# Patient Record
Sex: FEMALE | Race: WHITE | Employment: PART TIME | URBAN - METROPOLITAN AREA
[De-identification: names, ages, dates, MRNs, and addresses within clinical notes are randomized per-mention and may not be internally consistent; named-entity substitution may affect disease eponyms.]

---

## 2017-01-31 ENCOUNTER — OFFICE VISIT (OUTPATIENT)
Dept: FAMILY MEDICINE CLINIC | Age: 67
End: 2017-01-31

## 2017-01-31 VITALS
BODY MASS INDEX: 24.8 KG/M2 | DIASTOLIC BLOOD PRESSURE: 90 MMHG | WEIGHT: 140 LBS | SYSTOLIC BLOOD PRESSURE: 140 MMHG | HEART RATE: 87 BPM | TEMPERATURE: 97.5 F | RESPIRATION RATE: 16 BRPM | HEIGHT: 63 IN | OXYGEN SATURATION: 97 %

## 2017-01-31 DIAGNOSIS — L65.9 HAIR LOSS: ICD-10-CM

## 2017-01-31 DIAGNOSIS — I10 ESSENTIAL HYPERTENSION: Primary | ICD-10-CM

## 2017-01-31 DIAGNOSIS — M25.572 ACUTE LEFT ANKLE PAIN: ICD-10-CM

## 2017-01-31 RX ORDER — AMLODIPINE BESYLATE 10 MG/1
10 TABLET ORAL
Qty: 30 TAB | Refills: 5 | Status: SHIPPED | OUTPATIENT
Start: 2017-01-31 | End: 2017-01-31 | Stop reason: SDUPTHER

## 2017-01-31 RX ORDER — LANOLIN ALCOHOL/MO/W.PET/CERES
325 CREAM (GRAM) TOPICAL DAILY
Qty: 30 TAB | Refills: 5 | Status: SHIPPED | OUTPATIENT
Start: 2017-01-31 | End: 2017-01-31 | Stop reason: SDUPTHER

## 2017-01-31 RX ORDER — AMLODIPINE BESYLATE 10 MG/1
10 TABLET ORAL
Qty: 30 TAB | Refills: 5 | Status: SHIPPED | OUTPATIENT
Start: 2017-01-31 | End: 2017-04-07 | Stop reason: SDUPTHER

## 2017-01-31 RX ORDER — LANOLIN ALCOHOL/MO/W.PET/CERES
325 CREAM (GRAM) TOPICAL DAILY
Qty: 30 TAB | Refills: 5 | Status: SHIPPED | OUTPATIENT
Start: 2017-01-31 | End: 2017-08-26

## 2017-01-31 RX ORDER — LISINOPRIL 40 MG/1
40 TABLET ORAL DAILY
Qty: 30 TAB | Refills: 5 | Status: SHIPPED | OUTPATIENT
Start: 2017-01-31 | End: 2017-01-31 | Stop reason: SDUPTHER

## 2017-01-31 RX ORDER — ALBUTEROL SULFATE 90 UG/1
2 AEROSOL, METERED RESPIRATORY (INHALATION)
Qty: 1 INHALER | Refills: 5 | Status: SHIPPED | OUTPATIENT
Start: 2017-01-31 | End: 2017-08-14 | Stop reason: SDUPTHER

## 2017-01-31 RX ORDER — OXYCODONE AND ACETAMINOPHEN 5; 325 MG/1; MG/1
1 TABLET ORAL
Qty: 30 TAB | Refills: 0 | Status: SHIPPED | OUTPATIENT
Start: 2017-01-31 | End: 2017-04-07 | Stop reason: SDUPTHER

## 2017-01-31 RX ORDER — LISINOPRIL 40 MG/1
40 TABLET ORAL DAILY
Qty: 30 TAB | Refills: 5 | Status: SHIPPED | OUTPATIENT
Start: 2017-01-31 | End: 2017-04-07 | Stop reason: ALTCHOICE

## 2017-01-31 RX ORDER — ALBUTEROL SULFATE 90 UG/1
2 AEROSOL, METERED RESPIRATORY (INHALATION)
Qty: 1 INHALER | Refills: 5 | Status: SHIPPED | OUTPATIENT
Start: 2017-01-31 | End: 2017-01-31 | Stop reason: SDUPTHER

## 2017-01-31 NOTE — PROGRESS NOTES
Chief Complaint   Patient presents with    Hypertension     1. Have you been to the ER, urgent care clinic since your last visit? Hospitalized since your last visit? No    2. Have you seen or consulted any other health care providers outside of the [address] [place] [address] since your last visit? Include any pap smears or colon screening.  No

## 2017-01-31 NOTE — MR AVS SNAPSHOT
Visit Information Date & Time Provider Department Dept. Phone Encounter #  
 1/31/2017 10:20 AM Norman Vargas [phone] [id number] Follow-up Instructions Return in about 6 months (around 7/31/2017). Upcoming Health Maintenance Date Due Hepatitis C Screening 1950 FOOT EXAM Q1 12/15/1960 MICROALBUMIN Q1 12/15/1960 EYE EXAM RETINAL OR DILATED Q1 12/15/1960 DTaP/Tdap/Td series (1 - Tdap) 12/15/1971 BREAST CANCER SCRN MAMMOGRAM 12/15/2000 ZOSTER VACCINE AGE 60> 12/15/2010 GLAUCOMA SCREENING Q2Y 12/15/2015 OSTEOPOROSIS SCREENING (DEXA) 12/15/2015 Pneumococcal 65+ Low/Medium Risk (1 of 2 - PCV13) 12/15/2015 MEDICARE YEARLY EXAM 12/15/2015 INFLUENZA AGE 9 TO ADULT 8/1/2016 LIPID PANEL Q1 8/28/2016 HEMOGLOBIN A1C Q6M 4/23/2017 COLONOSCOPY 8/3/2025 Allergies as of 1/31/2017  Review Complete On: 1/31/2017 By: Jerome Payne LPN No Known Allergies Current Immunizations  Never Reviewed No immunizations on file. Not reviewed this visit You Were Diagnosed With   
  
 Codes Comments Essential hypertension    -  Primary ICD-10-CM: I10 
ICD-9-CM: 401.9 Acute left ankle pain     ICD-10-CM: M25.572 ICD-9-CM: 719.47 Vitals BP Pulse Temp Resp Height(growth percentile) Weight(growth percentile) 140/90 (BP 1 Location: Right arm, BP Patient Position: Sitting) 87 97.5 °F (36.4 °C) 16 5' 3\" (1.6 m) 140 lb (63.5 kg) SpO2 BMI OB Status Smoking Status 97% 24.8 kg/m2 Hysterectomy Former Smoker BMI and BSA Data Body Mass Index Body Surface Area  
 24.8 kg/m 2 1.68 m 2 Preferred Pharmacy Pharmacy Name Phone [address] [phone] Your Updated Medication List  
  
   
 This list is accurate as of: 1/31/17 11:03 AM.  Always use your most recent med list.  
  
  
  
  
 albuterol 90 mcg/actuation inhaler Commonly known as:  PROVENTIL HFA, VENTOLIN HFA, PROAIR HFA Take 2 Puffs by inhalation every four (4) hours as needed for Wheezing. amLODIPine 10 mg tablet Commonly known as:  Lindsey John Take 1 Tab by mouth nightly. Indications: Hypertension  
  
 ferrous sulfate 325 mg (65 mg iron) tablet Take 1 Tab by mouth daily. lisinopril 40 mg tablet Commonly known as:  Norma Salcha Take 1 Tab by mouth daily. oxyCODONE-acetaminophen 5-325 mg per tablet Commonly known as:  PERCOCET Take 1 Tab by mouth every eight (8) hours as needed. Max Daily Amount: 3 Tabs. Indications: PAIN Prescriptions Printed Refills  
 oxyCODONE-acetaminophen (PERCOCET) 5-325 mg per tablet 0 Sig: Take 1 Tab by mouth every eight (8) hours as needed. Max Daily Amount: 3 Tabs. Indications: PAIN Class: Print Route: Oral  
  
Prescriptions Sent to Pharmacy Refills  
 lisinopril (PRINIVIL, ZESTRIL) 40 mg tablet 5 Sig: Take 1 Tab by mouth daily. Class: Normal  
 Pharmacy: [facility] [address] [facility] [place] Ph #: [phone] Route: Oral  
 amLODIPine (NORVASC) 10 mg tablet 5 Sig: Take 1 Tab by mouth nightly. Indications: Hypertension Class: Normal  
 Pharmacy: [facility] [address] [facility] [place] Ph #: [phone] Route: Oral  
 albuterol (PROVENTIL HFA, VENTOLIN HFA, PROAIR HFA) 90 mcg/actuation inhaler 5 Sig: Take 2 Puffs by inhalation every four (4) hours as needed for Wheezing. Class: Normal  
 Pharmacy: [facility] [address] [facility] [place] Ph #: [phone] Route: Inhalation ferrous sulfate 325 mg (65 mg iron) tablet 5 Sig: Take 1 Tab by mouth daily. Class: Normal  
 Pharmacy: [facility] [address] [facility] [place]  #: [phone] Route: Oral  
  
Follow-up Instructions Return in about 6 months (around 7/31/2017). Introducing [facility]! Ora Telles introduces [employer] patient portal. Now you can access parts of your medical record, email your doctor's office, and request medication refills online. 1. In your internet browser, go to [email or web address]. [email or web address] 2. Click on the First Time User? Click Here link in the Sign In box. You will see the New Member Sign Up page. 3. Enter your Vionic Access Code exactly as it appears below. You will not need to use this code after youve completed the sign-up process. If you do not sign up before the expiration date, you must request a new code. · Vionic Access Code: [email or web address] Expires: 4/30/2017 12:49 PM 
 
4. Enter the last four digits of your Social Security Number (xxxx) and Date of Birth (mm/dd/yyyy) as indicated and click Submit. You will be taken to the next sign-up page. 5. Create a Vionic ID. This will be your Vionic login ID and cannot be changed, so think of one that is secure and easy to remember. 6. Create a Vionic password. You can change your password at any time. 7. Enter your Password Reset Question and Answer. This can be used at a later time if you forget your password. 8. Enter your e-mail address. You will receive e-mail notification when new information is available in [address] 9. Click Sign Up. You can now view and download portions of your medical record. 10. Click the Download Summary menu link to download a portable copy of your medical information.  
 
If you have questions, please visit the Frequently Asked Questions section of the [employer]. Remember, [employer] is NOT to be used for urgent needs. For medical emergencies, dial 911. Now available from your iPhone and Android! Please provide this summary of care documentation to your next provider. Your primary care clinician is listed as Fay Ashton. If you have any questions after today's visit, please call [phone].

## 2017-01-31 NOTE — PROGRESS NOTES
Marco Long  77 y.o. female  1950  [address] [facility] [address]  [address] [place] [address] Dr: Progress Note  Ondina Solis MD       Encounter Date: 1/31/2017    Chief Complaint   Patient presents with    Hypertension     140/90 today    Ankle Pain     X 7days, swollen     History of Present Illness   Marco Long is a 77 y.o. female who presents to clinic today for follow-up on blood pressure. Has been taking both medications without significant elevation. Denies dizziness, lightheadednss, chest pain, palpitations, headaches, changes in vision. Did restart work and worked 4 hours. Did okay. Has however had 7 days of left ankle pain and swelling. Worse with touching and movement. Has worn neoprene sleve without much benefit. No known trauma or injury. Review of Systems   Review of Systems   Constitutional: Negative for chills and fever. HENT: Negative for congestion and sore throat. Eyes: Negative for blurred vision and double vision. Respiratory: Negative for cough, shortness of breath and wheezing. Cardiovascular: Negative for chest pain and palpitations. Gastrointestinal: Negative for abdominal pain, constipation, diarrhea and nausea. Genitourinary: Negative for dysuria and hematuria. Musculoskeletal: Positive for joint pain. Negative for falls and myalgias. Skin: Negative for rash. Neurological: Negative for headaches. All other systems reviewed and are negative. Vitals/Objective:     Vitals:    01/31/17 1038   BP: 140/90   Pulse: 87   Resp: 16   Temp: 97.5 °F (36.4 °C)   SpO2: 97%   Weight: 140 lb (63.5 kg)   Height: 5' 3\" (1.6 m)     Body mass index is 24.8 kg/(m^2). Physical Exam   Constitutional: She appears well-nourished. No distress. Eyes: Conjunctivae and EOM are normal. No scleral icterus. Cardiovascular: Normal rate, regular rhythm, normal heart sounds and intact distal pulses.   Exam reveals no gallop and no friction rub. No murmur heard. Pulmonary/Chest: Effort normal and breath sounds normal. No respiratory distress. She has no wheezes. Abdominal: Bowel sounds are normal. She exhibits no distension. There is no tenderness. Musculoskeletal:        Left ankle: She exhibits swelling. She exhibits no deformity. Tenderness. Medial malleolus tenderness found. No lateral malleolus, no AITFL, no CF ligament, no posterior TFL, no head of 5th metatarsal and no proximal fibula tenderness found. Achilles tendon exhibits no pain, no defect and normal Barros's test results. Feet:    Neurological: She displays normal reflexes. Gait (Antalgic gait) abnormal. Coordination normal.     Assessment and Plan:   1. Essential hypertension  Continue current dose. Sent to new pharmacy given change in insurance. - lisinopril (PRINIVIL, ZESTRIL) 40 mg tablet; Take 1 Tab by mouth daily. Dispense: 30 Tab; Refill: 5  - amLODIPine (NORVASC) 10 mg tablet; Take 1 Tab by mouth nightly. Indications: Hypertension  Dispense: 30 Tab; Refill: 5    2. Acute left ankle pain  Ice, elevate, compression, NSAIDS. Possible sprain, though etiology unclear. If no improvement in 1 week, will x-ray. I have discussed the diagnosis with the patient and the intended plan as seen in the above orders. she has expressed understanding. The patient has received an after-visit summary and questions were answered concerning future plans. I have discussed medication side effects and warnings with the patient as well. Follow-up Disposition:  Return in about 6 months (around 7/31/2017). Electronically Signed: Terrance Gutierrez MD     History   Patients past medical, surgical and family histories were reviewed and updated.     Past Medical History   Diagnosis Date    Hard of hearing     Hypertension     Iron deficiency anemia      baseline 9.7 on 8/2015    PUD (peptic ulcer disease)      hospitalized at [facility], EGD showed a nonbleeding esophageal ulcer, multiple gastric ulcers    Pulmonary nodule 8/2/2015     CT chest: 2.8 cm focal airspace disease RUL, pna vs mass, referred to pulm with PET     Past Surgical History   Procedure Laterality Date    Hx partial hysterectomy Bilateral     Hx malignant skin lesion excision Right under  eye     Family History   Problem Relation Age of Onset    Diabetes Mother     Hypertension Mother     Alzheimer Mother     Pacemaker Mother     Heart Attack Father      Social History     Social History    Marital status:      Spouse name: N/A    Number of children: N/A    Years of education: N/A     Occupational History    Not on file. Social History Main Topics    Smoking status: Former Smoker     Packs/day: 0.25     Years: 30.00     Quit date: 10/16/2016    Smokeless tobacco: Never Used    Alcohol use 0.0 oz/week     0 Standard drinks or equivalent per week      Comment: very rare    Drug use: No    Sexual activity: Not on file     Other Topics Concern    Not on file     Social History Narrative            Current Medications/Allergies     Current Outpatient Prescriptions   Medication Sig Dispense Refill    lisinopril (PRINIVIL, ZESTRIL) 40 mg tablet Take 1 Tab by mouth daily. 30 Tab 5    amLODIPine (NORVASC) 10 mg tablet Take 1 Tab by mouth nightly. Indications: Hypertension 30 Tab 5    oxyCODONE-acetaminophen (PERCOCET) 5-325 mg per tablet Take 1 Tab by mouth every eight (8) hours as needed. Max Daily Amount: 3 Tabs. Indications: PAIN 30 Tab 0    albuterol (PROVENTIL HFA, VENTOLIN HFA, PROAIR HFA) 90 mcg/actuation inhaler Take 2 Puffs by inhalation every four (4) hours as needed for Wheezing. 1 Inhaler 5    ferrous sulfate 325 mg (65 mg iron) tablet Take 1 Tab by mouth daily.  30 Tab 5     No Known Allergies

## 2017-04-07 ENCOUNTER — OFFICE VISIT (OUTPATIENT)
Dept: FAMILY MEDICINE CLINIC | Age: 67
End: 2017-04-07

## 2017-04-07 VITALS
SYSTOLIC BLOOD PRESSURE: 248 MMHG | RESPIRATION RATE: 16 BRPM | HEART RATE: 91 BPM | TEMPERATURE: 99.2 F | BODY MASS INDEX: 25.87 KG/M2 | WEIGHT: 146 LBS | DIASTOLIC BLOOD PRESSURE: 130 MMHG | OXYGEN SATURATION: 95 % | HEIGHT: 63 IN

## 2017-04-07 DIAGNOSIS — M46.1 SACROILIITIS (HCC): ICD-10-CM

## 2017-04-07 DIAGNOSIS — I10 ACCELERATED HYPERTENSION: Primary | ICD-10-CM

## 2017-04-07 DIAGNOSIS — R10.33 PERIUMBILICAL ABDOMINAL PAIN: ICD-10-CM

## 2017-04-07 DIAGNOSIS — I10 ESSENTIAL HYPERTENSION: ICD-10-CM

## 2017-04-07 RX ORDER — AMLODIPINE BESYLATE 10 MG/1
10 TABLET ORAL
Qty: 30 TAB | Refills: 5 | Status: SHIPPED | OUTPATIENT
Start: 2017-04-07 | End: 2017-09-14 | Stop reason: SDUPTHER

## 2017-04-07 RX ORDER — LOSARTAN POTASSIUM 100 MG/1
100 TABLET ORAL DAILY
Qty: 30 TAB | Refills: 5 | Status: SHIPPED | OUTPATIENT
Start: 2017-04-07 | End: 2017-09-14 | Stop reason: SDUPTHER

## 2017-04-07 RX ORDER — OXYCODONE AND ACETAMINOPHEN 5; 325 MG/1; MG/1
1 TABLET ORAL
Qty: 30 TAB | Refills: 0 | Status: SHIPPED | OUTPATIENT
Start: 2017-04-07 | End: 2017-04-14 | Stop reason: ALTCHOICE

## 2017-04-07 NOTE — PATIENT INSTRUCTIONS
Sacroiliac Joint Pain: Care Instructions  Your Care Instructions    The sacroiliac joints connect the spine and each side of the pelvis. These joints bear the weight and stress of your torso. This makes them easy to injure. Injury or overuse of these joints may cause low back pain. Stress on these joints can cause joint pain. Sacroiliac joint pain is more common in pregnant women. Certain kinds of arthritis also may cause this type of joint pain. Home treatment may help you feel better. So can avoiding activities that stress your back. Your doctor also may recommend physical therapy. This may include doing exercises and stretches to help with pain. You may also learn to use good posture. Follow-up care is a key part of your treatment and safety. Be sure to make and go to all appointments, and call your doctor if you are having problems. It's also a good idea to know your test results and keep a list of the medicines you take. How can you care for yourself at home? · Ask your doctor about light exercises that may help your back pain. Try to do light activity throughout the day. But make sure to take rests as needed. Find a comfortable position for rest, but don't stay in one position for too long. Avoid activities that cause pain. · To apply heat, put a warm water bottle, a heating pad set on low, or a warm cloth on your back. Do not go to sleep with a heating pad on your skin. · Put ice or a cold pack on your back for 10 to 20 minutes at a time. Put a thin cloth between the ice and your skin. · If the doctor gave you a prescription medicine for pain, take it as prescribed. · If you are not taking a prescription pain medicine, ask your doctor if you can take an over-the-counter pain medicine, such as acetaminophen (Tylenol), ibuprofen (Advil, Motrin), or naproxen (Aleve). Read and follow all instructions on the label. Take pain medicines exactly as directed.   · Do not take two or more pain medicines at the same time unless the doctor told you to. Many pain medicines have acetaminophen, which is Tylenol. Too much acetaminophen (Tylenol) can be harmful. · To prevent future back pain, do exercises to stretch and strengthen your back and stomach. Learn how to use good posture, safe lifting techniques, and proper body mechanics. When should you call for help? Call 911 anytime you think you may need emergency care. For example, call if:  · You are unable to move a leg at all. Call your doctor now or seek immediate medical care if:  · You have new or worse symptoms in your legs or buttocks. Symptoms may include:  ¨ Numbness or tingling. ¨ Weakness. ¨ Pain. · You lose bladder or bowel control. Watch closely for changes in your health, and be sure to contact your doctor if:  · You are not getting better as expected. Where can you learn more? Go to http://katerinPearlChain.netcelina.info/. Enter O658 in the search box to learn more about \"Sacroiliac Joint Pain: Care Instructions. \"  Current as of: May 23, 2016  Content Version: 11.2  © 6050-0824 [employer]. Care instructions adapted under license by [employer] (which disclaims liability or warranty for this information). If you have questions about a medical condition or this instruction, always ask your healthcare professional. [address] any warranty or liability for your use of this information. Sacroiliac Pain: Exercises  Your Care Instructions  Here are some examples of typical rehabilitation exercises for your condition. Start each exercise slowly. Ease off the exercise if you start to have pain. Your doctor or physical therapist will tell you when you can start these exercises and which ones will work best for you. How to do the exercises  Knee-to-chest stretch    Do not do the knee-to-chest exercise if it causes or increases back or leg pain.   1. Lie on your back with your knees bent and your feet flat on the floor. You can put a small pillow under your head and neck if it is more comfortable. 2. Grasp your hands under one knee and bring the knee to your chest, keeping the other foot flat on the floor. 3. Keep your lower back pressed to the floor. Hold for at least 15 to 30 seconds. 4. Relax and lower the knee to the starting position. Repeat with the other leg. 5. Repeat 2 to 4 times with each leg. 6. To get more stretch, keep your other leg flat on the floor while pulling your knee to your chest.  Bridging    1. Lie on your back with both knees bent. Your knees should be bent about 90 degrees. 2. Tighten your belly muscles by pulling in your belly button toward your spine. Then push your feet into the floor, squeeze your buttocks, and lift your hips off the floor until your shoulders, hips, and knees are all in a straight line. 3. Hold for about 6 seconds as you continue to breathe normally, and then slowly lower your hips back down to the floor and rest for up to 10 seconds. 4. Repeat 8 to 12 times. Hip extension    1. Get down on your hands and knees on the floor. 2. Keeping your back and neck straight, lift one leg straight out behind you. When you lift your leg, keep your hips level. Don't let your back twist, and don't let your hip drop toward the floor. 3. Hold for 6 seconds. Repeat 8 to 12 times with each leg. 4. If you feel steady and strong when you do this exercise, you can make it more difficult. To do this, when you lift your leg, also lift the opposite arm straight out in front of you. For example, lift the left leg and the right arm at the same time. (This is sometimes called the \"bird dog exercise. \") Hold for 6 seconds, and repeat 8 to 12 times on each side. Clamshell    1. Lie on your side with a pillow under your head. Keep your feet and knees together and your knees bent. 2. Raise your top knee, but keep your feet together. Do not let your hips roll back.  Your legs should open up like a clamshell. 3. Hold for 6 seconds. 4. Slowly lower your knee back down. Rest for 10 seconds. 5. Repeat 8 to 12 times. 6. Switch to your other side and repeat steps 1 through 5. Hamstring wall stretch    1. Lie on your back in a doorway, with one leg through the open door. 2. Slide your affected leg up the wall to straighten your knee. You should feel a gentle stretch down the back of your leg. ¨ Do not arch your back. ¨ Do not bend either knee. ¨ Keep one heel touching the floor and the other heel touching the wall. Do not point your toes. 3. Hold the stretch for at least 1 minute to begin. Then try to lengthen the time you hold the stretch to as long as 6 minutes. 4. Switch legs, and repeat steps 1 through 3.  5. Repeat 2 to 4 times. If you do not have a place to do this exercise in a doorway, there is another way to do it:  1. Lie on your back, and bend one knee. 2. Loop a towel under the ball and toes of that foot, and hold the ends of the towel in your hands. 3. Straighten your knee, and slowly pull back on the towel. You should feel a gentle stretch down the back of your leg. 4. Switch legs, and repeat steps 1 through 3.  5. Repeat 2 to 4 times. Lower abdominal strengthening    1. Lie on your back with your knees bent and your feet flat on the floor. 2. Tighten your belly muscles by pulling your belly button in toward your spine. 3. Lift one foot off the floor and bring your knee toward your chest, so that your knee is straight above your hip and your leg is bent like the letter \"L. \"  4. Lift the other knee up to the same position. 5. Lower one leg at a time to the starting position. 6. Keep alternating legs until you have lifted each leg 8 to 12 times. 7. Be sure to keep your belly muscles tight and your back still as you are moving your legs. Be sure to breathe normally. Piriformis stretch    1. Lie on your back with your legs straight.   2. Lift your affected leg, and bend your knee. With your opposite hand, reach across your body, and then gently pull your knee toward your opposite shoulder. 3. Hold the stretch for 15 to 30 seconds. 4. Switch legs and repeat steps 1 through 3.  5. Repeat 2 to 4 times. Follow-up care is a key part of your treatment and safety. Be sure to make and go to all appointments, and call your doctor if you are having problems. It's also a good idea to know your test results and keep a list of the medicines you take. Where can you learn more? Go to http://katerin-celina.info/. Enter C592 in the search box to learn more about \"Sacroiliac Pain: Exercises. \"  Current as of: May 23, 2016  Content Version: 11.2  © 3304-8862 [employer]. Care instructions adapted under license by [employer] (which disclaims liability or warranty for this information). If you have questions about a medical condition or this instruction, always ask your healthcare professional. [place] [address] any warranty or liability for your use of this information. Acute High Blood Pressure: Care Instructions  Your Care Instructions  Acute high blood pressure is very high blood pressure. It's a serious problem. Very high blood pressure can damage your brain, heart, eyes, and kidneys. You may have been given medicines to lower your blood pressure. You may have gotten them as pills or through a needle in one of your veins. This is called an IV. And maybe you were given other medicines too. These can be needed when high blood pressure causes other problems. To keep your blood pressure at a lower level, you may need to make healthy lifestyle changes. And you will probably need to take medicines. Be sure to follow up with your doctor about your blood pressure and what you can do about it. Follow-up care is a key part of your treatment and safety.  Be sure to make and go to all appointments, and call your doctor if you are having problems. It's also a good idea to know your test results and keep a list of the medicines you take. How can you care for yourself at home? · See your doctor as often as he or she recommends. This is to make sure your blood pressure is under control. You will probably go at least 2 times a year. But it may be more often at first.  · Take your blood pressure medicine exactly as prescribed. You may take one or more types. They include diuretics, beta-blockers, ACE inhibitors, calcium channel blockers, and angiotensin II receptor blockers. Call your doctor if you think you are having a problem with your medicine. · If you take blood pressure medicine, talk to your doctor before you take decongestants or anti-inflammatory medicine, such as ibuprofen. These can raise blood pressure. · Learn how to check your blood pressure at home. Check it often. · Ask your doctor if it's okay to drink alcohol. · Talk to your doctor about lifestyle changes that can help blood pressure. These include being active and not smoking. When should you call for help? Call 911 anytime you think you may need emergency care. This may mean having symptoms that suggest that your blood pressure is causing a serious heart or blood vessel problem. Your blood pressure may be over 180/110. For example, call 911 if:  · You have symptoms of a heart attack. These may include:  ¨ Chest pain or pressure, or a strange feeling in the chest.  ¨ Sweating. ¨ Shortness of breath. ¨ Nausea or vomiting. ¨ Pain, pressure, or a strange feeling in the back, neck, jaw, or upper belly or in one or both shoulders or arms. ¨ Lightheadedness or sudden weakness. ¨ A fast or irregular heartbeat. · You have symptoms of a stroke. These may include:  ¨ Sudden numbness, tingling, weakness, or loss of movement in your face, arm, or leg, especially on only one side of your body. ¨ Sudden vision changes. ¨ Sudden trouble speaking.   ¨ Sudden confusion or trouble understanding simple statements. ¨ Sudden problems with walking or balance. ¨ A sudden, severe headache that is different from past headaches. · You have severe back or belly pain. Do not wait until your blood pressure comes down on its own. Get help right away. Call your doctor now or seek immediate care if:  · Your blood pressure is much higher than normal (such as 180/110 or higher), but you don't have symptoms. · You think high blood pressure is causing symptoms, such as:  ¨ Severe headache. ¨ Blurry vision. Watch closely for changes in your health, and be sure to contact your doctor if:  · Your blood pressure measures 140/90 or higher at least 2 times. That means the top number is 140 or higher or the bottom number is 90 or higher, or both. · You think you may be having side effects from your blood pressure medicine. · Your blood pressure is usually normal, but it goes above normal at least 2 times. Where can you learn more? Go to http://katerin-celina.info/. Enter A194 in the search box to learn more about \"Acute High Blood Pressure: Care Instructions. \"  Current as of: August 8, 2016  Content Version: 11.2  © 2595-9077 [employer]. Care instructions adapted under license by [employer] (which disclaims liability or warranty for this information). If you have questions about a medical condition or this instruction, always ask your healthcare professional. [place] [address] any warranty or liability for your use of this information. Abdominal Pain: Care Instructions  Your Care Instructions    Abdominal pain has many possible causes. Some aren't serious and get better on their own in a few days. Others need more testing and treatment. If your pain continues or gets worse, you need to be rechecked and may need more tests to find out what is wrong. You may need surgery to correct the problem.   Don't ignore new symptoms, such as fever, nausea and vomiting, urination problems, pain that gets worse, and dizziness. These may be signs of a more serious problem. Your doctor may have recommended a follow-up visit in the next 8 to 12 hours. If you are not getting better, you may need more tests or treatment. The doctor has checked you carefully, but problems can develop later. If you notice any problems or new symptoms, get medical treatment right away. Follow-up care is a key part of your treatment and safety. Be sure to make and go to all appointments, and call your doctor if you are having problems. It's also a good idea to know your test results and keep a list of the medicines you take. How can you care for yourself at home? · Rest until you feel better. · To prevent dehydration, drink plenty of fluids, enough so that your urine is light yellow or clear like water. Choose water and other caffeine-free clear liquids until you feel better. If you have kidney, heart, or liver disease and have to limit fluids, talk with your doctor before you increase the amount of fluids you drink. · If your stomach is upset, eat mild foods, such as rice, dry toast or crackers, bananas, and applesauce. Try eating several small meals instead of two or three large ones. · Wait until 48 hours after all symptoms have gone away before you have spicy foods, alcohol, and drinks that contain caffeine. · Do not eat foods that are high in fat. · Avoid anti-inflammatory medicines such as aspirin, ibuprofen (Advil, Motrin), and naproxen (Aleve). These can cause stomach upset. Talk to your doctor if you take daily aspirin for another health problem. When should you call for help? Call 911 anytime you think you may need emergency care. For example, call if:  · You passed out (lost consciousness). · You pass maroon or very bloody stools. · You vomit blood or what looks like coffee grounds. · You have new, severe belly pain.   Call your doctor now or seek immediate medical care if:  · Your pain gets worse, especially if it becomes focused in one area of your belly. · You have a new or higher fever. · Your stools are black and look like tar, or they have streaks of blood. · You have unexpected vaginal bleeding. · You have symptoms of a urinary tract infection. These may include:  ¨ Pain when you urinate. ¨ Urinating more often than usual.  ¨ Blood in your urine. · You are dizzy or lightheaded, or you feel like you may faint. Watch closely for changes in your health, and be sure to contact your doctor if:  · You are not getting better after 1 day (24 hours). Where can you learn more? Go to http://katerin-celina.info/. Enter X917 in the search box to learn more about \"Abdominal Pain: Care Instructions. \"  Current as of: May 27, 2016  Content Version: 11.2  © 6737-9281 [employer]. Care instructions adapted under license by [employer] (which disclaims liability or warranty for this information). If you have questions about a medical condition or this instruction, always ask your healthcare professional. [place] [address] any warranty or liability for your use of this information.

## 2017-04-07 NOTE — PROGRESS NOTES
Anurag Amaya  77 y.o. female  1950  [address] [facility] [id number]  [address] [place] [address] Dr: Progress Note  Olena Lamar MD       Encounter Date: 4/7/2017    Chief Complaint   Patient presents with    Leg Pain     pain in right leg when walking. History of Present Illness   Anurag Amaya is a 77 y.o. female who presents to clinic today for several complaints:    Leg pain: Radiating from her buttocks down her leg when she walks. Denies injury to leg or back. Pain rated a 9/10. Has tried OTC medication for pain. Abdominal Pain: Periumbilical.  Not associated with nausea/vomiting. Denies diarrhea. Occasional constipation. HTN: Has been out of her amlodipine and losartan for approximately 1 week. Review of Systems   Review of Systems   Respiratory: Negative for shortness of breath and wheezing. Cardiovascular: Negative for chest pain and palpitations. Gastrointestinal: Positive for abdominal pain. Genitourinary: Negative. Musculoskeletal: Positive for back pain. Neurological: Positive for sensory change and speech change (unchanged). Negative for dizziness, tingling, focal weakness, seizures and headaches. All other systems reviewed and are negative. Vitals/Objective:     Vitals:    04/07/17 1832 04/07/17 1913   BP: (!) 247/116 (!) 248/130   Pulse: 91    Resp: 16    Temp: 99.2 °F (37.3 °C)    TempSrc: Oral    SpO2: 95%    Weight: 146 lb (66.2 kg)    Height: 5' 3\" (1.6 m)      Body mass index is 25.86 kg/(m^2). Physical Exam   Constitutional: She is oriented to person, place, and time. She is cooperative. No distress. HENT:   Right Ear: Decreased hearing is noted. Left Ear: Decreased hearing is noted. Nose: Nose normal.   Mouth/Throat: Oropharynx is clear and moist and mucous membranes are normal. No oropharyngeal exudate. Eyes: Conjunctivae and EOM are normal. Pupils are equal, round, and reactive to light.  Right eye exhibits no discharge. Left eye exhibits no discharge. No scleral icterus. Neck: Neck supple. No thyromegaly present. Cardiovascular: Normal rate, regular rhythm, normal heart sounds and intact distal pulses. No extrasystoles are present. Exam reveals no gallop and no friction rub. No murmur heard. Pulmonary/Chest: Effort normal and breath sounds normal. No respiratory distress. She has no wheezes. She has no rhonchi. She exhibits no tenderness. Abdominal: Soft. She exhibits no distension. There is no rebound, no guarding and no CVA tenderness. Musculoskeletal: She exhibits no edema. Tenderness over SI joints bilaterally and reproducible pain with palpation of sciatic nerve. Negative straight leg raise. +NAINA   Lymphadenopathy:     She has no cervical adenopathy. Neurological: She is alert and oriented to person, place, and time. She has normal reflexes. Skin: No rash noted. Psychiatric: She has a normal mood and affect. Her speech is slurred. Vitals reviewed. Assessment and Plan:   1. Accelerated hypertension  Advised to go to the emergency room due to severely elevated blood pressure and risk for stroke. Daughter with the patient was adamant that she was not going to take her to the emergency department and that they would be fine getting her home medication and going home. I stressed my concerned about risk of stroke or MI and the urgent need to get better control of her blood pressure. Despite my urging, they have decided to go home. Reviewed s/sx of stroke and stressed that if these occur, she needs to go to the hospital ASAP. 2. Sacroiliitis (HCC)  Given BP, will avoid NSAIDS. Given small supply of percocet. Discussed used of heat/ice. Given HEP. Will likely need PT if no improvement. 3. Periumbilical abdominal pain  DDx: possible incisional or umbilical hernia. Also given BP, possible ischemic pain. Recommend U/S evaluation.  - US ABD COMP; Future    4. Essential hypertension  - losartan (COZAAR) 100 mg tablet; Take 1 Tab by mouth daily. Dispense: 30 Tab; Refill: 5  - amLODIPine (NORVASC) 10 mg tablet; Take 1 Tab by mouth nightly. Indications: hypertension  Dispense: 30 Tab; Refill: 5    I have discussed the diagnosis with the patient and the intended plan as seen in the above orders. she has expressed understanding. The patient has received an after-visit summary and questions were answered concerning future plans. I have discussed medication side effects and warnings with the patient as well. Follow-up Disposition:  Return Go to emergency room. Electronically Signed: Edith Valladares MD     History   Patients past medical, surgical and family histories were reviewed and updated. Past Medical History:   Diagnosis Date    Hard of hearing     Hypertension     Iron deficiency anemia     baseline 9.7 on 8/2015    PUD (peptic ulcer disease)     hospitalized at [facility], EGD showed a nonbleeding esophageal ulcer, multiple gastric ulcers    Pulmonary nodule 8/2/2015    CT chest: 2.8 cm focal airspace disease RUL, pna vs mass, referred to pulm with PET     Past Surgical History:   Procedure Laterality Date    HX MALIGNANT SKIN LESION EXCISION Right under  eye    HX PARTIAL HYSTERECTOMY Bilateral      Family History   Problem Relation Age of Onset    Diabetes Mother     Hypertension Mother     Alzheimer Mother     Pacemaker Mother     Heart Attack Father      Social History     Social History    Marital status:      Spouse name: N/A    Number of children: N/A    Years of education: N/A     Occupational History    Not on file.      Social History Main Topics    Smoking status: Former Smoker     Packs/day: 0.25     Years: 30.00     Quit date: 10/16/2016    Smokeless tobacco: Never Used    Alcohol use 0.0 oz/week     0 Standard drinks or equivalent per week      Comment: very rare    Drug use: No    Sexual activity: Not on file Other Topics Concern    Not on file     Social History Narrative            Current Medications/Allergies     Current Outpatient Prescriptions   Medication Sig Dispense Refill    amLODIPine (NORVASC) 10 mg tablet Take 1 Tab by mouth nightly. Indications: Hypertension 30 Tab 5    albuterol (PROVENTIL HFA, VENTOLIN HFA, PROAIR HFA) 90 mcg/actuation inhaler Take 2 Puffs by inhalation every four (4) hours as needed for Wheezing. 1 Inhaler 5    ferrous sulfate 325 mg (65 mg iron) tablet Take 1 Tab by mouth daily. 30 Tab 5    oxyCODONE-acetaminophen (PERCOCET) 5-325 mg per tablet Take 1 Tab by mouth every eight (8) hours as needed. Max Daily Amount: 3 Tabs. Indications: PAIN 30 Tab 0    lisinopril (PRINIVIL, ZESTRIL) 40 mg tablet Take 1 Tab by mouth daily.  30 Tab 5     No Known Allergies

## 2017-04-07 NOTE — MR AVS SNAPSHOT
Visit Information Date & Time Provider Department Dept. Phone Encounter #  
 4/7/2017  5:45 PM Crystal Emerson, Norman Bhatti [phone] [id number] Follow-up Instructions Return Go to emergency room. Upcoming Health Maintenance Date Due Hepatitis C Screening 1950 FOOT EXAM Q1 12/15/1960 MICROALBUMIN Q1 12/15/1960 EYE EXAM RETINAL OR DILATED Q1 12/15/1960 DTaP/Tdap/Td series (1 - Tdap) 12/15/1971 BREAST CANCER SCRN MAMMOGRAM 12/15/2000 ZOSTER VACCINE AGE 60> 12/15/2010 GLAUCOMA SCREENING Q2Y 12/15/2015 OSTEOPOROSIS SCREENING (DEXA) 12/15/2015 Pneumococcal 65+ Low/Medium Risk (1 of 2 - PCV13) 12/15/2015 MEDICARE YEARLY EXAM 12/15/2015 INFLUENZA AGE 9 TO ADULT 8/1/2016 LIPID PANEL Q1 8/28/2016 HEMOGLOBIN A1C Q6M 4/23/2017 COLONOSCOPY 8/3/2025 Allergies as of 4/7/2017  Review Complete On: 1/31/2017 By: Maria De Jesus Sheehan LPN No Known Allergies Current Immunizations  Never Reviewed No immunizations on file. Not reviewed this visit You Were Diagnosed With   
  
 Codes Comments Accelerated hypertension    -  Primary ICD-10-CM: I10 
ICD-9-CM: 401.0 Sacroiliitis ([facility] [address]     ICD-10-CM: M46.1 ICD-9-CM: 720.2 Periumbilical abdominal pain     ICD-10-CM: R10.33 ICD-9-CM: 789.05 Essential hypertension     ICD-10-CM: I10 
ICD-9-CM: 401.9 Vitals BP Pulse Temp Resp Height(growth percentile) Weight(growth percentile) (!) 247/116 (BP 1 Location: Left arm, BP Patient Position: Sitting) 91 99.2 °F (37.3 °C) (Oral) 16 5' 3\" (1.6 m) 146 lb (66.2 kg) SpO2 BMI OB Status Smoking Status 95% 25.86 kg/m2 Hysterectomy Former Smoker Vitals History BMI and BSA Data Body Mass Index Body Surface Area  
 25.86 kg/m 2 1.72 m 2 Preferred Pharmacy Pharmacy Name Phone [address] Your Updated Medication List  
  
   
This list is accurate as of: 4/7/17  7:11 PM.  Always use your most recent med list.  
  
  
  
  
 albuterol 90 mcg/actuation inhaler Commonly known as:  PROVENTIL HFA, VENTOLIN HFA, PROAIR HFA Take 2 Puffs by inhalation every four (4) hours as needed for Wheezing. amLODIPine 10 mg tablet Commonly known as:  Barabara Puls Take 1 Tab by mouth nightly. Indications: hypertension  
  
 ferrous sulfate 325 mg (65 mg iron) tablet Take 1 Tab by mouth daily. losartan 100 mg tablet Commonly known as:  COZAAR Take 1 Tab by mouth daily. oxyCODONE-acetaminophen 5-325 mg per tablet Commonly known as:  PERCOCET Take 1 Tab by mouth every eight (8) hours as needed. Max Daily Amount: 3 Tabs. Indications: Pain Prescriptions Printed Refills  
 oxyCODONE-acetaminophen (PERCOCET) 5-325 mg per tablet 0 Sig: Take 1 Tab by mouth every eight (8) hours as needed. Max Daily Amount: 3 Tabs. Indications: Pain Class: Print Route: Oral  
  
Prescriptions Sent to Pharmacy Refills  
 losartan (COZAAR) 100 mg tablet 5 Sig: Take 1 Tab by mouth daily. Class: Normal  
 Pharmacy: [address] Ph #: [phone] Route: Oral  
 amLODIPine (NORVASC) 10 mg tablet 5 Sig: Take 1 Tab by mouth nightly. Indications: hypertension Class: Normal  
 Pharmacy: [address] Ph #: [phone] Route: Oral  
  
Follow-up Instructions Return Go to emergency room. To-Do List   
 Around 04/10/2017 Imaging:  US ABD COMP Patient Instructions Sacroiliac Joint Pain: Care Instructions Your Care Instructions The sacroiliac joints connect the spine and each side of the pelvis. These joints bear the weight and stress of your torso.  This makes them easy to injure. Injury or overuse of these joints may cause low back pain. Stress on these joints can cause joint pain. Sacroiliac joint pain is more common in pregnant women. Certain kinds of arthritis also may cause this type of joint pain. Home treatment may help you feel better. So can avoiding activities that stress your back. Your doctor also may recommend physical therapy. This may include doing exercises and stretches to help with pain. You may also learn to use good posture. Follow-up care is a key part of your treatment and safety. Be sure to make and go to all appointments, and call your doctor if you are having problems. It's also a good idea to know your test results and keep a list of the medicines you take. How can you care for yourself at home? · Ask your doctor about light exercises that may help your back pain. Try to do light activity throughout the day. But make sure to take rests as needed. Find a comfortable position for rest, but don't stay in one position for too long. Avoid activities that cause pain. · To apply heat, put a warm water bottle, a heating pad set on low, or a warm cloth on your back. Do not go to sleep with a heating pad on your skin. · Put ice or a cold pack on your back for 10 to 20 minutes at a time. Put a thin cloth between the ice and your skin. · If the doctor gave you a prescription medicine for pain, take it as prescribed. · If you are not taking a prescription pain medicine, ask your doctor if you can take an over-the-counter pain medicine, such as acetaminophen (Tylenol), ibuprofen (Advil, Motrin), or naproxen (Aleve). Read and follow all instructions on the label. Take pain medicines exactly as directed. · Do not take two or more pain medicines at the same time unless the doctor told you to. Many pain medicines have acetaminophen, which is Tylenol. Too much acetaminophen (Tylenol) can be harmful. · To prevent future back pain, do exercises to stretch and strengthen your back and stomach. Learn how to use good posture, safe lifting techniques, and proper body mechanics. When should you call for help? Call 911 anytime you think you may need emergency care. For example, call if: 
· You are unable to move a leg at all. Call your doctor now or seek immediate medical care if: 
· You have new or worse symptoms in your legs or buttocks. Symptoms may include: ¨ Numbness or tingling. ¨ Weakness. ¨ Pain. · You lose bladder or bowel control. Watch closely for changes in your health, and be sure to contact your doctor if: 
· You are not getting better as expected. Where can you learn more? Go to http://katerin-celina.info/. Enter D229 in the search box to learn more about \"Sacroiliac Joint Pain: Care Instructions. \" Current as of: May 23, 2016 Content Version: 11.2 © 6879-5498 [employer]amer. Care instructions adapted under license by [employer] (which disclaims liability or warranty for this information). If you have questions about a medical condition or this instruction, always ask your healthcare professional. [place] [address] any warranty or liability for your use of this information. Sacroiliac Pain: Exercises Your Care Instructions Here are some examples of typical rehabilitation exercises for your condition. Start each exercise slowly. Ease off the exercise if you start to have pain. Your doctor or physical therapist will tell you when you can start these exercises and which ones will work best for you. How to do the exercises Knee-to-chest stretch Do not do the knee-to-chest exercise if it causes or increases back or leg pain. 1. Lie on your back with your knees bent and your feet flat on the floor. You can put a small pillow under your head and neck if it is more comfortable. 2. Grasp your hands under one knee and bring the knee to your chest, keeping the other foot flat on the floor. 3. Keep your lower back pressed to the floor. Hold for at least 15 to 30 seconds. 4. Relax and lower the knee to the starting position. Repeat with the other leg. 5. Repeat 2 to 4 times with each leg. 6. To get more stretch, keep your other leg flat on the floor while pulling your knee to your chest. 
Bridging 1. Lie on your back with both knees bent. Your knees should be bent about 90 degrees. 2. Tighten your belly muscles by pulling in your belly button toward your spine. Then push your feet into the floor, squeeze your buttocks, and lift your hips off the floor until your shoulders, hips, and knees are all in a straight line. 3. Hold for about 6 seconds as you continue to breathe normally, and then slowly lower your hips back down to the floor and rest for up to 10 seconds. 4. Repeat 8 to 12 times. Hip extension 1. Get down on your hands and knees on the floor. 2. Keeping your back and neck straight, lift one leg straight out behind you. When you lift your leg, keep your hips level. Don't let your back twist, and don't let your hip drop toward the floor. 3. Hold for 6 seconds. Repeat 8 to 12 times with each leg. 4. If you feel steady and strong when you do this exercise, you can make it more difficult. To do this, when you lift your leg, also lift the opposite arm straight out in front of you. For example, lift the left leg and the right arm at the same time. (This is sometimes called the \"bird dog exercise. \") Hold for 6 seconds, and repeat 8 to 12 times on each side. Clamshell 1. Lie on your side with a pillow under your head. Keep your feet and knees together and your knees bent. 2. Raise your top knee, but keep your feet together. Do not let your hips roll back. Your legs should open up like a clamshell. 3. Hold for 6 seconds. 4. Slowly lower your knee back down. Rest for 10 seconds. 5. Repeat 8 to 12 times. 6. Switch to your other side and repeat steps 1 through 5. Hamstring wall stretch 1. Lie on your back in a doorway, with one leg through the open door. 2. Slide your affected leg up the wall to straighten your knee. You should feel a gentle stretch down the back of your leg. ¨ Do not arch your back. ¨ Do not bend either knee. ¨ Keep one heel touching the floor and the other heel touching the wall. Do not point your toes. 3. Hold the stretch for at least 1 minute to begin. Then try to lengthen the time you hold the stretch to as long as 6 minutes. 4. Switch legs, and repeat steps 1 through 3. 
5. Repeat 2 to 4 times. If you do not have a place to do this exercise in a doorway, there is another way to do it: 1. Lie on your back, and bend one knee. 2. Loop a towel under the ball and toes of that foot, and hold the ends of the towel in your hands. 3. Straighten your knee, and slowly pull back on the towel. You should feel a gentle stretch down the back of your leg. 4. Switch legs, and repeat steps 1 through 3. 
5. Repeat 2 to 4 times. Lower abdominal strengthening 1. Lie on your back with your knees bent and your feet flat on the floor. 2. Tighten your belly muscles by pulling your belly button in toward your spine. 3. Lift one foot off the floor and bring your knee toward your chest, so that your knee is straight above your hip and your leg is bent like the letter \"L. \" 
4. Lift the other knee up to the same position. 5. Lower one leg at a time to the starting position. 6. Keep alternating legs until you have lifted each leg 8 to 12 times. 7. Be sure to keep your belly muscles tight and your back still as you are moving your legs. Be sure to breathe normally. Piriformis stretch 1. Lie on your back with your legs straight. 2. Lift your affected leg, and bend your knee.  With your opposite hand, reach across your body, and then gently pull your knee toward your opposite shoulder. 3. Hold the stretch for 15 to 30 seconds. 4. Switch legs and repeat steps 1 through 3. 
5. Repeat 2 to 4 times. Follow-up care is a key part of your treatment and safety. Be sure to make and go to all appointments, and call your doctor if you are having problems. It's also a good idea to know your test results and keep a list of the medicines you take. Where can you learn more? Go to http://katerin-celina.info/. Enter H184 in the search box to learn more about \"Sacroiliac Pain: Exercises. \" Current as of: May 23, 2016 Content Version: 11.2 © 2684-2712 Ceregene. Care instructions adapted under license by Cellfire (which disclaims liability or warranty for this information). If you have questions about a medical condition or this instruction, always ask your healthcare professional. [place] [address] any warranty or liability for your use of this information. Acute High Blood Pressure: Care Instructions Your Care Instructions Acute high blood pressure is very high blood pressure. It's a serious problem. Very high blood pressure can damage your brain, heart, eyes, and kidneys. You may have been given medicines to lower your blood pressure. You may have gotten them as pills or through a needle in one of your veins. This is called an IV. And maybe you were given other medicines too. These can be needed when high blood pressure causes other problems. To keep your blood pressure at a lower level, you may need to make healthy lifestyle changes. And you will probably need to take medicines. Be sure to follow up with your doctor about your blood pressure and what you can do about it. Follow-up care is a key part of your treatment and safety.  Be sure to make and go to all appointments, and call your doctor if you are having problems. It's also a good idea to know your test results and keep a list of the medicines you take. How can you care for yourself at home? · See your doctor as often as he or she recommends. This is to make sure your blood pressure is under control. You will probably go at least 2 times a year. But it may be more often at first. 
· Take your blood pressure medicine exactly as prescribed. You may take one or more types. They include diuretics, beta-blockers, ACE inhibitors, calcium channel blockers, and angiotensin II receptor blockers. Call your doctor if you think you are having a problem with your medicine. · If you take blood pressure medicine, talk to your doctor before you take decongestants or anti-inflammatory medicine, such as ibuprofen. These can raise blood pressure. · Learn how to check your blood pressure at home. Check it often. · Ask your doctor if it's okay to drink alcohol. · Talk to your doctor about lifestyle changes that can help blood pressure. These include being active and not smoking. When should you call for help? Call 911 anytime you think you may need emergency care. This may mean having symptoms that suggest that your blood pressure is causing a serious heart or blood vessel problem. Your blood pressure may be over 180/110. For example, call 911 if: 
· You have symptoms of a heart attack. These may include: ¨ Chest pain or pressure, or a strange feeling in the chest. 
¨ Sweating. ¨ Shortness of breath. ¨ Nausea or vomiting. ¨ Pain, pressure, or a strange feeling in the back, neck, jaw, or upper belly or in one or both shoulders or arms. ¨ Lightheadedness or sudden weakness. ¨ A fast or irregular heartbeat. · You have symptoms of a stroke. These may include: 
¨ Sudden numbness, tingling, weakness, or loss of movement in your face, arm, or leg, especially on only one side of your body. ¨ Sudden vision changes. ¨ Sudden trouble speaking. ¨ Sudden confusion or trouble understanding simple statements. ¨ Sudden problems with walking or balance. ¨ A sudden, severe headache that is different from past headaches. · You have severe back or belly pain. Do not wait until your blood pressure comes down on its own. Get help right away. Call your doctor now or seek immediate care if: 
· Your blood pressure is much higher than normal (such as 180/110 or higher), but you don't have symptoms. · You think high blood pressure is causing symptoms, such as: ¨ Severe headache. ¨ Blurry vision. Watch closely for changes in your health, and be sure to contact your doctor if: 
· Your blood pressure measures 140/90 or higher at least 2 times. That means the top number is 140 or higher or the bottom number is 90 or higher, or both. · You think you may be having side effects from your blood pressure medicine. · Your blood pressure is usually normal, but it goes above normal at least 2 times. Where can you learn more? Go to http://katerin-celina.info/. Enter F006 in the search box to learn more about \"Acute High Blood Pressure: Care Instructions. \" Current as of: August 8, 2016 Content Version: 11.2 © 4586-7219 [employer]. Care instructions adapted under license by Seir[employer]m (which disclaims liability or warranty for this information). If you have questions about a medical condition or this instruction, always ask your healthcare professional. [place] [address] any warranty or liability for your use of this information. Abdominal Pain: Care Instructions Your Care Instructions Abdominal pain has many possible causes. Some aren't serious and get better on their own in a few days. Others need more testing and treatment. If your pain continues or gets worse, you need to be rechecked and may need more tests to find out what is wrong. You may need surgery to correct the problem. Don't ignore new symptoms, such as fever, nausea and vomiting, urination problems, pain that gets worse, and dizziness. These may be signs of a more serious problem. Your doctor may have recommended a follow-up visit in the next 8 to 12 hours. If you are not getting better, you may need more tests or treatment. The doctor has checked you carefully, but problems can develop later. If you notice any problems or new symptoms, get medical treatment right away. Follow-up care is a key part of your treatment and safety. Be sure to make and go to all appointments, and call your doctor if you are having problems. It's also a good idea to know your test results and keep a list of the medicines you take. How can you care for yourself at home? · Rest until you feel better. · To prevent dehydration, drink plenty of fluids, enough so that your urine is light yellow or clear like water. Choose water and other caffeine-free clear liquids until you feel better. If you have kidney, heart, or liver disease and have to limit fluids, talk with your doctor before you increase the amount of fluids you drink. · If your stomach is upset, eat mild foods, such as rice, dry toast or crackers, bananas, and applesauce. Try eating several small meals instead of two or three large ones. · Wait until 48 hours after all symptoms have gone away before you have spicy foods, alcohol, and drinks that contain caffeine. · Do not eat foods that are high in fat. · Avoid anti-inflammatory medicines such as aspirin, ibuprofen (Advil, Motrin), and naproxen (Aleve). These can cause stomach upset. Talk to your doctor if you take daily aspirin for another health problem. When should you call for help? Call 911 anytime you think you may need emergency care. For example, call if: 
· You passed out (lost consciousness). · You pass maroon or very bloody stools. · You vomit blood or what looks like coffee grounds. · You have new, severe belly pain. Call your doctor now or seek immediate medical care if: 
· Your pain gets worse, especially if it becomes focused in one area of your belly. · You have a new or higher fever. · Your stools are black and look like tar, or they have streaks of blood. · You have unexpected vaginal bleeding. · You have symptoms of a urinary tract infection. These may include: 
¨ Pain when you urinate. ¨ Urinating more often than usual. 
¨ Blood in your urine. · You are dizzy or lightheaded, or you feel like you may faint. Watch closely for changes in your health, and be sure to contact your doctor if: 
· You are not getting better after 1 day (24 hours). Where can you learn more? Go to http://katerin-celina.info/. Enter Z768 in the search box to learn more about \"Abdominal Pain: Care Instructions. \" Current as of: May 27, 2016 Content Version: 11.2 © 0936-2885 Starbates. Care instructions adapted under license by Miew (which disclaims liability or warranty for this information). If you have questions about a medical condition or this instruction, always ask your healthcare professional. [place] [address] any warranty or liability for your use of this information. Introducing hospitals & HEALTH SERVICES! Jayson Garcia introduces [employer] patient portal. Now you can access parts of your medical record, email your doctor's office, and request medication refills online. 1. In your internet browser, go to [email or web address] 2. Click on the First Time User? Click Here link in the Sign In box. You will see the New Member Sign Up page. 3. Enter your [employer] Access Code exactly as it appears below. You will not need to use this code after youve completed the sign-up process. If you do not sign up before the expiration date, you must request a new code. · [employer] Access Code: [email or web address] Expires: 4/30/2017  1:49 PM 
 
 4. Enter the last four digits of your Social Security Number (xxxx) and Date of Birth (mm/dd/yyyy) as indicated and click Submit. You will be taken to the next sign-up page. 5. Create a [employer] ID. This will be your [employer] login ID and cannot be changed, so think of one that is secure and easy to remember. 6. Create a [employer] password. You can change your password at any time. 7. Enter your Password Reset Question and Answer. This can be used at a later time if you forget your password. 8. Enter your e-mail address. You will receive e-mail notification when new information is available in [address] 9. Click Sign Up. You can now view and download portions of your medical record. 10. Click the Download Summary menu link to download a portable copy of your medical information. If you have questions, please visit the Frequently Asked Questions section of the [employer] website. Remember, [employer] is NOT to be used for urgent needs. For medical emergencies, dial 911. Now available from your iPhone and Android! Please provide this summary of care documentation to your next provider. If you have any questions after today's visit, please call [phone].

## 2017-04-14 ENCOUNTER — OFFICE VISIT (OUTPATIENT)
Dept: FAMILY MEDICINE CLINIC | Age: 67
End: 2017-04-14

## 2017-04-14 ENCOUNTER — TELEPHONE (OUTPATIENT)
Dept: FAMILY MEDICINE CLINIC | Age: 67
End: 2017-04-14

## 2017-04-14 VITALS
HEART RATE: 88 BPM | WEIGHT: 143 LBS | OXYGEN SATURATION: 95 % | DIASTOLIC BLOOD PRESSURE: 92 MMHG | HEIGHT: 63 IN | RESPIRATION RATE: 16 BRPM | TEMPERATURE: 98.4 F | SYSTOLIC BLOOD PRESSURE: 173 MMHG | BODY MASS INDEX: 25.34 KG/M2

## 2017-04-14 DIAGNOSIS — G57.01 PIRIFORMIS SYNDROME OF RIGHT SIDE: Primary | ICD-10-CM

## 2017-04-14 RX ORDER — TIZANIDINE 4 MG/1
4 TABLET ORAL
Qty: 30 TAB | Refills: 1 | Status: SHIPPED | OUTPATIENT
Start: 2017-04-14 | End: 2017-08-26

## 2017-04-14 RX ORDER — TRAMADOL HYDROCHLORIDE 50 MG/1
50 TABLET ORAL
Qty: 20 TAB | Refills: 0 | Status: SHIPPED | OUTPATIENT
Start: 2017-04-14 | End: 2017-08-14 | Stop reason: ALTCHOICE

## 2017-04-14 NOTE — MR AVS SNAPSHOT
Visit Information Date & Time Provider Department Dept. Phone Encounter #  
 4/14/2017  3:45 PM Nyla Dubon DO [facility] [phone] [id number] Upcoming Health Maintenance Date Due Hepatitis C Screening 1950 FOOT EXAM Q1 12/15/1960 MICROALBUMIN Q1 12/15/1960 EYE EXAM RETINAL OR DILATED Q1 12/15/1960 DTaP/Tdap/Td series (1 - Tdap) 12/15/1971 BREAST CANCER SCRN MAMMOGRAM 12/15/2000 ZOSTER VACCINE AGE 60> 12/15/2010 GLAUCOMA SCREENING Q2Y 12/15/2015 OSTEOPOROSIS SCREENING (DEXA) 12/15/2015 Pneumococcal 65+ Low/Medium Risk (1 of 2 - PCV13) 12/15/2015 MEDICARE YEARLY EXAM 12/15/2015 INFLUENZA AGE 9 TO ADULT 8/1/2016 LIPID PANEL Q1 8/28/2016 HEMOGLOBIN A1C Q6M 4/23/2017 COLONOSCOPY 8/3/2025 Allergies as of 4/14/2017  Review Complete On: 4/14/2017 By: Nyla Dubon DO No Known Allergies Current Immunizations  Never Reviewed No immunizations on file. Not reviewed this visit You Were Diagnosed With   
  
 Codes Comments Piriformis syndrome of right side    -  Primary ICD-10-CM: G57.01 
ICD-9-CM: 355.0 Vitals BP Pulse Temp Resp Height(growth percentile) Weight(growth percentile) (!) 173/92 (BP 1 Location: Right arm, BP Patient Position: Sitting) 88 98.4 °F (36.9 °C) (Oral) 16 5' 3\" (1.6 m) 143 lb (64.9 kg) SpO2 BMI OB Status Smoking Status 95% 25.33 kg/m2 Hysterectomy Former Smoker Vitals History BMI and BSA Data Body Mass Index Body Surface Area  
 25.33 kg/m 2 1.7 m 2 Preferred Pharmacy Pharmacy Name Phone [address] Your Updated Medication List  
  
   
This list is accurate as of: 4/14/17  4:43 PM.  Always use your most recent med list.  
  
  
  
  
 albuterol 90 mcg/actuation inhaler Commonly known as:  PROVENTIL HFA, VENTOLIN HFA, PROAIR HFA  
 Take 2 Puffs by inhalation every four (4) hours as needed for Wheezing. amLODIPine 10 mg tablet Commonly known as:  Claudell Tahminadesiraebach Take 1 Tab by mouth nightly. Indications: hypertension  
  
 ferrous sulfate 325 mg (65 mg iron) tablet Take 1 Tab by mouth daily. losartan 100 mg tablet Commonly known as:  COZAAR Take 1 Tab by mouth daily. oxyCODONE-acetaminophen 5-325 mg per tablet Commonly known as:  PERCOCET Take 1 Tab by mouth every eight (8) hours as needed. Max Daily Amount: 3 Tabs. Indications: Pain  
  
 tiZANidine 4 mg tablet Commonly known as:  Jai Hides Take 1 Tab by mouth every six (6) hours as needed. For muscle spasm and pain Prescriptions Sent to Pharmacy Refills  
 tiZANidine (ZANAFLEX) 4 mg tablet 1 Sig: Take 1 Tab by mouth every six (6) hours as needed. For muscle spasm and pain  
 Class: Normal  
 Pharmacy: [address] #: [phone] Route: Oral  
  
Patient Instructions Piriformis Syndrome: Care Instructions Your Care Instructions The piriformis muscle is deep under your rear end (buttock). One end of the muscle connects deep inside the pelvic area, and the other end attaches to the top of the thighbone. This muscle can press on the sciatic nerve that runs from your spine down your leg. When this happens, you may have pain, numbness, and tingling in the buttock and down the back of your leg. This is called piriformis syndrome. The pain may get worse when you sit for a long time or climb stairs. Also, you may be more likely to develop piriformis syndrome if you run or walk often. Your doctor will check for other causes of your pain before treating this syndrome. Treatment may include stretching exercises, massage, and medicine for the pain and swelling. If these do not help, you may get a shot of steroid medicine.  Until the pain is gone, you may need to rest the muscle and limit activities like running. Exercises and a change in how you move and sit may be enough to stop the pressure on the nerve. Follow-up care is a key part of your treatment and safety. Be sure to make and go to all appointments, and call your doctor if you are having problems. It's also a good idea to know your test results and keep a list of the medicines you take. How can you care for yourself at home? · If your doctor thinks that strenuous exercise is causing your problem, stop or cut back on activities such as running. You may find swimming to be a good exercise for a while. · Stretch the piriformis muscle. Anjel Larkin on your back. ¨ Bend one leg at the knee and keep the other leg flat on the ground. ¨ Raise your bent knee up and then move it across your body. Hold the outside of the knee with the opposite hand. ¨ Gently pull the knee with your hand toward the opposite shoulder. ¨ Hold the stretch for at least 15 to 30 seconds. Switch legs. ¨ Do the stretch several times each day. · Massage the muscle to relieve pressure. ¨ Sit on the floor. Lean to one side so that the hip on your sore side is off the ground. Put a tennis ball under your buttock on that side. ¨ As you put weight onto the tennis ball, you may find spots that are especially sore. Move gently so that the tennis ball gently massages each of the sore spots. · Use ice or heat to help reduce pain. Put ice or a cold pack or a heating pad set on low or a warm cloth on the sore area for 10 to 20 minutes at a time. Put a thin cloth between the ice pack or heating pad and your skin. · Ask your doctor if you can take an over-the-counter pain medicine, such as acetaminophen (Tylenol), ibuprofen (Advil, Motrin), or naproxen (Aleve). Be safe with medicines. Read and follow all instructions on the label. · Have your doctor or a physical therapist watch how you move.  You may need physical therapy or special inserts in your shoes (orthotics) to help you move in a way that does not put pressure on your nerves. When should you call for help? Watch closely for changes in your health, and be sure to contact your doctor if: 
· You do not feel better after several weeks of home care. · Your pain gets worse. · Your leg becomes weak or numb. Where can you learn more? Go to http://katerin-celina.info/. Enter U241 in the search box to learn more about \"Piriformis Syndrome: Care Instructions. \" Current as of: May 23, 2016 Content Version: 11.2 © 0714-8010 [employer]. Care instructions adapted under license by [employer] (which disclaims liability or warranty for this information). If you have questions about a medical condition or this instruction, always ask your healthcare professional. [place] [address] any warranty or liability for your use of this information. Piriformis Syndrome: Exercises Your Care Instructions Here are some examples of typical rehabilitation exercises for your condition. Start each exercise slowly. Ease off the exercise if you start to have pain. Your doctor or physical therapist will tell you when you can start these exercises and which ones will work best for you. How to do the exercises Hip rotator stretch 1. Lie on your back with both knees bent and your feet flat on the floor. 2. Put the ankle of your affected leg on your opposite thigh near your knee. 3. Use your hand to gently push your knee (on your affected leg) away from your body until you feel a gentle stretch around your hip. 4. Hold the stretch for 15 to 30 seconds. 5. Repeat 2 to 4 times. 6. Switch legs and repeat steps 1 through 5. Piriformis stretch 1. Lie on your back with your legs straight. 2. Lift your affected leg and bend your knee.  With your opposite hand, reach across your body, and then gently pull your knee toward your opposite shoulder. 3. Hold the stretch for 15 to 30 seconds. 4. Repeat with your other leg. 5. Repeat 2 to 4 times on each side. Lower abdominal strengthening 1. Lie on your back with your knees bent and your feet flat on the floor. 2. Tighten your belly muscles by pulling your belly button in toward your spine. 3. Lift one foot off the floor and bring your knee toward your chest, so that your knee is straight above your hip and your leg is bent like the letter \"L. \" 
4. Lift the other knee up to the same position. 5. Lower one leg at a time to the starting position. 6. Keep alternating legs until you have lifted each leg 8 to 12 times. 7. Be sure to keep your belly muscles tight and your back still as you are moving your legs. Be sure to breathe normally. Follow-up care is a key part of your treatment and safety. Be sure to make and go to all appointments, and call your doctor if you are having problems. It's also a good idea to know your test results and keep a list of the medicines you take. Where can you learn more? Go to http://katerin-celina.info/. Enter N074 in the search box to learn more about \"Piriformis Syndrome: Exercises. \" Current as of: May 23, 2016 Content Version: 11.2 © 4717-0400 [employer]. Care instructions adapted under license by [employer] (which disclaims liability or warranty for this information). If you have questions about a medical condition or this instruction, always ask your healthcare professional. [place] [address] any warranty or liability for your use of this information. Introducing [facility]! Tanis Epley introduces [employer] patient portal. Now you can access parts of your medical record, email your doctor's office, and request medication refills online.    
 
1. In your internet browser, go to [email or web address] [email or web address] 2. Click on the First Time User? Click Here link in the Sign In box. You will see the New Member Sign Up page. 3. Enter your Power[employer] Access Code exactly as it appears below. You will not need to use this code after youve completed the sign-up process. If you do not sign up before the expiration date, you must request a new code. · [employer] Access Code: [email or web address] Expires: 4/30/2017  1:49 PM 
 
4. Enter the last four digits of your Social Security Number (xxxx) and Date of Birth (mm/dd/yyyy) as indicated and click Submit. You will be taken to the next sign-up page. 5. Create a [employer] ID. This will be your Power[employer] login ID and cannot be changed, so think of one that is secure and easy to remember. 6. Create a [employer] password. You can change your password at any time. 7. Enter your Password Reset Question and Answer. This can be used at a later time if you forget your password. 8. Enter your e-mail address. You will receive e-mail notification when new information is available in [address] 9. Click Sign Up. You can now view and download portions of your medical record. 10. Click the Download Summary menu link to download a portable copy of your medical information. If you have questions, please visit the Frequently Asked Questions section of the [employer] website. Remember, [employer] is NOT to be used for urgent needs. For medical emergencies, dial 911. Now available from your iPhone and Android! Please provide this summary of care documentation to your next provider. If you have any questions after today's visit, please call [phone].

## 2017-04-14 NOTE — PROGRESS NOTES
Chief Complaint   Patient presents with    Buttocks pain     1. Have you been to the ER, urgent care clinic since your last visit? Hospitalized since your last visit? no    2. Have you seen or consulted any other health care providers outside of the [address] [place] [address] since your last visit? Include any pap smears or colon screening.  no

## 2017-04-14 NOTE — PROGRESS NOTES
Subjective  Yamil Nobles is an 77 y.o. female who presents for follow up of hip/buttock pain. SI joint pain: seen here last week, has been taking Percocet for pain, no improvement. Ran out of Percocet this morning. Pain first started about a week and a half ago. No recent falls or trips or trauma. She's not able to work due to the pain. Trouble walking and getting around. Worse when she stands up. Pain is a catching cramping pain. Shoots from butt down back of leg. No numbness. HTN: BP elevated today. Daughter states BP is always high, higher at last visit. Patient and daughter relates it being up because of the pain she is in. No cp, palpitations or sob. Allergies - reviewed:   No Known Allergies      Medications - reviewed:   Current Outpatient Prescriptions   Medication Sig    tiZANidine (ZANAFLEX) 4 mg tablet Take 1 Tab by mouth every six (6) hours as needed. For muscle spasm and pain    traMADol (ULTRAM) 50 mg tablet Take 1 Tab by mouth every six (6) hours as needed for Pain. Max Daily Amount: 200 mg.    losartan (COZAAR) 100 mg tablet Take 1 Tab by mouth daily.  amLODIPine (NORVASC) 10 mg tablet Take 1 Tab by mouth nightly. Indications: hypertension    albuterol (PROVENTIL HFA, VENTOLIN HFA, PROAIR HFA) 90 mcg/actuation inhaler Take 2 Puffs by inhalation every four (4) hours as needed for Wheezing.  ferrous sulfate 325 mg (65 mg iron) tablet Take 1 Tab by mouth daily. No current facility-administered medications for this visit.           Past Medical History - reviewed:  Past Medical History:   Diagnosis Date    Hard of hearing     Hypertension     Iron deficiency anemia     baseline 9.7 on 8/2015    PUD (peptic ulcer disease)     hospitalized at [facility], EGD showed a nonbleeding esophageal ulcer, multiple gastric ulcers    Pulmonary nodule 8/2/2015    CT chest: 2.8 cm focal airspace disease RUL, pna vs mass, referred to pulm with PET         Past Surgical History - reviewed:   Past Surgical History:   Procedure Laterality Date    HX MALIGNANT SKIN LESION EXCISION Right under  eye    HX PARTIAL HYSTERECTOMY Bilateral          Social History - reviewed:  Social History     Social History    Marital status:      Spouse name: N/A    Number of children: N/A    Years of education: N/A     Occupational History    Not on file. Social History Main Topics    Smoking status: Former Smoker     Packs/day: 0.25     Years: 30.00     Quit date: 10/16/2016    Smokeless tobacco: Never Used    Alcohol use 0.0 oz/week     0 Standard drinks or equivalent per week      Comment: very rare    Drug use: No    Sexual activity: Not on file     Other Topics Concern    Not on file     Social History Narrative         Family History - reviewed:  Family History   Problem Relation Age of Onset    Diabetes Mother     Hypertension Mother     Alzheimer Mother     Pacemaker Mother     Heart Attack Father          Immunizations - reviewed: There is no immunization history for the selected administration types on file for this patient. ROS  CARDIOVASCULAR: Denies: chest pain, dyspnea on exertion  RESPIRATORY: Denies: shortness of breath  NEURO: Denies: headache  MUSCULOSKELETAL: muscle pain      Physical Exam  Visit Vitals    BP (!) 173/92 (BP 1 Location: Right arm, BP Patient Position: Sitting)    Pulse 88    Temp 98.4 °F (36.9 °C) (Oral)    Resp 16    Ht 5' 3\" (1.6 m)    Wt 143 lb (64.9 kg)    SpO2 95%    BMI 25.33 kg/m2       General appearance - alert, well appearing, and in no distress  Chest - clear to auscultation, no wheezes, rales or rhonchi, symmetric air entry  Heart - normal rate, regular rhythm, normal S1, S2, no murmurs, rubs, clicks or gallops  Neurological - alert, oriented, normal speech, no focal findings or movement disorder noted  Musculoskeletal - there is pinpoint TTP in the right mid-buttock area. There is no deformity or alignment concerns.   There is no tenderness along midline spinous process in lumbar area. The bony prominences of the sacrum are not tender. Extremities - no pedal edema noted  Skin - normal coloration, no rashes, no suspicious skin lesions noted      Assessment/Plan    ICD-10-CM ICD-9-CM    1. Piriformis syndrome of right side G57.01 355.0 tiZANidine (ZANAFLEX) 4 mg tablet      traMADol (ULTRAM) 50 mg tablet     · Pt returns today for similar complaint as last visit. · Seems classic piriformis syndrome to me. I have offered to do Xrays to be thorough but given no bony tenderness and no recent trauma, patient and daughter opt not to do which I feel is reasonable. · Will try a muscle relaxer. Gave additional exercises beyond what Dr. Billy Correa gave to try. · Recommend heat before stretching. · At the end of the visit, I offered small supply of Tramadol as pt cannot take NSAIDs and is out of the Percocet now. Odd reaction by daughter who quickly got very upset that I would not give more Percocet and stated that this was a wasted visit, up until this point we seemed to have a great rapport. I explained that I do not typically prescribe Percocet for this problem and she showed extreme frustration and was regretful that the patient had been scheduled with me.  reviewed and appropriate. Follow-up Disposition: Not on File      I have discussed the diagnosis with the patient and the intended plan as seen in the above orders. The patient has received an after-visit summary and questions were answered concerning future plans. I have discussed medication side effects and warnings with the patient as well.       Jamel Bell, DO

## 2017-04-14 NOTE — PATIENT INSTRUCTIONS
Piriformis Syndrome: Care Instructions  Your Care Instructions    The piriformis muscle is deep under your rear end (buttock). One end of the muscle connects deep inside the pelvic area, and the other end attaches to the top of the thighbone. This muscle can press on the sciatic nerve that runs from your spine down your leg. When this happens, you may have pain, numbness, and tingling in the buttock and down the back of your leg. This is called piriformis syndrome. The pain may get worse when you sit for a long time or climb stairs. Also, you may be more likely to develop piriformis syndrome if you run or walk often. Your doctor will check for other causes of your pain before treating this syndrome. Treatment may include stretching exercises, massage, and medicine for the pain and swelling. If these do not help, you may get a shot of steroid medicine. Until the pain is gone, you may need to rest the muscle and limit activities like running. Exercises and a change in how you move and sit may be enough to stop the pressure on the nerve. Follow-up care is a key part of your treatment and safety. Be sure to make and go to all appointments, and call your doctor if you are having problems. It's also a good idea to know your test results and keep a list of the medicines you take. How can you care for yourself at home? · If your doctor thinks that strenuous exercise is causing your problem, stop or cut back on activities such as running. You may find swimming to be a good exercise for a while. · Stretch the piriformis muscle. Kush Alisha on your back. ¨ Bend one leg at the knee and keep the other leg flat on the ground. ¨ Raise your bent knee up and then move it across your body. Hold the outside of the knee with the opposite hand. ¨ Gently pull the knee with your hand toward the opposite shoulder. ¨ Hold the stretch for at least 15 to 30 seconds. Switch legs. ¨ Do the stretch several times each day.   · Massage the muscle to relieve pressure. ¨ Sit on the floor. Lean to one side so that the hip on your sore side is off the ground. Put a tennis ball under your buttock on that side. ¨ As you put weight onto the tennis ball, you may find spots that are especially sore. Move gently so that the tennis ball gently massages each of the sore spots. · Use ice or heat to help reduce pain. Put ice or a cold pack or a heating pad set on low or a warm cloth on the sore area for 10 to 20 minutes at a time. Put a thin cloth between the ice pack or heating pad and your skin. · Ask your doctor if you can take an over-the-counter pain medicine, such as acetaminophen (Tylenol), ibuprofen (Advil, Motrin), or naproxen (Aleve). Be safe with medicines. Read and follow all instructions on the label. · Have your doctor or a physical therapist watch how you move. You may need physical therapy or special inserts in your shoes (orthotics) to help you move in a way that does not put pressure on your nerves. When should you call for help? Watch closely for changes in your health, and be sure to contact your doctor if:  · You do not feel better after several weeks of home care. · Your pain gets worse. · Your leg becomes weak or numb. Where can you learn more? Go to http://katerin-celina.info/. Enter R028 in the search box to learn more about \"Piriformis Syndrome: Care Instructions. \"  Current as of: May 23, 2016  Content Version: 11.2  © 1805-1719 [employer]. Care instructions adapted under license by Jum[employer] (which disclaims liability or warranty for this information). If you have questions about a medical condition or this instruction, always ask your healthcare professional. [place] [address] any warranty or liability for your use of this information.        Piriformis Syndrome: Exercises  Your Care Instructions  Here are some examples of typical rehabilitation exercises for your condition. Start each exercise slowly. Ease off the exercise if you start to have pain. Your doctor or physical therapist will tell you when you can start these exercises and which ones will work best for you. How to do the exercises  Hip rotator stretch    1. Lie on your back with both knees bent and your feet flat on the floor. 2. Put the ankle of your affected leg on your opposite thigh near your knee. 3. Use your hand to gently push your knee (on your affected leg) away from your body until you feel a gentle stretch around your hip. 4. Hold the stretch for 15 to 30 seconds. 5. Repeat 2 to 4 times. 6. Switch legs and repeat steps 1 through 5. Piriformis stretch    1. Lie on your back with your legs straight. 2. Lift your affected leg and bend your knee. With your opposite hand, reach across your body, and then gently pull your knee toward your opposite shoulder. 3. Hold the stretch for 15 to 30 seconds. 4. Repeat with your other leg. 5. Repeat 2 to 4 times on each side. Lower abdominal strengthening    1. Lie on your back with your knees bent and your feet flat on the floor. 2. Tighten your belly muscles by pulling your belly button in toward your spine. 3. Lift one foot off the floor and bring your knee toward your chest, so that your knee is straight above your hip and your leg is bent like the letter \"L. \"  4. Lift the other knee up to the same position. 5. Lower one leg at a time to the starting position. 6. Keep alternating legs until you have lifted each leg 8 to 12 times. 7. Be sure to keep your belly muscles tight and your back still as you are moving your legs. Be sure to breathe normally. Follow-up care is a key part of your treatment and safety. Be sure to make and go to all appointments, and call your doctor if you are having problems. It's also a good idea to know your test results and keep a list of the medicines you take. Where can you learn more?   Go to http://katerin-celina.info/. Enter R575 in the search box to learn more about \"Piriformis Syndrome: Exercises. \"  Current as of: May 23, 2016  Content Version: 11.2  © 5016-8357 [employer]. Care instructions adapted under license by [employer] (which disclaims liability or warranty for this information). If you have questions about a medical condition or this instruction, always ask your healthcare professional. [place] [address] any warranty or liability for your use of this information.

## 2017-04-14 NOTE — TELEPHONE ENCOUNTER
TC received from pt's daughter who states that she has been in contact with her mother, who has had multiple c/o pain that is not relieved by her medications. Daughter is asking for a change in meds or dosage in meds. Explained to daughter that the pt's medication is considered a controlled substance that will need to be adjusted by the prescribing physician. Unfortunately, the MD is on vacation for this week. Advised daughter to have pt come in for a re-evaluation of her pain or take her to the ED. Daughter requested an appt to be seen in the office. Call transferred to [facility].

## 2017-04-20 ENCOUNTER — TELEPHONE (OUTPATIENT)
Dept: FAMILY MEDICINE CLINIC | Age: 67
End: 2017-04-20

## 2017-04-20 NOTE — TELEPHONE ENCOUNTER
You saw the patient on 4/14/17 and gave tramadol and zanaflex, would you want to prescribe something else or have her be seen again

## 2017-04-20 NOTE — TELEPHONE ENCOUNTER
Patient states medication that was prescribed to her isn't working. Patient states she can hardly walk and is in pain. Patient asking if something stronger can be prescribed.       Patient would like to be contacted ASAP

## 2017-04-26 NOTE — TELEPHONE ENCOUNTER
Message of 4/26. Called and spoke with daughter, Allyssa Gaviria, advising her of an appointment per Dr. Jagdish Brink regarding the pain medication. Said her mother is unable to get an appointment with her own physician of Dr. Noe Kat as having to wait now about three weeks since he went out on vacation. She was highly agitated and explained to her that he was on the schedule next week only two one/half days with no routine appointments available. The physicians are not on the schedule everyday and all day. She only wants to speak with Dr oNe Kat. Please forward to physician.

## 2017-08-14 ENCOUNTER — OFFICE VISIT (OUTPATIENT)
Dept: FAMILY MEDICINE CLINIC | Age: 67
End: 2017-08-14

## 2017-08-14 VITALS
SYSTOLIC BLOOD PRESSURE: 129 MMHG | RESPIRATION RATE: 16 BRPM | TEMPERATURE: 99.3 F | BODY MASS INDEX: 25.34 KG/M2 | WEIGHT: 143 LBS | OXYGEN SATURATION: 96 % | HEIGHT: 63 IN | DIASTOLIC BLOOD PRESSURE: 72 MMHG | HEART RATE: 83 BPM

## 2017-08-14 DIAGNOSIS — M25.551 RIGHT HIP PAIN: ICD-10-CM

## 2017-08-14 DIAGNOSIS — S32.591A PUBIC RAMUS FRACTURE, RIGHT, CLOSED, INITIAL ENCOUNTER (HCC): Primary | ICD-10-CM

## 2017-08-14 DIAGNOSIS — S76.211D GROIN STRAIN, RIGHT, SUBSEQUENT ENCOUNTER: ICD-10-CM

## 2017-08-14 DIAGNOSIS — G57.01 PIRIFORMIS SYNDROME OF RIGHT SIDE: ICD-10-CM

## 2017-08-14 RX ORDER — ALBUTEROL SULFATE 90 UG/1
2 AEROSOL, METERED RESPIRATORY (INHALATION)
Qty: 1 INHALER | Refills: 5 | Status: SHIPPED | OUTPATIENT
Start: 2017-08-14 | End: 2019-10-01 | Stop reason: SDUPTHER

## 2017-08-14 RX ORDER — OXYCODONE AND ACETAMINOPHEN 5; 325 MG/1; MG/1
1 TABLET ORAL EVERY 12 HOURS
Qty: 28 TAB | Refills: 0 | Status: SHIPPED | OUTPATIENT
Start: 2017-08-14 | End: 2017-08-25 | Stop reason: SDUPTHER

## 2017-08-14 NOTE — MR AVS SNAPSHOT
Visit Information Date & Time Provider Department Dept. Phone Encounter #  
 8/14/2017  2:20 PM Norman Iglesias [phone] [id number] Follow-up Instructions Return in about 4 weeks (around 9/11/2017) for Follow-up on hip pain. Upcoming Health Maintenance Date Due Hepatitis C Screening 1950 FOOT EXAM Q1 12/15/1960 MICROALBUMIN Q1 12/15/1960 EYE EXAM RETINAL OR DILATED Q1 12/15/1960 DTaP/Tdap/Td series (1 - Tdap) 12/15/1971 BREAST CANCER SCRN MAMMOGRAM 12/15/2000 ZOSTER VACCINE AGE 60> 10/15/2010 GLAUCOMA SCREENING Q2Y 12/15/2015 OSTEOPOROSIS SCREENING (DEXA) 12/15/2015 Pneumococcal 65+ Low/Medium Risk (1 of 2 - PCV13) 12/15/2015 MEDICARE YEARLY EXAM 12/15/2015 LIPID PANEL Q1 8/28/2016 HEMOGLOBIN A1C Q6M 4/23/2017 INFLUENZA AGE 9 TO ADULT 8/1/2017 COLONOSCOPY 8/3/2025 Allergies as of 8/14/2017  Review Complete On: 8/14/2017 By: Juancarlos Guerra LPN No Known Allergies Current Immunizations  Never Reviewed No immunizations on file. Not reviewed this visit You Were Diagnosed With   
  
 Codes Comments Right hip pain    -  Primary ICD-10-CM: M25.551 ICD-9-CM: 719.45 Groin strain, right, subsequent encounter     ICD-10-CM: S76.211D ICD-9-CM: V58.89 Piriformis syndrome of right side     ICD-10-CM: G57.01 
ICD-9-CM: 355.0 Vitals BP Pulse Temp Resp Height(growth percentile) Weight(growth percentile) 129/72 (BP 1 Location: Left arm, BP Patient Position: Sitting) 83 99.3 °F (37.4 °C) (Oral) 16 5' 3\" (1.6 m) 143 lb (64.9 kg) SpO2 BMI OB Status Smoking Status 96% 25.33 kg/m2 Hysterectomy Former Smoker Vitals History BMI and BSA Data Body Mass Index Body Surface Area  
 25.33 kg/m 2 1.7 m 2 Preferred Pharmacy Pharmacy Name Phone  [facility] [address] [facility] [phone] Your Updated Medication List  
  
   
This list is accurate as of: 8/14/17  2:59 PM.  Always use your most recent med list.  
  
  
  
  
 albuterol 90 mcg/actuation inhaler Commonly known as:  PROVENTIL HFA, VENTOLIN HFA, PROAIR HFA Take 2 Puffs by inhalation every four (4) hours as needed for Wheezing. amLODIPine 10 mg tablet Commonly known as:  Caralee Dupes Take 1 Tab by mouth nightly. Indications: hypertension  
  
 ferrous sulfate 325 mg (65 mg iron) tablet Take 1 Tab by mouth daily. losartan 100 mg tablet Commonly known as:  COZAAR Take 1 Tab by mouth daily. oxyCODONE-acetaminophen 5-325 mg per tablet Commonly known as:  PERCOCET Take 1 Tab by mouth every twelve (12) hours for 14 days. Max Daily Amount: 2 Tabs. tiZANidine 4 mg tablet Commonly known as:  Agnes Stairs Take 1 Tab by mouth every six (6) hours as needed. For muscle spasm and pain Prescriptions Printed Refills  
 oxyCODONE-acetaminophen (PERCOCET) 5-325 mg per tablet 0 Sig: Take 1 Tab by mouth every twelve (12) hours for 14 days. Max Daily Amount: 2 Tabs. Class: Print Route: Oral  
  
Follow-up Instructions Return in about 4 weeks (around 9/11/2017) for Follow-up on hip pain. To-Do List   
 08/14/2017 Imaging:  XR HIP RT W OR WO PELV 2-3 VWS Patient Instructions Hip Pain: Care Instructions Your Care Instructions Hip pain may be caused by many things, including overuse, a fall, or a twisting movement. Another cause of hip pain is arthritis. Your pain may increase when you stand up, walk, or squat. The pain may come and go or may be constant. Home treatment can help relieve hip pain, swelling, and stiffness. If your pain is ongoing, you may need more tests and treatment. Follow-up care is a key part of your treatment and safety.  Be sure to make and go to all appointments, and call your doctor if you are having problems. Its also a good idea to know your test results and keep a list of the medicines you take. How can you care for yourself at home? · Take pain medicines exactly as directed. ¨ If the doctor gave you a prescription medicine for pain, take it as prescribed. ¨ If you are not taking a prescription pain medicine, ask your doctor if you can take an over-the-counter medicine. · Rest and protect your hip. Take a break from any activity, including standing or walking, that may cause pain. · Put ice or a cold pack against your hip for 10 to 20 minutes at a time. Try to do this every 1 to 2 hours for the next 3 days (when you are awake) or until the swelling goes down. Put a thin cloth between the ice and your skin. · Sleep on your healthy side with a pillow between your knees, or sleep on your back with pillows under your knees. · If there is no swelling, you can put moist heat, a heating pad, or a warm cloth on your hip. Do gentle stretching exercises to help keep your hip flexible. · Learn how to prevent falls. Have your vision and hearing checked regularly. Wear slippers or shoes with a nonskid sole. · Stay at a healthy weight. · Wear comfortable shoes. When should you call for help? Call 911 anytime you think you may need emergency care. For example, call if: 
· You have sudden chest pain and shortness of breath, or you cough up blood. · You are not able to stand or walk or bear weight. · Your buttocks, legs, or feet feel numb or tingly. · Your leg or foot is cool or pale or changes color. · You have severe pain. Call your doctor now or seek immediate medical care if: 
· You have signs of infection, such as: 
¨ Increased pain, swelling, warmth, or redness in the hip area. ¨ Red streaks leading from the hip area. ¨ Pus draining from the hip area. ¨ A fever. · You have signs of a blood clot, such as: 
¨ Pain in your calf, back of the knee, thigh, or groin. ¨ Redness and swelling in your leg or groin. · You are not able to bend, straighten, or move your leg normally. · You have trouble urinating or having bowel movements. Watch closely for changes in your health, and be sure to contact your doctor if: 
· You do not get better as expected. Where can you learn more? Go to http://katerin-celina.info/. Enter G149 in the search box to learn more about \"Hip Pain: Care Instructions. \" Current as of: March 20, 2017 Content Version: 11.3 © 8364-5208 [employer]. Care instructions adapted under license by [employer] (which disclaims liability or warranty for this information). If you have questions about a medical condition or this instruction, always ask your healthcare professional. [address] any warranty or liability for your use of this information. Introducing [facility]! Annie Ortiz introduces Iotum patient portal. Now you can access parts of your medical record, email your doctor's office, and request medication refills online. 1. In your internet browser, go to [email or web address] 2. Click on the First Time User? Click Here link in the Sign In box. You will see the New Member Sign Up page. 3. Enter your Iotum Access Code exactly as it appears below. You will not need to use this code after youve completed the sign-up process. If you do not sign up before the expiration date, you must request a new code. · Iotum Access Code: [email or web address] Expires: 10/8/2017  3:19 PM 
 
4. Enter the last four digits of your Social Security Number (xxxx) and Date of Birth (mm/dd/yyyy) as indicated and click Submit. You will be taken to the next sign-up page. 5. Create a Iotum ID. This will be your Iotum login ID and cannot be changed, so think of one that is secure and easy to remember. 6. Create a Strikeface password. You can change your password at any time. 7. Enter your Password Reset Question and Answer. This can be used at a later time if you forget your password. 8. Enter your e-mail address. You will receive e-mail notification when new information is available in [address] 9. Click Sign Up. You can now view and download portions of your medical record. 10. Click the Download Summary menu link to download a portable copy of your medical information. If you have questions, please visit the Frequently Asked Questions section of the Strikeface website. Remember, Strikeface is NOT to be used for urgent needs. For medical emergencies, dial 911. Now available from your iPhone and Android! Please provide this summary of care documentation to your next provider. If you have any questions after today's visit, please call [phone].

## 2017-08-14 NOTE — PATIENT INSTRUCTIONS
Hip Pain: Care Instructions  Your Care Instructions  Hip pain may be caused by many things, including overuse, a fall, or a twisting movement. Another cause of hip pain is arthritis. Your pain may increase when you stand up, walk, or squat. The pain may come and go or may be constant. Home treatment can help relieve hip pain, swelling, and stiffness. If your pain is ongoing, you may need more tests and treatment. Follow-up care is a key part of your treatment and safety. Be sure to make and go to all appointments, and call your doctor if you are having problems. Its also a good idea to know your test results and keep a list of the medicines you take. How can you care for yourself at home? · Take pain medicines exactly as directed. ¨ If the doctor gave you a prescription medicine for pain, take it as prescribed. ¨ If you are not taking a prescription pain medicine, ask your doctor if you can take an over-the-counter medicine. · Rest and protect your hip. Take a break from any activity, including standing or walking, that may cause pain. · Put ice or a cold pack against your hip for 10 to 20 minutes at a time. Try to do this every 1 to 2 hours for the next 3 days (when you are awake) or until the swelling goes down. Put a thin cloth between the ice and your skin. · Sleep on your healthy side with a pillow between your knees, or sleep on your back with pillows under your knees. · If there is no swelling, you can put moist heat, a heating pad, or a warm cloth on your hip. Do gentle stretching exercises to help keep your hip flexible. · Learn how to prevent falls. Have your vision and hearing checked regularly. Wear slippers or shoes with a nonskid sole. · Stay at a healthy weight. · Wear comfortable shoes. When should you call for help? Call 911 anytime you think you may need emergency care. For example, call if:  · You have sudden chest pain and shortness of breath, or you cough up blood.   · You are not able to stand or walk or bear weight. · Your buttocks, legs, or feet feel numb or tingly. · Your leg or foot is cool or pale or changes color. · You have severe pain. Call your doctor now or seek immediate medical care if:  · You have signs of infection, such as:  ¨ Increased pain, swelling, warmth, or redness in the hip area. ¨ Red streaks leading from the hip area. ¨ Pus draining from the hip area. ¨ A fever. · You have signs of a blood clot, such as:  ¨ Pain in your calf, back of the knee, thigh, or groin. ¨ Redness and swelling in your leg or groin. · You are not able to bend, straighten, or move your leg normally. · You have trouble urinating or having bowel movements. Watch closely for changes in your health, and be sure to contact your doctor if:  · You do not get better as expected. Where can you learn more? Go to http://katerin-celina.info/. Enter J646 in the search box to learn more about \"Hip Pain: Care Instructions. \"  Current as of: March 20, 2017  Content Version: 11.3  © 9096-6659 [employer]. Care instructions adapted under license by [employer] (which disclaims liability or warranty for this information). If you have questions about a medical condition or this instruction, always ask your healthcare professional. [place] [address] any warranty or liability for your use of this information.

## 2017-08-14 NOTE — PROGRESS NOTES
Gustavo Jauregui  77 y.o. female  1950  [address] [facility] [address]  [address] [place] [address] Dr: Progress Note  Eliezer Murdock MD       Encounter Date: 8/14/2017    Chief Complaint   Patient presents with    Leg Pain     History of Present Illness   Gustavo Jauregui is a 77 y.o. female who presents to clinic today for right sided groin/hip pain. Had been present for several weeks. Denies fall, constipation. OTC pain medication not providing significant relief. She notes recent day at work where she had to work 8 hours and had significant pain the longer she stood on her feet. Review of Systems   Review of Systems   Musculoskeletal: Positive for joint pain. Negative for falls. Vitals/Objective:     Vitals:    08/14/17 1400   BP: 129/72   Pulse: 83   Resp: 16   Temp: 99.3 °F (37.4 °C)   TempSrc: Oral   SpO2: 96%   Weight: 143 lb (64.9 kg)   Height: 5' 3\" (1.6 m)     Body mass index is 25.33 kg/(m^2). Physical Exam   Abdominal: There is no tenderness. There is no CVA tenderness. Hernia confirmed negative in the right inguinal area. Musculoskeletal:        Right hip: She exhibits tenderness (On palpation. No pain with internal or external rotation at the hip.  ). She exhibits normal range of motion, normal strength and no deformity (No bulging). Lumbar back: She exhibits tenderness (Right sided pyraformis pain). Legs:  Lymphadenopathy:        Right: No inguinal adenopathy present. XR Results (most recent):    Results from Appointment encounter on 08/14/17   XR HIP RT W OR WO PELV 2-3 VWS   Narrative EXAM:  XR HIP RT W OR WO PELV 2-3 VWS    INDICATION:   hip pain. COMPARISON: None. FINDINGS: An AP view of the pelvis and a frogleg lateral view of the right hip  demonstrate a fracture of the medial aspect of the right superior pubic ramus  which could be subacute and appears to extend to at the acetabulum.  There also  some deformity of the inferior pubic ramus on the right side. If clinically  appropriate CT of the pelvis can be obtained for further delineation. The right  hip joint appears preserved. Impression impression: Subacute fracture suspected. .                Assessment and Plan:   1. Pubic ramus fracture, right, closed, initial encounter ([facility] [address]  Will provide oxycodone. Recommendation for early return to movement. 2. Right hip pain  - XR HIP RT W OR WO PELV 2-3 VWS; Future    3. Groin strain, right, subsequent encounter  4. Piriformis syndrome of right side      I have discussed the diagnosis with the patient and the intended plan as seen in the above orders. she has expressed understanding. The patient has received an after-visit summary and questions were answered concerning future plans. I have discussed medication side effects and warnings with the patient as well. Follow-up Disposition:  Return in about 4 weeks (around 9/11/2017) for Follow-up on hip pain. Electronically Signed: Jigna Borges MD     History   Patients past medical, surgical and family histories were reviewed and updated.     Past Medical History:   Diagnosis Date    Hard of hearing     Hypertension     Iron deficiency anemia     baseline 9.7 on 8/2015    PUD (peptic ulcer disease)     hospitalized at [facility], EGD showed a nonbleeding esophageal ulcer, multiple gastric ulcers    Pulmonary nodule 8/2/2015    CT chest: 2.8 cm focal airspace disease RUL, pna vs mass, referred to pulm with PET     Past Surgical History:   Procedure Laterality Date    HX MALIGNANT SKIN LESION EXCISION Right under  eye    HX PARTIAL HYSTERECTOMY Bilateral      Family History   Problem Relation Age of Onset    Diabetes Mother     Hypertension Mother     Alzheimer Mother     Pacemaker Mother     Heart Attack Father      Social History     Social History    Marital status:      Spouse name: N/A    Number of children: N/A    Years of education: N/A     Occupational History    Not on file. Social History Main Topics    Smoking status: Former Smoker     Packs/day: 0.25     Years: 30.00     Quit date: 10/16/2016    Smokeless tobacco: Never Used    Alcohol use 0.0 oz/week     0 Standard drinks or equivalent per week      Comment: very rare    Drug use: No    Sexual activity: Not on file     Other Topics Concern    Not on file     Social History Narrative            Current Medications/Allergies     Current Outpatient Prescriptions   Medication Sig Dispense Refill    losartan (COZAAR) 100 mg tablet Take 1 Tab by mouth daily. 30 Tab 5    amLODIPine (NORVASC) 10 mg tablet Take 1 Tab by mouth nightly. Indications: hypertension 30 Tab 5    albuterol (PROVENTIL HFA, VENTOLIN HFA, PROAIR HFA) 90 mcg/actuation inhaler Take 2 Puffs by inhalation every four (4) hours as needed for Wheezing. 1 Inhaler 5    tiZANidine (ZANAFLEX) 4 mg tablet Take 1 Tab by mouth every six (6) hours as needed. For muscle spasm and pain 30 Tab 1    traMADol (ULTRAM) 50 mg tablet Take 1 Tab by mouth every six (6) hours as needed for Pain. Max Daily Amount: 200 mg. 20 Tab 0    ferrous sulfate 325 mg (65 mg iron) tablet Take 1 Tab by mouth daily.  30 Tab 5     No Known Allergies

## 2017-08-25 ENCOUNTER — OFFICE VISIT (OUTPATIENT)
Dept: FAMILY MEDICINE CLINIC | Age: 67
End: 2017-08-25

## 2017-08-25 VITALS
TEMPERATURE: 99.1 F | RESPIRATION RATE: 16 BRPM | DIASTOLIC BLOOD PRESSURE: 82 MMHG | WEIGHT: 150 LBS | HEIGHT: 63 IN | BODY MASS INDEX: 26.58 KG/M2 | SYSTOLIC BLOOD PRESSURE: 178 MMHG | OXYGEN SATURATION: 99 % | HEART RATE: 89 BPM

## 2017-08-25 DIAGNOSIS — S76.211D GROIN STRAIN, RIGHT, SUBSEQUENT ENCOUNTER: ICD-10-CM

## 2017-08-25 DIAGNOSIS — M25.551 RIGHT HIP PAIN: ICD-10-CM

## 2017-08-25 DIAGNOSIS — G57.01 PIRIFORMIS SYNDROME OF RIGHT SIDE: ICD-10-CM

## 2017-08-25 DIAGNOSIS — S32.591D PUBIC RAMUS FRACTURE, RIGHT, WITH ROUTINE HEALING, SUBSEQUENT ENCOUNTER: Primary | ICD-10-CM

## 2017-08-25 RX ORDER — OXYCODONE AND ACETAMINOPHEN 5; 325 MG/1; MG/1
1-2 TABLET ORAL
Qty: 84 TAB | Refills: 0 | Status: SHIPPED | OUTPATIENT
Start: 2017-08-25 | End: 2017-09-14 | Stop reason: SDUPTHER

## 2017-08-25 NOTE — PATIENT INSTRUCTIONS
Broken Pelvis: Care Instructions  Your Care Instructions    The pelvis is the ring of bones between your hips. It connects to the spine and to the leg bones at the hip joints. Blood vessels, nerves, and muscles run through the pelvic ring and can be affected by a break. A broken pelvis also can affect the organs in your pelvic area. A broken pelvis may need a few months to heal. You may have had surgery to repair your pelvis, depending on where it was broken and how bad the break was. Your doctor may have put metal screws, pins, or a irma in your pelvis to fix the break. In some cases, surgery is not needed. While your pelvis heals, you will need to keep weight off the hips. Once you are able to walk, a walker or crutches can help you get around. You can help your pelvis heal with care at home. Your doctor may prescribe medicine to relieve pain and prevent blood clots. You heal best when you take good care of yourself. Eat a variety of healthy foods, and don't smoke. Follow-up care is a key part of your treatment and safety. Be sure to make and go to all appointments, and call your doctor if you are having problems. It's also a good idea to know your test results and keep a list of the medicines you take. How can you care for yourself at home? · Put ice or a cold pack on the painful area for 10 to 20 minutes at a time. Try to do this every 1 to 2 hours for the next 3 days (when you are awake). Put a thin cloth between the ice and your skin. · Be safe with medicines. Take pain medicines exactly as directed. ¨ If the doctor gave you a prescription medicine for pain, take it as prescribed. ¨ If you are not taking a prescription pain medicine, ask your doctor if you can take an over-the-counter medicine. · Put only as much weight on each leg as your doctor tells you to. He or she may advise you to use crutches, a walker, or a cane to help you walk. · Avoid constipation.   ¨ Include fruits, vegetables, beans, and whole grains in your diet each day. These foods are high in fiber. ¨ Drink plenty of fluids, enough so that your urine is light yellow or clear like water. If you have kidney, heart, or liver disease and have to limit fluids, talk with your doctor before you increase the amount of fluids you drink. ¨ Get some exercise every day, once you are able to walk and your doctor tells you it is okay to exercise. Build up slowly to 30 to 60 minutes a day on 5 or more days of the week. ¨ Take a fiber supplement, such as Citrucel or Metamucil, every day if needed. Read and follow all instructions on the label. ¨ Schedule time each day for a bowel movement. Having a daily routine may help. Take your time and do not strain when having a bowel movement. When should you call for help? Call 911 anytime you think you may need emergency care. For example, call if:  · You have symptoms of a blood clot in your lung (called a pulmonary embolism). These may include:  ¨ Sudden chest pain. ¨ Trouble breathing. ¨ Coughing up blood. Call your doctor now or seek immediate medical care if:  · You have increased or severe pain. · Your leg or foot is cool, pale, or changes color. · You have tingling, weakness, or numbness in your foot and toes. · You cannot move your toes. · You have signs of a blood clot, such as:  ¨ Pain in your calf, back of the knee, thigh, or groin. ¨ Redness and swelling in your leg or groin. · You have a nosebleed. · Your gums bleed when you brush your teeth. · You have blood in your urine. · You have trouble urinating. Watch closely for changes in your health, and be sure to contact your doctor if:  · You do not get better as expected. Where can you learn more? Go to http://katerin-celina.info/. Enter G867 in the search box to learn more about \"Broken Pelvis: Care Instructions. \"  Current as of: March 21, 2017  Content Version: 11.3  © 1782-4959 [employer], tok tok tok. Care instructions adapted under license by [employer] (which disclaims liability or warranty for this information). If you have questions about a medical condition or this instruction, always ask your healthcare professional. [address] any warranty or liability for your use of this information.

## 2017-08-25 NOTE — MR AVS SNAPSHOT
Visit Information Date & Time Provider Department Dept. Phone Encounter #  
 8/25/2017  2:20 PM Darby Cortez, Norman Bhatti [phone] [id number] Follow-up Instructions Return in about 2 weeks (around 9/8/2017). Upcoming Health Maintenance Date Due Hepatitis C Screening 1950 FOOT EXAM Q1 12/15/1960 MICROALBUMIN Q1 12/15/1960 EYE EXAM RETINAL OR DILATED Q1 12/15/1960 DTaP/Tdap/Td series (1 - Tdap) 12/15/1971 BREAST CANCER SCRN MAMMOGRAM 12/15/2000 ZOSTER VACCINE AGE 60> 10/15/2010 GLAUCOMA SCREENING Q2Y 12/15/2015 OSTEOPOROSIS SCREENING (DEXA) 12/15/2015 Pneumococcal 65+ Low/Medium Risk (1 of 2 - PCV13) 12/15/2015 MEDICARE YEARLY EXAM 12/15/2015 LIPID PANEL Q1 8/28/2016 HEMOGLOBIN A1C Q6M 4/23/2017 INFLUENZA AGE 9 TO ADULT 8/1/2017 COLONOSCOPY 8/3/2025 Allergies as of 8/25/2017  Review Complete On: 8/25/2017 By: Luca Jaramillo LPN No Known Allergies Current Immunizations  Never Reviewed No immunizations on file. Not reviewed this visit You Were Diagnosed With   
  
 Codes Comments Pubic ramus fracture, right, with routine healing, subsequent encounter    -  Primary ICD-10-CM: S32.591D ICD-9-CM: V54.19 Right hip pain     ICD-10-CM: M25.551 ICD-9-CM: 719.45 Groin strain, right, subsequent encounter     ICD-10-CM: S76.211D ICD-9-CM: V58.89 Piriformis syndrome of right side     ICD-10-CM: G57.01 
ICD-9-CM: 355.0 Vitals BP Pulse Temp Resp Height(growth percentile) Weight(growth percentile) 178/82 89 99.1 °F (37.3 °C) (Oral) 16 5' 3\" (1.6 m) 150 lb (68 kg) SpO2 BMI OB Status Smoking Status 99% 26.57 kg/m2 Hysterectomy Former Smoker Vitals History BMI and BSA Data Body Mass Index Body Surface Area  
 26.57 kg/m 2 1.74 m 2 Preferred Pharmacy Pharmacy Name Phone [facility], [address] [place] [address] Your Updated Medication List  
  
   
This list is accurate as of: 8/25/17  2:52 PM.  Always use your most recent med list.  
  
  
  
  
 albuterol 90 mcg/actuation inhaler Commonly known as:  PROVENTIL HFA, VENTOLIN HFA, PROAIR HFA Take 2 Puffs by inhalation every four (4) hours as needed for Wheezing. amLODIPine 10 mg tablet Commonly known as:  Yoandy Marcus Take 1 Tab by mouth nightly. Indications: hypertension  
  
 ferrous sulfate 325 mg (65 mg iron) tablet Take 1 Tab by mouth daily. losartan 100 mg tablet Commonly known as:  COZAAR Take 1 Tab by mouth daily. oxyCODONE-acetaminophen 5-325 mg per tablet Commonly known as:  PERCOCET Take 1-2 Tabs by mouth every eight (8) hours as needed for Pain for up to 14 days. Max Daily Amount: 6 Tabs. tiZANidine 4 mg tablet Commonly known as:  Lyons Russell Take 1 Tab by mouth every six (6) hours as needed. For muscle spasm and pain Prescriptions Printed Refills  
 oxyCODONE-acetaminophen (PERCOCET) 5-325 mg per tablet 0 Sig: Take 1-2 Tabs by mouth every eight (8) hours as needed for Pain for up to 14 days. Max Daily Amount: 6 Tabs. Class: Print Route: Oral  
  
Follow-up Instructions Return in about 2 weeks (around 9/8/2017). Patient Instructions Broken Pelvis: Care Instructions Your Care Instructions The pelvis is the ring of bones between your hips. It connects to the spine and to the leg bones at the hip joints. Blood vessels, nerves, and muscles run through the pelvic ring and can be affected by a break. A broken pelvis also can affect the organs in your pelvic area.  
A broken pelvis may need a few months to heal. You may have had surgery to repair your pelvis, depending on where it was broken and how bad the break was. Your doctor may have put metal screws, pins, or a irma in your pelvis to fix the break. In some cases, surgery is not needed. While your pelvis heals, you will need to keep weight off the hips. Once you are able to walk, a walker or crutches can help you get around. You can help your pelvis heal with care at home. Your doctor may prescribe medicine to relieve pain and prevent blood clots. You heal best when you take good care of yourself. Eat a variety of healthy foods, and don't smoke. Follow-up care is a key part of your treatment and safety. Be sure to make and go to all appointments, and call your doctor if you are having problems. It's also a good idea to know your test results and keep a list of the medicines you take. How can you care for yourself at home? · Put ice or a cold pack on the painful area for 10 to 20 minutes at a time. Try to do this every 1 to 2 hours for the next 3 days (when you are awake). Put a thin cloth between the ice and your skin. · Be safe with medicines. Take pain medicines exactly as directed. ¨ If the doctor gave you a prescription medicine for pain, take it as prescribed. ¨ If you are not taking a prescription pain medicine, ask your doctor if you can take an over-the-counter medicine. · Put only as much weight on each leg as your doctor tells you to. He or she may advise you to use crutches, a walker, or a cane to help you walk. · Avoid constipation. ¨ Include fruits, vegetables, beans, and whole grains in your diet each day. These foods are high in fiber. ¨ Drink plenty of fluids, enough so that your urine is light yellow or clear like water. If you have kidney, heart, or liver disease and have to limit fluids, talk with your doctor before you increase the amount of fluids you drink. ¨ Get some exercise every day, once you are able to walk and your doctor tells you it is okay to exercise. Build up slowly to 30 to 60 minutes a day on 5 or more days of the week. ¨ Take a fiber supplement, such as Citrucel or Metamucil, every day if needed. Read and follow all instructions on the label. ¨ Schedule time each day for a bowel movement. Having a daily routine may help. Take your time and do not strain when having a bowel movement. When should you call for help? Call 911 anytime you think you may need emergency care. For example, call if: 
· You have symptoms of a blood clot in your lung (called a pulmonary embolism). These may include: 
¨ Sudden chest pain. ¨ Trouble breathing. ¨ Coughing up blood. Call your doctor now or seek immediate medical care if: 
· You have increased or severe pain. · Your leg or foot is cool, pale, or changes color. · You have tingling, weakness, or numbness in your foot and toes. · You cannot move your toes. · You have signs of a blood clot, such as: 
¨ Pain in your calf, back of the knee, thigh, or groin. ¨ Redness and swelling in your leg or groin. · You have a nosebleed. · Your gums bleed when you brush your teeth. · You have blood in your urine. · You have trouble urinating. Watch closely for changes in your health, and be sure to contact your doctor if: 
· You do not get better as expected. Where can you learn more? Go to http://katerin-celina.info/. Enter I955 in the search box to learn more about \"Broken Pelvis: Care Instructions. \" Current as of: March 21, 2017 Content Version: 11.3 © 2063-2366 [employer] Care instructions adapted under license by [employer] (which disclaims liability or warranty for this information). If you have questions about a medical condition or this instruction, always ask your healthcare professional. [place] [address] any warranty or liability for your use of this information. Introducing [facility]!    
 Carla Disla introduces [employer] patient portal. Now you can access parts of your medical record, email your doctor's office, and request medication refills online. 1. In your internet browser, go to [email or web address] 2. Click on the First Time User? Click Here link in the Sign In box. You will see the New Member Sign Up page. 3. Enter your [employer] Access Code exactly as it appears below. You will not need to use this code after youve completed the sign-up process. If you do not sign up before the expiration date, you must request a new code. · [employer] Access Code: [email or web address] Expires: 10/8/2017  3:19 PM 
 
4. Enter the last four digits of your Social Security Number (xxxx) and Date of Birth (mm/dd/yyyy) as indicated and click Submit. You will be taken to the next sign-up page. 5. Create a [employer] ID. This will be your [employer] login ID and cannot be changed, so think of one that is secure and easy to remember. 6. Create a [employer] password. You can change your password at any time. 7. Enter your Password Reset Question and Answer. This can be used at a later time if you forget your password. 8. Enter your e-mail address. You will receive e-mail notification when new information is available in [address] 9. Click Sign Up. You can now view and download portions of your medical record. 10. Click the Download Summary menu link to download a portable copy of your medical information. If you have questions, please visit the Frequently Asked Questions section of the [employer] website. Remember, [employer] is NOT to be used for urgent needs. For medical emergencies, dial 911. Now available from your iPhone and Android! Please provide this summary of care documentation to your next provider. Your primary care clinician is listed as Jim Mccarthy. If you have any questions after today's visit, please call [phone].

## 2017-08-25 NOTE — PROGRESS NOTES
Chief Complaint   Patient presents with    Medication Evaluation     1. Have you been to the ER, urgent care clinic since your last visit? Hospitalized since your last visit? No    2. Have you seen or consulted any other health care providers outside of the [address] [place] [address] since your last visit? Include any pap smears or colon screening.  No    Wants to see if  can increase dose of oxycodone    Doing exercises on her own--no PT    Has used ice pack on lower pelvic area

## 2017-08-31 NOTE — PROGRESS NOTES
Neymar Rodrigues  77 y.o. female  1950  [address] [facility] [address]  [address] [place] [address] Dr: Progress Note  Humberto Cali MD       Encounter Date: 8/25/2017    Chief Complaint   Patient presents with    Medication Evaluation     History of Present Illness   Nyemar Rodrigues is a 77 y.o. female who presents to clinic today for follow-up on hip pain. Recently diagnosed with a pubic ramus fracture. She notes that she has had to take two tablets to control her pain. Denies any falls. No longer requires wheelchair. Would like to return to work if able       Review of Systems   Review of Systems   Musculoskeletal: Positive for joint pain. Negative for falls. Vitals/Objective:     Vitals:    08/25/17 1404   BP: 178/82   Pulse: 89   Resp: 16   Temp: 99.1 °F (37.3 °C)   TempSrc: Oral   SpO2: 99%   Weight: 150 lb (68 kg)   Height: 5' 3\" (1.6 m)     Body mass index is 26.57 kg/(m^2). Physical Exam   Constitutional: She is oriented to person, place, and time. No distress. Cardiovascular: Normal rate, regular rhythm, normal heart sounds and intact distal pulses. Exam reveals no gallop and no friction rub. No murmur heard. Pulmonary/Chest: Effort normal and breath sounds normal.   Musculoskeletal:        Right hip: She exhibits tenderness. Neurological: She is alert and oriented to person, place, and time. Psychiatric: She has a normal mood and affect. Vitals reviewed. Assessment and Plan:   1. Pubic ramus fracture, right, with routine healing, subsequent encounter  Increasing frequency and # of oxycodone. Continue weight bearing and early rehab exercises. Plan for percocet is for short tern treatment. May begin to wean cara    2. Right hip pain  - oxyCODONE-acetaminophen (PERCOCET) 5-325 mg per tablet; Take 1-2 Tabs by mouth every eight (8) hours as needed for Pain for up to 14 days. Max Daily Amount: 6 Tabs. Dispense: 84 Tab; Refill: 0    3. Groin strain, right, subsequent encounter  - oxyCODONE-acetaminophen (PERCOCET) 5-325 mg per tablet; Take 1-2 Tabs by mouth every eight (8) hours as needed for Pain for up to 14 days. Max Daily Amount: 6 Tabs. Dispense: 84 Tab; Refill: 0    4. Piriformis syndrome of right side  - oxyCODONE-acetaminophen (PERCOCET) 5-325 mg per tablet; Take 1-2 Tabs by mouth every eight (8) hours as needed for Pain for up to 14 days. Max Daily Amount: 6 Tabs. Dispense: 84 Tab; Refill: 0    BP elevated likely secondary to pain. Will continue to monitor. I have discussed the diagnosis with the patient and the intended plan as seen in the above orders. she has expressed understanding. The patient has received an after-visit summary and questions were answered concerning future plans. I have discussed medication side effects and warnings with the patient as well. Follow-up Disposition:  Return in about 2 weeks (around 9/8/2017). Electronically Signed: Janet Ribeiro MD     History   Patients past medical, surgical and family histories were reviewed and updated.     Past Medical History:   Diagnosis Date    Hard of hearing     Hypertension     Iron deficiency anemia     baseline 9.7 on 8/2015    PUD (peptic ulcer disease)     hospitalized at [facility], EGD showed a nonbleeding esophageal ulcer, multiple gastric ulcers    Pulmonary nodule 8/2/2015    CT chest: 2.8 cm focal airspace disease RUL, pna vs mass, referred to pulm with PET     Past Surgical History:   Procedure Laterality Date    HX MALIGNANT SKIN LESION EXCISION Right under  eye    HX PARTIAL HYSTERECTOMY Bilateral      Family History   Problem Relation Age of Onset    Diabetes Mother     Hypertension Mother     Alzheimer Mother     Pacemaker Mother     Heart Attack Father      Social History     Social History    Marital status:      Spouse name: N/A    Number of children: N/A    Years of education: N/A     Occupational History    Not on file. Social History Main Topics    Smoking status: Former Smoker     Packs/day: 0.25     Years: 30.00     Quit date: 10/16/2016    Smokeless tobacco: Never Used    Alcohol use 0.0 oz/week     0 Standard drinks or equivalent per week      Comment: very rare    Drug use: No    Sexual activity: Not on file     Other Topics Concern    Not on file     Social History Narrative            Current Medications/Allergies     Current Outpatient Prescriptions   Medication Sig Dispense Refill    oxyCODONE-acetaminophen (PERCOCET) 5-325 mg per tablet Take 1-2 Tabs by mouth every eight (8) hours as needed for Pain for up to 14 days. Max Daily Amount: 6 Tabs. 84 Tab 0    albuterol (PROVENTIL HFA, VENTOLIN HFA, PROAIR HFA) 90 mcg/actuation inhaler Take 2 Puffs by inhalation every four (4) hours as needed for Wheezing. 1 Inhaler 5    losartan (COZAAR) 100 mg tablet Take 1 Tab by mouth daily. 30 Tab 5    amLODIPine (NORVASC) 10 mg tablet Take 1 Tab by mouth nightly.  Indications: hypertension 30 Tab 5     No Known Allergies

## 2017-09-14 ENCOUNTER — OFFICE VISIT (OUTPATIENT)
Dept: FAMILY MEDICINE CLINIC | Age: 67
End: 2017-09-14

## 2017-09-14 VITALS
DIASTOLIC BLOOD PRESSURE: 112 MMHG | BODY MASS INDEX: 26.58 KG/M2 | HEIGHT: 63 IN | WEIGHT: 150 LBS | TEMPERATURE: 99.4 F | RESPIRATION RATE: 16 BRPM | HEART RATE: 82 BPM | SYSTOLIC BLOOD PRESSURE: 189 MMHG

## 2017-09-14 DIAGNOSIS — S32.591D PUBIC RAMUS FRACTURE, RIGHT, WITH ROUTINE HEALING, SUBSEQUENT ENCOUNTER: Primary | ICD-10-CM

## 2017-09-14 DIAGNOSIS — I10 ESSENTIAL HYPERTENSION: ICD-10-CM

## 2017-09-14 RX ORDER — OXYCODONE AND ACETAMINOPHEN 5; 325 MG/1; MG/1
1-2 TABLET ORAL
Qty: 84 TAB | Refills: 0 | Status: SHIPPED | OUTPATIENT
Start: 2017-09-14 | End: 2017-09-28 | Stop reason: SDUPTHER

## 2017-09-14 RX ORDER — LOSARTAN POTASSIUM 100 MG/1
100 TABLET ORAL DAILY
Qty: 30 TAB | Refills: 5 | Status: SHIPPED | OUTPATIENT
Start: 2017-09-14 | End: 2018-02-22 | Stop reason: SDUPTHER

## 2017-09-14 RX ORDER — AMLODIPINE BESYLATE 10 MG/1
10 TABLET ORAL
Qty: 30 TAB | Refills: 5 | Status: SHIPPED | OUTPATIENT
Start: 2017-09-14 | End: 2018-02-22 | Stop reason: SDUPTHER

## 2017-09-14 NOTE — PROGRESS NOTES
Dylon Ward  77 y.o. female  1950  [address] [facility] [address]  [address] [place] [address] Dr: Progress Note  Jess Fernandez MD       Encounter Date: 9/14/2017    Chief Complaint   Patient presents with    Pelvic Pain     right hip     History of Present Illness   Dylon Ward is a 77 y.o. female who presents to clinic today for follow-up on pain from fracture. Notes she continues to have significant pain. 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 Respiratory: Negative for shortness of breath. Cardiovascular: Negative for chest pain and palpitations. Musculoskeletal: Positive for joint pain. Neurological: Negative for headaches. Vitals/Objective:     Vitals:    09/14/17 1504 09/14/17 1508 09/14/17 1527   BP: (!) 216/105 (!) 203/101 (!) 189/112   Pulse: 86 85 82   Resp: 16     Temp: 99.4 °F (37.4 °C)     TempSrc: Oral     Weight: 150 lb (68 kg)     Height: 5' 3\" (1.6 m)       Body mass index is 26.57 kg/(m^2). Physical Exam   Abdominal: There is no tenderness. There is no CVA tenderness. Hernia confirmed negative in the right inguinal area. Musculoskeletal:        Right hip: She exhibits tenderness (On palpation. No pain with internal or external rotation at the hip.  ). She exhibits normal range of motion, normal strength and no deformity (No bulging). Lumbar back: She exhibits tenderness (Right sided pyraformis pain). Legs:  Lymphadenopathy:        Right: No inguinal adenopathy present. Assessment and Plan:   1. Pubic ramus fracture, right, with routine healing, subsequent encounter   reviewed. Opioids planned to be temporary due to fracture. Will be due to to being weaning likely at next appointment. Will see back in 2 weeks. - oxyCODONE-acetaminophen (PERCOCET) 5-325 mg per tablet;  Take 1-2 Tabs by mouth every eight (8) hours as needed for Pain for up to 14 days. Max Daily Amount: 6 Tabs. Dispense: 84 Tab; Refill: 0    2. Essential hypertension  Out of medications. Refilled today. Reviewed risk of stroke if she goes periods of time with blood pressures as elevated as today. - losartan (COZAAR) 100 mg tablet; Take 1 Tab by mouth daily. Dispense: 30 Tab; Refill: 5  - amLODIPine (NORVASC) 10 mg tablet; Take 1 Tab by mouth nightly. Indications: hypertension  Dispense: 30 Tab; Refill: 5    I have discussed the diagnosis with the patient and the intended plan as seen in the above orders. she has expressed understanding. The patient has received an after-visit summary and questions were answered concerning future plans. I have discussed medication side effects and warnings with the patient as well. Follow-up Disposition:  Return in about 2 weeks (around 9/28/2017). Electronically Signed: Hemalatha Prajapati MD       History   Patients past medical, surgical and family histories were reviewed and updated. Past Medical History:   Diagnosis Date    Hard of hearing     Hypertension     Iron deficiency anemia     baseline 9.7 on 8/2015    PUD (peptic ulcer disease)     hospitalized at [facility] [place], EGD showed a nonbleeding esophageal ulcer, multiple gastric ulcers    Pulmonary nodule 8/2/2015    CT chest: 2.8 cm focal airspace disease RUL, pna vs mass, referred to pulm with PET     Past Surgical History:   Procedure Laterality Date    HX MALIGNANT SKIN LESION EXCISION Right under  eye    HX PARTIAL HYSTERECTOMY Bilateral      Family History   Problem Relation Age of Onset    Diabetes Mother     Hypertension Mother     Alzheimer Mother     Pacemaker Mother     Heart Attack Father      Social History     Social History    Marital status:      Spouse name: N/A    Number of children: N/A    Years of education: N/A     Occupational History    Not on file.      Social History Main Topics    Smoking status: Former Smoker Packs/day: 0.25     Years: 30.00     Quit date: 10/16/2016    Smokeless tobacco: Never Used    Alcohol use 0.0 oz/week     0 Standard drinks or equivalent per week      Comment: very rare    Drug use: No    Sexual activity: Not on file     Other Topics Concern    Not on file     Social History Narrative            Current Medications/Allergies     Current Outpatient Prescriptions   Medication Sig Dispense Refill    albuterol (PROVENTIL HFA, VENTOLIN HFA, PROAIR HFA) 90 mcg/actuation inhaler Take 2 Puffs by inhalation every four (4) hours as needed for Wheezing. 1 Inhaler 5    losartan (COZAAR) 100 mg tablet Take 1 Tab by mouth daily. 30 Tab 5    amLODIPine (NORVASC) 10 mg tablet Take 1 Tab by mouth nightly.  Indications: hypertension 30 Tab 5     No Known Allergies

## 2017-09-14 NOTE — MR AVS SNAPSHOT
Visit Information Date & Time Provider Department Dept. Phone Encounter #  
 9/14/2017  3:20 PM Charlee Thompson, Norman Bhatti [phone] [id number] Follow-up Instructions Return in about 2 weeks (around 9/28/2017). Upcoming Health Maintenance Date Due Hepatitis C Screening 1950 FOOT EXAM Q1 12/15/1960 MICROALBUMIN Q1 12/15/1960 EYE EXAM RETINAL OR DILATED Q1 12/15/1960 DTaP/Tdap/Td series (1 - Tdap) 12/15/1971 BREAST CANCER SCRN MAMMOGRAM 12/15/2000 ZOSTER VACCINE AGE 60> 10/15/2010 GLAUCOMA SCREENING Q2Y 12/15/2015 OSTEOPOROSIS SCREENING (DEXA) 12/15/2015 Pneumococcal 65+ Low/Medium Risk (1 of 2 - PCV13) 12/15/2015 MEDICARE YEARLY EXAM 12/15/2015 LIPID PANEL Q1 8/28/2016 HEMOGLOBIN A1C Q6M 4/23/2017 INFLUENZA AGE 9 TO ADULT 8/1/2017 COLONOSCOPY 8/3/2025 Allergies as of 9/14/2017  Review Complete On: 8/25/2017 By: Joi Sheppard LPN No Known Allergies Current Immunizations  Never Reviewed No immunizations on file. Not reviewed this visit You Were Diagnosed With   
  
 Codes Comments Essential hypertension     ICD-10-CM: I10 
ICD-9-CM: 401.9 Right hip pain     ICD-10-CM: M25.551 ICD-9-CM: 719.45 Groin strain, right, subsequent encounter     ICD-10-CM: S76.211D ICD-9-CM: V58.89 Piriformis syndrome of right side     ICD-10-CM: G57.01 
ICD-9-CM: 355.0 Vitals BP Pulse Temp Resp Height(growth percentile) Weight(growth percentile) (!) 189/112 82 99.4 °F (37.4 °C) (Oral) 16 5' 3\" (1.6 m) 150 lb (68 kg) BMI OB Status Smoking Status 26.57 kg/m2 Hysterectomy Former Smoker Vitals History BMI and BSA Data Body Mass Index Body Surface Area  
 26.57 kg/m 2 1.74 m 2 Preferred Pharmacy Pharmacy Name Phone [address] Your Updated Medication List  
  
   
This list is accurate as of: 9/14/17  4:18 PM.  Always use your most recent med list.  
  
  
  
  
 albuterol 90 mcg/actuation inhaler Commonly known as:  PROVENTIL HFA, VENTOLIN HFA, PROAIR HFA Take 2 Puffs by inhalation every four (4) hours as needed for Wheezing. amLODIPine 10 mg tablet Commonly known as:  Robin Teresa Take 1 Tab by mouth nightly. Indications: hypertension  
  
 losartan 100 mg tablet Commonly known as:  COZAAR Take 1 Tab by mouth daily. oxyCODONE-acetaminophen 5-325 mg per tablet Commonly known as:  PERCOCET Take 1-2 Tabs by mouth every eight (8) hours as needed for Pain for up to 14 days. Max Daily Amount: 6 Tabs. Prescriptions Printed Refills  
 oxyCODONE-acetaminophen (PERCOCET) 5-325 mg per tablet 0 Sig: Take 1-2 Tabs by mouth every eight (8) hours as needed for Pain for up to 14 days. Max Daily Amount: 6 Tabs. Class: Print Route: Oral  
  
Prescriptions Sent to Pharmacy Refills  
 losartan (COZAAR) 100 mg tablet 5 Sig: Take 1 Tab by mouth daily. Class: Normal  
 Pharmacy: [address] Ph #: [phone] Route: Oral  
 amLODIPine (NORVASC) 10 mg tablet 5 Sig: Take 1 Tab by mouth nightly. Indications: hypertension Class: Normal  
 Pharmacy: [address] Ph #: [phone] Route: Oral  
  
Follow-up Instructions Return in about 2 weeks (around 9/28/2017). Introducing [facility] & HEALTH SERVICES! [facility] introduces [employer] patient portal. Now you can access parts of your medical record, email your doctor's office, and request medication refills online. 1. In your internet browser, go to [email or web address] 2. Click on the First Time User? Click Here link in the Sign In box. You will see the New Member Sign Up page. 3. Enter your Manymoon Access Code exactly as it appears below. You will not need to use this code after youve completed the sign-up process. If you do not sign up before the expiration date, you must request a new code. · Manymoon Access Code: [email or web address] Expires: 10/8/2017  3:19 PM 
 
4. Enter the last four digits of your Social Security Number (xxxx) and Date of Birth (mm/dd/yyyy) as indicated and click Submit. You will be taken to the next sign-up page. 5. Create a Manymoon ID. This will be your Manymoon login ID and cannot be changed, so think of one that is secure and easy to remember. 6. Create a Manymoon password. You can change your password at any time. 7. Enter your Password Reset Question and Answer. This can be used at a later time if you forget your password. 8. Enter your e-mail address. You will receive e-mail notification when new information is available in [address]. 9. Click Sign Up. You can now view and download portions of your medical record. 10. Click the Download Summary menu link to download a portable copy of your medical information. If you have questions, please visit the Frequently Asked Questions section of the Manymoon website. Remember, Manymoon is NOT to be used for urgent needs. For medical emergencies, dial 911. Now available from your iPhone and Android! Please provide this summary of care documentation to your next provider. Your primary care clinician is listed as El Ervin. If you have any questions after today's visit, please call [phone].

## 2017-09-14 NOTE — PROGRESS NOTES
Chief Complaint   Patient presents with    Pelvic Pain     right hip     1. Have you been to the ER, urgent care clinic since your last visit? Hospitalized since your last visit? no    2. Have you seen or consulted any other health care providers outside of the [address] [place] [address] since your last visit? Include any pap smears or colon screening.   no    hasnt taken bp medicine in a week

## 2017-09-28 ENCOUNTER — OFFICE VISIT (OUTPATIENT)
Dept: FAMILY MEDICINE CLINIC | Age: 67
End: 2017-09-28

## 2017-09-28 VITALS
WEIGHT: 149 LBS | OXYGEN SATURATION: 95 % | BODY MASS INDEX: 26.4 KG/M2 | TEMPERATURE: 98.1 F | DIASTOLIC BLOOD PRESSURE: 74 MMHG | HEIGHT: 63 IN | SYSTOLIC BLOOD PRESSURE: 135 MMHG | HEART RATE: 95 BPM | RESPIRATION RATE: 16 BRPM

## 2017-09-28 DIAGNOSIS — E11.9 TYPE 2 DIABETES MELLITUS WITHOUT COMPLICATION, WITHOUT LONG-TERM CURRENT USE OF INSULIN (HCC): ICD-10-CM

## 2017-09-28 DIAGNOSIS — R53.83 FATIGUE, UNSPECIFIED TYPE: ICD-10-CM

## 2017-09-28 DIAGNOSIS — D50.9 IRON DEFICIENCY ANEMIA, UNSPECIFIED IRON DEFICIENCY ANEMIA TYPE: ICD-10-CM

## 2017-09-28 DIAGNOSIS — S32.591S PUBIC RAMUS FRACTURE, RIGHT, SEQUELA: Primary | ICD-10-CM

## 2017-09-28 LAB
ALBUMIN UR QL STRIP: 30 MG/L
CREATININE, URINE POC: 200 MG/DL
MICROALBUMIN/CREAT RATIO POC: <30 MG/G

## 2017-09-28 RX ORDER — OXYCODONE AND ACETAMINOPHEN 5; 325 MG/1; MG/1
1 TABLET ORAL
Qty: 60 TAB | Refills: 0 | Status: SHIPPED | OUTPATIENT
Start: 2017-09-28 | End: 2017-10-11 | Stop reason: SDUPTHER

## 2017-09-28 NOTE — PROGRESS NOTES
Randi Roy  77 y.o. female  1950  [address] [facility] [id number]  [address] [place] [address] Dr: Progress Note  Hailey Treadwell MD       Encounter Date: 9/28/2017    Chief Complaint   Patient presents with    Follow-up     fractured hip     History of Present Illness   Randi Roy is a 77 y.o. female who presents to clinic today for follow-up on her fractured pelvis. Still is having significant pain, especially after working, when she stands on her feet up to 4-6 hours at a time. She is taking her percocet, but notes these only provide temp relief. She has about 30 tablets left. She rarely uses her cane. Does note that overall she has had some improvement. Fatigue: Patient also noting significant fatigue, adarsh since hospitalization for PNA and perforated ulcer. Diabetes Mellitus:  She has diabetes mellitus, and  diabetes and hypertension. Diabetic ROS - diabetic diet compliance: probably noncompliant though I cannot elicit that specific history, home glucose monitoring: is not performed, further diabetic ROS: no polyuria or polydipsia, no chest pain, dyspnea or TIA's, no numbness, tingling or pain in extremities, no unusual visual symptoms. Lab review: orders written for new lab studies as appropriate; see orders. Review of Systems   Review of Systems   Gastrointestinal: Negative for abdominal pain and constipation. Genitourinary: Negative for dysuria, flank pain, hematuria and urgency. Musculoskeletal: Positive for joint pain. Vitals/Objective:     Vitals:    09/28/17 1333   BP: 135/74   Pulse: 95   Resp: 16   Temp: 98.1 °F (36.7 °C)   TempSrc: Oral   SpO2: 95%   Weight: 149 lb (67.6 kg)   Height: 5' 3\" (1.6 m)     Body mass index is 26.39 kg/(m^2). Physical Exam   Constitutional: She is oriented to person, place, and time. No distress. Cardiovascular: Normal rate, regular rhythm, normal heart sounds and intact distal pulses.   Exam reveals no gallop and no friction rub. No murmur heard. Pulmonary/Chest: Effort normal and breath sounds normal.   Musculoskeletal:        Right hip: She exhibits tenderness. Neurological: She is alert and oriented to person, place, and time. Psychiatric: She has a normal mood and affect. Vitals reviewed. Assessment and Plan:   1. Pubic ramus fracture, right, sequela  Will reduce the frequency of her percocet to q 8 hours. RTC in 2 weeks for follow-up. This is not anticipated to be long term pain medication and will continue to work to wean off opioids as pain improves. Continue to encourage use of cane/walker. 2. Iron deficiency anemia, unspecified iron deficiency anemia type  Last bloodwork in November noted anemia (Hgb 8.0). Will follow-up today. - CBC WITH AUTOMATED DIFF  - FERRITIN    3. Fatigue, unspecified type  DDx: anemia, hypothyroidism, metabolic disturbance  - METABOLIC PANEL, COMPREHENSIVE  - TSH RFX ON ABNORMAL TO FREE T4  - URINALYSIS W/ RFLX MICROSCOPIC    4. Type 2 diabetes mellitus without complication, without long-term current use of insulin ([facility])  Last HgbA1c 7.0. Recheck. Key Antihyperglycemic Medications     Patient is on no antihyperglycemic meds. - HEMOGLOBIN A1C WITH EAG  - LIPID PANEL  - AMB POC URINE, MICROALBUMIN, SEMIQUANT (3 RESULTS)  - URINALYSIS W/ RFLX MICROSCOPIC      I have discussed the diagnosis with the patient and the intended plan as seen in the above orders. she has expressed understanding. The patient has received an after-visit summary and questions were answered concerning future plans. I have discussed medication side effects and warnings with the patient as well. Follow-up Disposition:  Return in about 2 weeks (around 10/12/2017). Electronically Signed: Ann-Marie Strong MD     History   Patients past medical, surgical and family histories were reviewed and updated.     Past Medical History:   Diagnosis Date    Diverticulosis 9/23/2015    Hard of hearing     Hypertension     Iron deficiency anemia     baseline 9.7 on 8/2015    PUD (peptic ulcer disease)     hospitalized at [facility], EGD showed a nonbleeding esophageal ulcer, multiple gastric ulcers    Pulmonary nodule 8/2/2015    CT chest: 2.8 cm focal airspace disease RUL, pna vs mass, referred to pulm with PET    Type 2 diabetes mellitus without complication, without long-term current use of insulin ([address] 9/12/2015     Past Surgical History:   Procedure Laterality Date    HX MALIGNANT SKIN LESION EXCISION Right under  eye    HX PARTIAL HYSTERECTOMY Bilateral      Family History   Problem Relation Age of Onset    Diabetes Mother     Hypertension Mother     Alzheimer Mother     Pacemaker Mother     Heart Attack Father      Social History     Social History    Marital status:      Spouse name: N/A    Number of children: N/A    Years of education: N/A     Occupational History    Not on file. Social History Main Topics    Smoking status: Former Smoker     Packs/day: 0.25     Years: 30.00     Quit date: 10/16/2016    Smokeless tobacco: Never Used    Alcohol use 0.0 oz/week     0 Standard drinks or equivalent per week      Comment: very rare    Drug use: No    Sexual activity: Not on file     Other Topics Concern    Not on file     Social History Narrative            Current Medications/Allergies     Current Outpatient Prescriptions   Medication Sig Dispense Refill    oxyCODONE-acetaminophen (PERCOCET) 5-325 mg per tablet Take 1 Tab by mouth every eight (8) hours as needed for Pain for up to 14 days. Max Daily Amount: 3 Tabs. 60 Tab 0    losartan (COZAAR) 100 mg tablet Take 1 Tab by mouth daily. 30 Tab 5    amLODIPine (NORVASC) 10 mg tablet Take 1 Tab by mouth nightly.  Indications: hypertension 30 Tab 5    albuterol (PROVENTIL HFA, VENTOLIN HFA, PROAIR HFA) 90 mcg/actuation inhaler Take 2 Puffs by inhalation every four (4) hours as needed for Wheezing.  1 Inhaler 5     No Known Allergies

## 2017-09-28 NOTE — MR AVS SNAPSHOT
Visit Information Date & Time Provider Department Dept. Phone Encounter #  
 9/28/2017  2:00 PM Terrance Gutierrez, [facility] [phone] Follow-up Instructions Return in about 2 weeks (around 10/12/2017). Upcoming Health Maintenance Date Due Hepatitis C Screening 1950 FOOT EXAM Q1 12/15/1960 MICROALBUMIN Q1 12/15/1960 EYE EXAM RETINAL OR DILATED Q1 12/15/1960 DTaP/Tdap/Td series (1 - Tdap) 12/15/1971 BREAST CANCER SCRN MAMMOGRAM 12/15/2000 ZOSTER VACCINE AGE 60> 10/15/2010 GLAUCOMA SCREENING Q2Y 12/15/2015 OSTEOPOROSIS SCREENING (DEXA) 12/15/2015 Pneumococcal 65+ Low/Medium Risk (1 of 2 - PCV13) 12/15/2015 MEDICARE YEARLY EXAM 12/15/2015 LIPID PANEL Q1 8/28/2016 HEMOGLOBIN A1C Q6M 4/23/2017 INFLUENZA AGE 9 TO ADULT 8/1/2017 COLONOSCOPY 8/3/2025 Allergies as of 9/28/2017  Review Complete On: 8/25/2017 By: Daniella Diez LPN No Known Allergies Current Immunizations  Never Reviewed No immunizations on file. Not reviewed this visit You Were Diagnosed With   
  
 Codes Comments Pubic ramus fracture, right, sequela    -  Primary ICD-10-CM: S32.591S ICD-9-CM: 905.1 Iron deficiency anemia, unspecified iron deficiency anemia type     ICD-10-CM: D50.9 ICD-9-CM: 280.9 Fatigue, unspecified type     ICD-10-CM: R53.83 ICD-9-CM: 780.79 Type 2 diabetes mellitus without complication, without long-term current use of insulin (HCC)     ICD-10-CM: E11.9 ICD-9-CM: 250.00 Pubic ramus fracture, right, with routine healing, subsequent encounter     ICD-10-CM: S32.591D ICD-9-CM: V54.19 Vitals BP Pulse Temp Resp Height(growth percentile) Weight(growth percentile) 135/74 95 98.1 °F (36.7 °C) (Oral) 16 5' 3\" (1.6 m) 149 lb (67.6 kg) SpO2 BMI OB Status Smoking Status 95% 26.39 kg/m2 Hysterectomy Former Smoker Vitals History BMI and BSA Data Body Mass Index Body Surface Area  
 26.39 kg/m 2 1.73 m 2 Preferred Pharmacy Pharmacy Name Phone [facility] [place] [address] Your Updated Medication List  
  
   
This list is accurate as of: 9/28/17  2:17 PM.  Always use your most recent med list.  
  
  
  
  
 albuterol 90 mcg/actuation inhaler Commonly known as:  PROVENTIL HFA, VENTOLIN HFA, PROAIR HFA Take 2 Puffs by inhalation every four (4) hours as needed for Wheezing. amLODIPine 10 mg tablet Commonly known as:  Maryam Pace Take 1 Tab by mouth nightly. Indications: hypertension  
  
 losartan 100 mg tablet Commonly known as:  COZAAR Take 1 Tab by mouth daily. oxyCODONE-acetaminophen 5-325 mg per tablet Commonly known as:  PERCOCET Take 1 Tab by mouth every eight (8) hours as needed for Pain for up to 14 days. Max Daily Amount: 3 Tabs. Prescriptions Printed Refills  
 oxyCODONE-acetaminophen (PERCOCET) 5-325 mg per tablet 0 Sig: Take 1 Tab by mouth every eight (8) hours as needed for Pain for up to 14 days. Max Daily Amount: 3 Tabs. Class: Print Route: Oral  
  
We Performed the Following CBC WITH AUTOMATED DIFF [35384 CPT(R)] FERRITIN [11374 CPT(R)] HEMOGLOBIN A1C WITH EAG [90057 CPT(R)] LIPID PANEL [83957 CPT(R)] METABOLIC PANEL, COMPREHENSIVE [50318 CPT(R)] TSH RFX ON ABNORMAL TO FREE T4 [[id number] Custom] Follow-up Instructions Return in about 2 weeks (around 10/12/2017). Patient Instructions Iron Deficiency Anemia: Care Instructions Your Care Instructions Anemia means that you do not have enough red blood cells. Red blood cells carry oxygen around your body. When you have anemia, it can make you pale, weak, and tired. Many things can cause anemia. The most common cause is loss of blood.  This can happen if you have heavy menstrual periods. It can also happen if you have bleeding in your stomach or bowel. You can also get anemia if you don't have enough iron in your diet or if it's hard for your body to absorb iron. In some cases, pregnancy causes anemia. That's because a pregnant woman needs more iron. Your doctor may do more tests to find the cause of your anemia. If a disease or other health problem is causing it, your doctor will treat that problem. It's important to follow up with your doctor to make sure that your iron level returns to normal. 
Follow-up care is a key part of your treatment and safety. Be sure to make and go to all appointments, and call your doctor if you are having problems. It's also a good idea to know your test results and keep a list of the medicines you take. How can you care for yourself at home? · If your doctor recommended iron pills, take them as directed. ¨ Try to take the pills on an empty stomach. You can do this about 1 hour before or 2 hours after meals. But you may need to take iron with food to avoid an upset stomach. ¨ Do not take antacids or drink milk or anything with caffeine within 2 hours of when you take your iron. They can keep your body from absorbing the iron well. ¨ Vitamin C helps your body absorb iron. You may want to take iron pills with a glass of orange juice or some other food high in vitamin C. 
¨ Iron pills may cause stomach problems. These include heartburn, nausea, diarrhea, constipation, and cramps. It can help to drink plenty of fluids and include fruits, vegetables, and fiber in your diet. ¨ It's normal for iron pills to make your stool a greenish or grayish black. But internal bleeding can also cause dark stool. So it's important to tell your doctor about any color changes. ¨ Call your doctor if you think you are having a problem with your iron pills.  Even after you start to feel better, it will take several months for your body to build up its supply of iron. ¨ If you miss a pill, don't take a double dose. ¨ Keep iron pills out of the reach of small children. Too much iron can be very dangerous. · Eat foods with a lot of iron. These include red meat, shellfish, poultry, and eggs. They also include beans, raisins, whole-grain bread, and leafy green vegetables. · Steam your vegetables. This is the best way to prepare them if you want to get as much iron as possible. · Be safe with medicines. Do not take nonsteroidal anti-inflammatory pain relievers unless your doctor tells you to. These include aspirin, naproxen (Aleve), and ibuprofen (Advil, Motrin). · Liquid iron can stain your teeth. But you can mix it with water or juice and drink it with a straw. Then it won't get on your teeth. When should you call for help? Call 911 anytime you think you may need emergency care. For example, call if: 
· You passed out (lost consciousness). · You vomit blood or what looks like coffee grounds. · You pass maroon or very bloody stools. Call your doctor now or seek immediate medical care if: 
· Your stools are black and look like tar, or they have streaks of blood. · You are dizzy or lightheaded, or you feel like you may faint. Watch closely for changes in your health, and be sure to contact your doctor if: 
· Your fatigue and weakness continue or get worse. · You have side effects from taking iron pills, such as nausea, vomiting, constipation, diarrhea, or heartburn. · You do not get better as expected. Where can you learn more? Go to http://katerin-celina.info/. Enter E092 in the search box to learn more about \"Iron Deficiency Anemia: Care Instructions. \" Current as of: October 13, 2016 Content Version: 11.3 © 4504-6097 [employer].  Care instructions adapted under license by [employer] (which disclaims liability or warranty for this information). If you have questions about a medical condition or this instruction, always ask your healthcare professional. [address] any warranty or liability for your use of this information. Introducing [facility] SERVICES! Ashlee Betancur introduces [email or web address] patient portal. Now you can access parts of your medical record, email your doctor's office, and request medication refills online. 1. In your internet browser, go to [email or web address] 2. Click on the First Time User? Click Here link in the Sign In box. You will see the New Member Sign Up page. 3. Enter your [email or web address] Access Code exactly as it appears below. You will not need to use this code after youve completed the sign-up process. If you do not sign up before the expiration date, you must request a new code. · [email or web address] Access Code: [email or web address] Expires: 10/8/2017  3:19 PM 
 
4. Enter the last four digits of your Social Security Number (xxxx) and Date of Birth (mm/dd/yyyy) as indicated and click Submit. You will be taken to the next sign-up page. 5. Create a [email or web address] ID. This will be your [email or web address] login ID and cannot be changed, so think of one that is secure and easy to remember. 6. Create a [email or web address] password. You can change your password at any time. 7. Enter your Password Reset Question and Answer. This can be used at a later time if you forget your password. 8. Enter your e-mail address. You will receive e-mail notification when new information is available in [address] 9. Click Sign Up. You can now view and download portions of your medical record. 10. Click the Download Summary menu link to download a portable copy of your medical information. If you have questions, please visit the Frequently Asked Questions section of the [email or web address] website. Remember, [email or web address] is NOT to be used for urgent needs. For medical emergencies, dial 911. Now available from your iPhone and Android! Please provide this summary of care documentation to your next provider. Your primary care clinician is listed as Nancy Hill. If you have any questions after today's visit, please call [phone].

## 2017-09-28 NOTE — PROGRESS NOTES
Chief Complaint   Patient presents with    Follow-up     1. Have you been to the ER, urgent care clinic since your last visit? Hospitalized since your last visit? No    2. Have you seen or consulted any other health care providers outside of the [address] [place] [address] since your last visit? Include any pap smears or colon screening.   no      Eye exam done--normal     Mammogram- hasnt had one in about 15-20 years

## 2017-09-28 NOTE — PATIENT INSTRUCTIONS
Iron Deficiency Anemia: Care Instructions  Your Care Instructions    Anemia means that you do not have enough red blood cells. Red blood cells carry oxygen around your body. When you have anemia, it can make you pale, weak, and tired. Many things can cause anemia. The most common cause is loss of blood. This can happen if you have heavy menstrual periods. It can also happen if you have bleeding in your stomach or bowel. You can also get anemia if you don't have enough iron in your diet or if it's hard for your body to absorb iron. In some cases, pregnancy causes anemia. That's because a pregnant woman needs more iron. Your doctor may do more tests to find the cause of your anemia. If a disease or other health problem is causing it, your doctor will treat that problem. It's important to follow up with your doctor to make sure that your iron level returns to normal.  Follow-up care is a key part of your treatment and safety. Be sure to make and go to all appointments, and call your doctor if you are having problems. It's also a good idea to know your test results and keep a list of the medicines you take. How can you care for yourself at home? · If your doctor recommended iron pills, take them as directed. ¨ Try to take the pills on an empty stomach. You can do this about 1 hour before or 2 hours after meals. But you may need to take iron with food to avoid an upset stomach. ¨ Do not take antacids or drink milk or anything with caffeine within 2 hours of when you take your iron. They can keep your body from absorbing the iron well. ¨ Vitamin C helps your body absorb iron. You may want to take iron pills with a glass of orange juice or some other food high in vitamin C.  ¨ Iron pills may cause stomach problems. These include heartburn, nausea, diarrhea, constipation, and cramps. It can help to drink plenty of fluids and include fruits, vegetables, and fiber in your diet.   ¨ It's normal for iron pills to make your stool a greenish or grayish black. But internal bleeding can also cause dark stool. So it's important to tell your doctor about any color changes. ¨ Call your doctor if you think you are having a problem with your iron pills. Even after you start to feel better, it will take several months for your body to build up its supply of iron. ¨ If you miss a pill, don't take a double dose. ¨ Keep iron pills out of the reach of small children. Too much iron can be very dangerous. · Eat foods with a lot of iron. These include red meat, shellfish, poultry, and eggs. They also include beans, raisins, whole-grain bread, and leafy green vegetables. · Steam your vegetables. This is the best way to prepare them if you want to get as much iron as possible. · Be safe with medicines. Do not take nonsteroidal anti-inflammatory pain relievers unless your doctor tells you to. These include aspirin, naproxen (Aleve), and ibuprofen (Advil, Motrin). · Liquid iron can stain your teeth. But you can mix it with water or juice and drink it with a straw. Then it won't get on your teeth. When should you call for help? Call 911 anytime you think you may need emergency care. For example, call if:  · You passed out (lost consciousness). · You vomit blood or what looks like coffee grounds. · You pass maroon or very bloody stools. Call your doctor now or seek immediate medical care if:  · Your stools are black and look like tar, or they have streaks of blood. · You are dizzy or lightheaded, or you feel like you may faint. Watch closely for changes in your health, and be sure to contact your doctor if:  · Your fatigue and weakness continue or get worse. · You have side effects from taking iron pills, such as nausea, vomiting, constipation, diarrhea, or heartburn. · You do not get better as expected. Where can you learn more? Go to http://kelechi.info/.   Enter M880 in the search box to learn more about \"Iron Deficiency Anemia: Care Instructions. \"  Current as of: October 13, 2016  Content Version: 11.3  © 0725-8118 [employer]. Care instructions adapted under license by [employer] (which disclaims liability or warranty for this information). If you have questions about a medical condition or this instruction, always ask your healthcare professional. [address] any warranty or liability for your use of this information.

## 2017-09-29 LAB
ALBUMIN SERPL-MCNC: 4.7 G/DL (ref 3.6–4.8)
ALBUMIN/GLOB SERPL: 2 {RATIO} (ref 1.2–2.2)
ALP SERPL-CCNC: 129 IU/L (ref 39–117)
ALT SERPL-CCNC: 23 IU/L (ref 0–32)
APPEARANCE UR: ABNORMAL
AST SERPL-CCNC: 20 IU/L (ref 0–40)
BASOPHILS # BLD AUTO: 0 X10E3/UL (ref 0–0.2)
BASOPHILS NFR BLD AUTO: 0 %
BILIRUB SERPL-MCNC: 0.4 MG/DL (ref 0–1.2)
BILIRUB UR QL STRIP: NEGATIVE
BUN SERPL-MCNC: 11 MG/DL (ref 8–27)
BUN/CREAT SERPL: 11 (ref 12–28)
CALCIUM SERPL-MCNC: 9.8 MG/DL (ref 8.7–10.3)
CHLORIDE SERPL-SCNC: 97 MMOL/L (ref 96–106)
CHOLEST SERPL-MCNC: 193 MG/DL (ref 100–199)
CO2 SERPL-SCNC: 29 MMOL/L (ref 18–29)
COLOR UR: YELLOW
CREAT SERPL-MCNC: 1.01 MG/DL (ref 0.57–1)
EOSINOPHIL # BLD AUTO: 0.1 X10E3/UL (ref 0–0.4)
EOSINOPHIL NFR BLD AUTO: 1 %
ERYTHROCYTE [DISTWIDTH] IN BLOOD BY AUTOMATED COUNT: 14.4 % (ref 12.3–15.4)
EST. AVERAGE GLUCOSE BLD GHB EST-MCNC: 137 MG/DL
FERRITIN SERPL-MCNC: 53 NG/ML (ref 15–150)
GLOBULIN SER CALC-MCNC: 2.3 G/DL (ref 1.5–4.5)
GLUCOSE SERPL-MCNC: 122 MG/DL (ref 65–99)
GLUCOSE UR QL: NEGATIVE
HBA1C MFR BLD: 6.4 % (ref 4.8–5.6)
HCT VFR BLD AUTO: 37.9 % (ref 34–46.6)
HDLC SERPL-MCNC: 42 MG/DL
HGB BLD-MCNC: 12.1 G/DL (ref 11.1–15.9)
HGB UR QL STRIP: NEGATIVE
IMM GRANULOCYTES # BLD: 0 X10E3/UL (ref 0–0.1)
IMM GRANULOCYTES NFR BLD: 0 %
INTERPRETATION, 910389: NORMAL
INTERPRETATION: NORMAL
KETONES UR QL STRIP: NEGATIVE
LDLC SERPL CALC-MCNC: 79 MG/DL (ref 0–99)
LEUKOCYTE ESTERASE UR QL STRIP: NEGATIVE
LYMPHOCYTES # BLD AUTO: 2.6 X10E3/UL (ref 0.7–3.1)
LYMPHOCYTES NFR BLD AUTO: 29 %
Lab: NORMAL
MCH RBC QN AUTO: 28.3 PG (ref 26.6–33)
MCHC RBC AUTO-ENTMCNC: 31.9 G/DL (ref 31.5–35.7)
MCV RBC AUTO: 89 FL (ref 79–97)
MICRO URNS: ABNORMAL
MONOCYTES # BLD AUTO: 0.5 X10E3/UL (ref 0.1–0.9)
MONOCYTES NFR BLD AUTO: 6 %
NEUTROPHILS # BLD AUTO: 5.7 X10E3/UL (ref 1.4–7)
NEUTROPHILS NFR BLD AUTO: 64 %
NITRITE UR QL STRIP: NEGATIVE
PDF IMAGE, 910387: NORMAL
PH UR STRIP: 5 [PH] (ref 5–7.5)
PLATELET # BLD AUTO: 323 X10E3/UL (ref 150–379)
POTASSIUM SERPL-SCNC: 3.6 MMOL/L (ref 3.5–5.2)
PROT SERPL-MCNC: 7 G/DL (ref 6–8.5)
PROT UR QL STRIP: ABNORMAL
RBC # BLD AUTO: 4.28 X10E6/UL (ref 3.77–5.28)
SODIUM SERPL-SCNC: 142 MMOL/L (ref 134–144)
SP GR UR: 1.02 (ref 1–1.03)
TRIGL SERPL-MCNC: 358 MG/DL (ref 0–149)
TSH SERPL DL<=0.005 MIU/L-ACNC: 3.21 UIU/ML (ref 0.45–4.5)
UROBILINOGEN UR STRIP-MCNC: 0.2 MG/DL (ref 0.2–1)
VLDLC SERPL CALC-MCNC: 72 MG/DL (ref 5–40)
WBC # BLD AUTO: 8.9 X10E3/UL (ref 3.4–10.8)

## 2017-10-11 ENCOUNTER — OFFICE VISIT (OUTPATIENT)
Dept: FAMILY MEDICINE CLINIC | Age: 67
End: 2017-10-11

## 2017-10-11 VITALS
BODY MASS INDEX: 26.36 KG/M2 | HEART RATE: 83 BPM | OXYGEN SATURATION: 98 % | WEIGHT: 148.8 LBS | SYSTOLIC BLOOD PRESSURE: 155 MMHG | HEIGHT: 63 IN | DIASTOLIC BLOOD PRESSURE: 81 MMHG | RESPIRATION RATE: 20 BRPM | TEMPERATURE: 99 F

## 2017-10-11 DIAGNOSIS — S32.591D PUBIC RAMUS FRACTURE, RIGHT, WITH ROUTINE HEALING, SUBSEQUENT ENCOUNTER: Primary | ICD-10-CM

## 2017-10-11 RX ORDER — OXYCODONE AND ACETAMINOPHEN 5; 325 MG/1; MG/1
1 TABLET ORAL
Qty: 45 TAB | Refills: 0 | Status: SHIPPED | OUTPATIENT
Start: 2017-10-11 | End: 2018-02-22 | Stop reason: SDUPTHER

## 2017-10-11 NOTE — PATIENT INSTRUCTIONS
Broken Pelvis: Care Instructions  Your Care Instructions    The pelvis is the ring of bones between your hips. It connects to the spine and to the leg bones at the hip joints. Blood vessels, nerves, and muscles run through the pelvic ring and can be affected by a break. A broken pelvis also can affect the organs in your pelvic area. A broken pelvis may need a few months to heal. You may have had surgery to repair your pelvis, depending on where it was broken and how bad the break was. Your doctor may have put metal screws, pins, or a irma in your pelvis to fix the break. In some cases, surgery is not needed. While your pelvis heals, you will need to keep weight off the hips. Once you are able to walk, a walker or crutches can help you get around. You can help your pelvis heal with care at home. Your doctor may prescribe medicine to relieve pain and prevent blood clots. You heal best when you take good care of yourself. Eat a variety of healthy foods, and don't smoke. Follow-up care is a key part of your treatment and safety. Be sure to make and go to all appointments, and call your doctor if you are having problems. It's also a good idea to know your test results and keep a list of the medicines you take. How can you care for yourself at home? · Put ice or a cold pack on the painful area for 10 to 20 minutes at a time. Try to do this every 1 to 2 hours for the next 3 days (when you are awake). Put a thin cloth between the ice and your skin. · Be safe with medicines. Take pain medicines exactly as directed. ¨ If the doctor gave you a prescription medicine for pain, take it as prescribed. ¨ If you are not taking a prescription pain medicine, ask your doctor if you can take an over-the-counter medicine. · Put only as much weight on each leg as your doctor tells you to. He or she may advise you to use crutches, a walker, or a cane to help you walk. · Avoid constipation.   ¨ Include fruits, vegetables, beans, and whole grains in your diet each day. These foods are high in fiber. ¨ Drink plenty of fluids, enough so that your urine is light yellow or clear like water. If you have kidney, heart, or liver disease and have to limit fluids, talk with your doctor before you increase the amount of fluids you drink. ¨ Get some exercise every day, once you are able to walk and your doctor tells you it is okay to exercise. Build up slowly to 30 to 60 minutes a day on 5 or more days of the week. ¨ Take a fiber supplement, such as Citrucel or Metamucil, every day if needed. Read and follow all instructions on the label. ¨ Schedule time each day for a bowel movement. Having a daily routine may help. Take your time and do not strain when having a bowel movement. When should you call for help? Call 911 anytime you think you may need emergency care. For example, call if:  · You have symptoms of a blood clot in your lung (called a pulmonary embolism). These may include:  ¨ Sudden chest pain. ¨ Trouble breathing. ¨ Coughing up blood. Call your doctor now or seek immediate medical care if:  · You have increased or severe pain. · Your leg or foot is cool, pale, or changes color. · You have tingling, weakness, or numbness in your foot and toes. · You cannot move your toes. · You have signs of a blood clot, such as:  ¨ Pain in your calf, back of the knee, thigh, or groin. ¨ Redness and swelling in your leg or groin. · You have a nosebleed. · Your gums bleed when you brush your teeth. · You have blood in your urine. · You have trouble urinating. Watch closely for changes in your health, and be sure to contact your doctor if:  · You do not get better as expected. Where can you learn more? Go to http://katerin-celina.info/. Enter D365 in the search box to learn more about \"Broken Pelvis: Care Instructions. \"  Current as of: March 21, 2017  Content Version: 11.3  © 8755-3442 [employer]. Care instructions adapted under license by [employer] (which disclaims liability or warranty for this information). If you have questions about a medical condition or this instruction, always ask your healthcare professional. [address] any warranty or liability for your use of this information. Trochanteric Bursitis: Exercises  Your Care Instructions  Here are some examples of typical rehabilitation exercises for your condition. Start each exercise slowly. Ease off the exercise if you start to have pain. Your doctor or physical therapist will tell you when you can start these exercises and which ones will work best for you. How to do the exercises  Hamstring wall stretch    1. Lie on your back in a doorway, with your good leg through the open door. 2. Slide your affected leg up the wall to straighten your knee. You should feel a gentle stretch down the back of your leg. ¨ Do not arch your back. ¨ Do not bend either knee. ¨ Keep one heel touching the floor and the other heel touching the wall. Do not point your toes. 3. Hold the stretch for at least 1 minute to begin. Then try to lengthen the time you hold the stretch to as long as 6 minutes. 4. Repeat 2 to 4 times. If you do not have a place to do this exercise in a doorway, there is another way to do it:  1. Lie on your back, and bend the knee of your affected leg. 2. Loop a towel under the ball and toes of that foot, and hold the ends of the towel in your hands. 3. Straighten your knee, and slowly pull back on the towel. You should feel a gentle stretch down the back of your leg. 4. Hold the stretch for 15 to 30 seconds. Or even better, hold the stretch for 1 minute if you can. 5. Repeat 2 to 4 times. Straight-leg raises to the outside    1. Lie on your side, with your affected leg on top. 2. Tighten the front thigh muscles of your top leg to keep your knee straight.   3. Keep your hip and your leg straight in line with the rest of your body, and keep your knee pointing forward. Do not drop your hip back. 4. Lift your top leg straight up toward the ceiling, about 12 inches off the floor. Hold for about 6 seconds, then slowly lower your leg. 5. Repeat 8 to 12 times. Clamshell    1. Lie on your side, with your affected leg on top and your head propped on a pillow. Keep your feet and knees together and your knees bent. 2. Raise your top knee, but keep your feet together. Do not let your hips roll back. Your legs should open up like a clamshell. 3. Hold for 6 seconds. 4. Slowly lower your knee back down. Rest for 10 seconds. 5. Repeat 8 to 12 times. Standing quadriceps stretch    1. If you are not steady on your feet, hold on to a chair, counter, or wall. You can also lie on your stomach or your side to do this exercise. 2. Bend the knee of the leg you want to stretch, and reach behind you to grab the front of your foot or ankle with the hand on the same side. For example, if you are stretching your right leg, use your right hand. 3. Keeping your knees next to each other, pull your foot toward your buttock until you feel a gentle stretch across the front of your hip and down the front of your thigh. Your knee should be pointed directly to the ground, and not out to the side. 4. Hold the stretch for 15 to 30 seconds. 5. Repeat 2 to 4 times. Piriformis stretch    1. Lie on your back with your legs straight. 2. Lift your affected leg and bend your knee. With your opposite hand, reach across your body, and then gently pull your knee toward your opposite shoulder. 3. Hold the stretch for 15 to 30 seconds. 4. Repeat 2 to 4 times. Double knee-to-chest    1. Lie on your back with your knees bent and your feet flat on the floor. You can put a small pillow under your head and neck if it is more comfortable. 2. Bring both knees to your chest.  3. Keep your lower back pressed to the floor.  Hold for 15 to 30 seconds. 4. Relax, and lower your knees to the starting position. 5. Repeat 2 to 4 times. Follow-up care is a key part of your treatment and safety. Be sure to make and go to all appointments, and call your doctor if you are having problems. It's also a good idea to know your test results and keep a list of the medicines you take. Where can you learn more? Go to http://katerin-celina.info/. Enter F729 in the search box to learn more about \"Trochanteric Bursitis: Exercises. \"  Current as of: March 21, 2017  Content Version: 11.3  © 8000-5384 [employer]. Care instructions adapted under license by [employer] (which disclaims liability or warranty for this information). If you have questions about a medical condition or this instruction, always ask your healthcare professional. [address] any warranty or liability for your use of this information.

## 2017-10-11 NOTE — PROGRESS NOTES
Identified pt with two pt identifiers(name and ). Reviewed record in preparation for visit and have obtained necessary documentation. Chief Complaint   Patient presents with    Hip Injury     follow up    Leg Pain     right leg hurts more every day while walking        Health Maintenance Due   Topic    Hepatitis C Screening     FOOT EXAM Q1     EYE EXAM RETINAL OR DILATED Q1     DTaP/Tdap/Td series (1 - Tdap)    BREAST CANCER SCRN MAMMOGRAM     ZOSTER VACCINE AGE 60>     GLAUCOMA SCREENING Q2Y     OSTEOPOROSIS SCREENING (DEXA)     Pneumococcal 65+ Low/Medium Risk (1 of 2 - PCV13)    MEDICARE YEARLY EXAM     INFLUENZA AGE 9 TO ADULT        Coordination of Care Questionnaire:  :   1) Have you been to an emergency room, urgent care clinic since your last visit? no   Hospitalized since your last visit? no             2. Have seen or consulted any other health care provider since your last visit? NO  If yes, where when, and reason for visit? 3) Do you have an Advanced Directive/ Living Will in place? NO  If yes, do we have a copy on file NO  If no, would you like information NO    Patient is accompanied by self I have received verbal consent from Oliver Lord to discuss any/all medical information while they are present in the room.

## 2017-10-11 NOTE — MR AVS SNAPSHOT
Visit Information Date & Time Provider Department Dept. Phone Encounter #  
 10/11/2017  3:00 PM Vickie Prajapati, Norman Bhatti [phone] [id number] Follow-up Instructions Return in about 2 weeks (around 10/25/2017). Upcoming Health Maintenance Date Due Hepatitis C Screening 1950 FOOT EXAM Q1 12/15/1960 EYE EXAM RETINAL OR DILATED Q1 12/15/1960 DTaP/Tdap/Td series (1 - Tdap) 12/15/1971 BREAST CANCER SCRN MAMMOGRAM 12/15/2000 ZOSTER VACCINE AGE 60> 10/15/2010 GLAUCOMA SCREENING Q2Y 12/15/2015 OSTEOPOROSIS SCREENING (DEXA) 12/15/2015 Pneumococcal 65+ Low/Medium Risk (1 of 2 - PCV13) 12/15/2015 MEDICARE YEARLY EXAM 12/15/2015 INFLUENZA AGE 9 TO ADULT 8/1/2017 HEMOGLOBIN A1C Q6M 3/28/2018 MICROALBUMIN Q1 9/28/2018 LIPID PANEL Q1 9/28/2018 COLONOSCOPY 8/3/2025 Allergies as of 10/11/2017  Review Complete On: 81/33/1822 By: Freddy Yuen RN No Known Allergies Current Immunizations  Never Reviewed No immunizations on file. Not reviewed this visit You Were Diagnosed With   
  
 Codes Comments Pubic ramus fracture, right, with routine healing, subsequent encounter    -  Primary ICD-10-CM: S32.591D ICD-9-CM: V54.19 Vitals BP Pulse Temp Resp Height(growth percentile) Weight(growth percentile) 155/81 (BP 1 Location: Right arm, BP Patient Position: Sitting) 83 99 °F (37.2 °C) 20 5' 3\" (1.6 m) 148 lb 12.8 oz (67.5 kg) SpO2 BMI OB Status Smoking Status 98% 26.36 kg/m2 Hysterectomy Former Smoker Vitals History BMI and BSA Data Body Mass Index Body Surface Area  
 26.36 kg/m 2 1.73 m 2 Preferred Pharmacy Pharmacy Name Phone [address] Your Updated Medication List  
  
   
This list is accurate as of: 10/11/17  3:57 PM.  Always use your most recent med list.  
  
  
  
  
 albuterol 90 mcg/actuation inhaler Commonly known as:  PROVENTIL HFA, VENTOLIN HFA, PROAIR HFA Take 2 Puffs by inhalation every four (4) hours as needed for Wheezing. amLODIPine 10 mg tablet Commonly known as:  Jim Loaizaer Take 1 Tab by mouth nightly. Indications: hypertension  
  
 losartan 100 mg tablet Commonly known as:  COZAAR Take 1 Tab by mouth daily. oxyCODONE-acetaminophen 5-325 mg per tablet Commonly known as:  PERCOCET Take 1 Tab by mouth every eight (8) hours as needed for Pain for up to 14 days. Max Daily Amount: 3 Tabs. Prescriptions Printed Refills  
 oxyCODONE-acetaminophen (PERCOCET) 5-325 mg per tablet 0 Sig: Take 1 Tab by mouth every eight (8) hours as needed for Pain for up to 14 days. Max Daily Amount: 3 Tabs. Class: Print Route: Oral  
  
Follow-up Instructions Return in about 2 weeks (around 10/25/2017). To-Do List   
 As directed Imaging:  XR PELV AP ONLY Patient Instructions Broken Pelvis: Care Instructions Your Care Instructions The pelvis is the ring of bones between your hips. It connects to the spine and to the leg bones at the hip joints. Blood vessels, nerves, and muscles run through the pelvic ring and can be affected by a break. A broken pelvis also can affect the organs in your pelvic area. A broken pelvis may need a few months to heal. You may have had surgery to repair your pelvis, depending on where it was broken and how bad the break was. Your doctor may have put metal screws, pins, or a irma in your pelvis to fix the break. In some cases, surgery is not needed. While your pelvis heals, you will need to keep weight off the hips. Once you are able to walk, a walker or crutches can help you get around. You can help your pelvis heal with care at home. Your doctor may prescribe medicine to relieve pain and prevent blood clots. You heal best when you take good care of yourself. Eat a variety of healthy foods, and don't smoke. Follow-up care is a key part of your treatment and safety. Be sure to make and go to all appointments, and call your doctor if you are having problems. It's also a good idea to know your test results and keep a list of the medicines you take. How can you care for yourself at home? · Put ice or a cold pack on the painful area for 10 to 20 minutes at a time. Try to do this every 1 to 2 hours for the next 3 days (when you are awake). Put a thin cloth between the ice and your skin. · Be safe with medicines. Take pain medicines exactly as directed. ¨ If the doctor gave you a prescription medicine for pain, take it as prescribed. ¨ If you are not taking a prescription pain medicine, ask your doctor if you can take an over-the-counter medicine. · Put only as much weight on each leg as your doctor tells you to. He or she may advise you to use crutches, a walker, or a cane to help you walk. · Avoid constipation. ¨ Include fruits, vegetables, beans, and whole grains in your diet each day. These foods are high in fiber. ¨ Drink plenty of fluids, enough so that your urine is light yellow or clear like water. If you have kidney, heart, or liver disease and have to limit fluids, talk with your doctor before you increase the amount of fluids you drink. ¨ Get some exercise every day, once you are able to walk and your doctor tells you it is okay to exercise. Build up slowly to 30 to 60 minutes a day on 5 or more days of the week. ¨ Take a fiber supplement, such as Citrucel or Metamucil, every day if needed. Read and follow all instructions on the label. ¨ Schedule time each day for a bowel movement. Having a daily routine may help. Take your time and do not strain when having a bowel movement. When should you call for help? Call 911 anytime you think you may need emergency care. For example, call if: · You have symptoms of a blood clot in your lung (called a pulmonary embolism). These may include: 
¨ Sudden chest pain. ¨ Trouble breathing. ¨ Coughing up blood. Call your doctor now or seek immediate medical care if: 
· You have increased or severe pain. · Your leg or foot is cool, pale, or changes color. · You have tingling, weakness, or numbness in your foot and toes. · You cannot move your toes. · You have signs of a blood clot, such as: 
¨ Pain in your calf, back of the knee, thigh, or groin. ¨ Redness and swelling in your leg or groin. · You have a nosebleed. · Your gums bleed when you brush your teeth. · You have blood in your urine. · You have trouble urinating. Watch closely for changes in your health, and be sure to contact your doctor if: 
· You do not get better as expected. Where can you learn more? Go to http://katerin-celina.info/. Enter U736 in the search box to learn more about \"Broken Pelvis: Care Instructions. \" Current as of: March 21, 2017 Content Version: 11.3 © 9107-6562 [employer]. Care instructions adapted under license by [employer] (which disclaims liability or warranty for this information). If you have questions about a medical condition or this instruction, always ask your healthcare professional. [place] [address] any warranty or liability for your use of this information. Trochanteric Bursitis: Exercises Your Care Instructions Here are some examples of typical rehabilitation exercises for your condition. Start each exercise slowly. Ease off the exercise if you start to have pain. Your doctor or physical therapist will tell you when you can start these exercises and which ones will work best for you. How to do the exercises Hamstring wall stretch 1. Lie on your back in a doorway, with your good leg through the open door. 2. Slide your affected leg up the wall to straighten your knee. You should feel a gentle stretch down the back of your leg. ¨ Do not arch your back. ¨ Do not bend either knee. ¨ Keep one heel touching the floor and the other heel touching the wall. Do not point your toes. 3. Hold the stretch for at least 1 minute to begin. Then try to lengthen the time you hold the stretch to as long as 6 minutes. 4. Repeat 2 to 4 times. If you do not have a place to do this exercise in a doorway, there is another way to do it: 1. Lie on your back, and bend the knee of your affected leg. 2. Loop a towel under the ball and toes of that foot, and hold the ends of the towel in your hands. 3. Straighten your knee, and slowly pull back on the towel. You should feel a gentle stretch down the back of your leg. 4. Hold the stretch for 15 to 30 seconds. Or even better, hold the stretch for 1 minute if you can. 5. Repeat 2 to 4 times. Straight-leg raises to the outside 1. Lie on your side, with your affected leg on top. 2. Tighten the front thigh muscles of your top leg to keep your knee straight. 3. Keep your hip and your leg straight in line with the rest of your body, and keep your knee pointing forward. Do not drop your hip back. 4. Lift your top leg straight up toward the ceiling, about 12 inches off the floor. Hold for about 6 seconds, then slowly lower your leg. 5. Repeat 8 to 12 times. Clamshell 1. Lie on your side, with your affected leg on top and your head propped on a pillow. Keep your feet and knees together and your knees bent. 2. Raise your top knee, but keep your feet together. Do not let your hips roll back. Your legs should open up like a clamshell. 3. Hold for 6 seconds. 4. Slowly lower your knee back down. Rest for 10 seconds. 5. Repeat 8 to 12 times. Standing quadriceps stretch 1.  If you are not steady on your feet, hold on to a chair, counter, or wall. You can also lie on your stomach or your side to do this exercise. 2. Bend the knee of the leg you want to stretch, and reach behind you to grab the front of your foot or ankle with the hand on the same side. For example, if you are stretching your right leg, use your right hand. 3. Keeping your knees next to each other, pull your foot toward your buttock until you feel a gentle stretch across the front of your hip and down the front of your thigh. Your knee should be pointed directly to the ground, and not out to the side. 4. Hold the stretch for 15 to 30 seconds. 5. Repeat 2 to 4 times. Piriformis stretch 1. Lie on your back with your legs straight. 2. Lift your affected leg and bend your knee. With your opposite hand, reach across your body, and then gently pull your knee toward your opposite shoulder. 3. Hold the stretch for 15 to 30 seconds. 4. Repeat 2 to 4 times. Double knee-to-chest 
 
1. Lie on your back with your knees bent and your feet flat on the floor. You can put a small pillow under your head and neck if it is more comfortable. 2. Bring both knees to your chest. 
3. Keep your lower back pressed to the floor. Hold for 15 to 30 seconds. 4. Relax, and lower your knees to the starting position. 5. Repeat 2 to 4 times. Follow-up care is a key part of your treatment and safety. Be sure to make and go to all appointments, and call your doctor if you are having problems. It's also a good idea to know your test results and keep a list of the medicines you take. Where can you learn more? Go to http://katerin-celina.info/. Enter C452 in the search box to learn more about \"Trochanteric Bursitis: Exercises. \" Current as of: March 21, 2017 Content Version: 11.3 © 4685-1516 [employer].  Care instructions adapted under license by [employer] (which disclaims liability or warranty for this information). If you have questions about a medical condition or this instruction, always ask your healthcare professional. [address] any warranty or liability for your use of this information. Introducing [facility]! Yasmine Hernandes introduces Vaultive patient portal. Now you can access parts of your medical record, email your doctor's office, and request medication refills online. 1. In your internet browser, go to [email or web address] 2. Click on the First Time User? Click Here link in the Sign In box. You will see the New Member Sign Up page. 3. Enter your HipClubt Access Code exactly as it appears below. You will not need to use this code after youve completed the sign-up process. If you do not sign up before the expiration date, you must request a new code. · Vaultive Access Code: [facility] Expires: 1/9/2018  3:57 PM 
 
4. Enter the last four digits of your Social Security Number (xxxx) and Date of Birth (mm/dd/yyyy) as indicated and click Submit. You will be taken to the next sign-up page. 5. Create a Vaultive ID. This will be your Vaultive login ID and cannot be changed, so think of one that is secure and easy to remember. 6. Create a Vaultive password. You can change your password at any time. 7. Enter your Password Reset Question and Answer. This can be used at a later time if you forget your password. 8. Enter your e-mail address. You will receive e-mail notification when new information is available in [address]. 9. Click Sign Up. You can now view and download portions of your medical record. 10. Click the Download Summary menu link to download a portable copy of your medical information. If you have questions, please visit the Frequently Asked Questions section of the Vaultive website. Remember, Vaultive is NOT to be used for urgent needs. For medical emergencies, dial 911. Now available from your iPhone and Android! Please provide this summary of care documentation to your next provider. Your primary care clinician is listed as Myah Macario. If you have any questions after today's visit, please call [phone].

## 2017-10-11 NOTE — PROGRESS NOTES
Idalia Jiang  77 y.o. female  1950  [address] [facility] [id number]  [address] [place] [address] Dr: Progress Note  Miguel Angel Frances MD       Encounter Date: 10/11/2017    Chief Complaint   Patient presents with    Hip Injury     follow up    Leg Pain     right leg hurts more every day while walking     History of Present Illness   Idalia Jiang is a 77 y.o. female who presents to clinic today for follow-up on pain from a pelvic fracture. Has approx 20 tabs of percocet left over at todays visit. Still requires significant dosing (2-3 tablets) surrounding her work schedule. She is not routinely using a walker or cane. Currently rates pain at a 5/10. Review of Systems   Review of Systems   Gastrointestinal: Negative for abdominal pain, blood in stool, constipation, diarrhea and melena. Musculoskeletal: Negative for falls. Pelvic pain   All other systems reviewed and are negative. Vitals/Objective:     Vitals:    10/11/17 1511   BP: 155/81   Pulse: 83   Resp: 20   Temp: 99 °F (37.2 °C)   SpO2: 98%   Weight: 148 lb 12.8 oz (67.5 kg)   Height: 5' 3\" (1.6 m)     Body mass index is 26.36 kg/(m^2). Physical Exam   Constitutional: She is oriented to person, place, and time. No distress. Cardiovascular: Normal rate, regular rhythm, normal heart sounds and intact distal pulses. Exam reveals no gallop and no friction rub. No murmur heard. Pulmonary/Chest: Effort normal and breath sounds normal.   Musculoskeletal:        Right hip: She exhibits tenderness. Neurological: She is alert and oriented to person, place, and time. Psychiatric: She has a normal mood and affect. Vitals reviewed. I personally reviewed the pelvic x-ray. There is evidence of a healing pubic ramus fracture with calcium callus formation at fracture sites. Assessment and Plan:   1.  Pubic ramus fracture, right, with routine healing, subsequent encounter  Healing pubic ramus fracture. Continue to wean percocet to 1 tab TID. Consider reduction to BID at next appointment. - XR PELV AP ONLY; Future  - oxyCODONE-acetaminophen (PERCOCET) 5-325 mg per tablet; Take 1 Tab by mouth every eight (8) hours as needed for Pain for up to 14 days. Max Daily Amount: 3 Tabs. Dispense: 45 Tab; Refill: 0    I have discussed the diagnosis with the patient and the intended plan as seen in the above orders. she has expressed understanding. The patient has received an after-visit summary and questions were answered concerning future plans. I have discussed medication side effects and warnings with the patient as well. Follow-up Disposition:  Return in about 2 weeks (around 10/25/2017). Electronically Signed: Jah Perry MD     History   Patients past medical, surgical and family histories were reviewed and updated. Past Medical History:   Diagnosis Date    Hard of hearing     Hypertension     Iron deficiency anemia     baseline 9.7 on 8/2015    PUD (peptic ulcer disease)     hospitalized at [facility], EGD showed a nonbleeding esophageal ulcer, multiple gastric ulcers    Pulmonary nodule 8/2/2015    CT chest: 2.8 cm focal airspace disease RUL, pna vs mass, referred to pulm with PET     Past Surgical History:   Procedure Laterality Date    HX MALIGNANT SKIN LESION EXCISION Right under  eye    HX PARTIAL HYSTERECTOMY Bilateral      Family History   Problem Relation Age of Onset    Diabetes Mother     Hypertension Mother     Alzheimer Mother     Pacemaker Mother     Heart Attack Father      Social History     Social History    Marital status:      Spouse name: N/A    Number of children: N/A    Years of education: N/A     Occupational History    Not on file.      Social History Main Topics    Smoking status: Former Smoker     Packs/day: 0.25     Years: 30.00     Quit date: 10/16/2016    Smokeless tobacco: Never Used    Alcohol use 0.0 oz/week     0 Standard drinks or equivalent per week      Comment: very rare    Drug use: No    Sexual activity: Not on file     Other Topics Concern    Not on file     Social History Narrative            Current Medications/Allergies     Current Outpatient Prescriptions   Medication Sig Dispense Refill    oxyCODONE-acetaminophen (PERCOCET) 5-325 mg per tablet Take 1 Tab by mouth every eight (8) hours as needed for Pain for up to 14 days. Max Daily Amount: 3 Tabs. 60 Tab 0    losartan (COZAAR) 100 mg tablet Take 1 Tab by mouth daily. 30 Tab 5    amLODIPine (NORVASC) 10 mg tablet Take 1 Tab by mouth nightly. Indications: hypertension 30 Tab 5    albuterol (PROVENTIL HFA, VENTOLIN HFA, PROAIR HFA) 90 mcg/actuation inhaler Take 2 Puffs by inhalation every four (4) hours as needed for Wheezing.  1 Inhaler 5     No Known Allergies

## 2017-10-30 ENCOUNTER — OFFICE VISIT (OUTPATIENT)
Dept: FAMILY MEDICINE CLINIC | Age: 67
End: 2017-10-30

## 2017-10-30 VITALS
TEMPERATURE: 98.6 F | WEIGHT: 148.2 LBS | BODY MASS INDEX: 26.26 KG/M2 | RESPIRATION RATE: 18 BRPM | HEART RATE: 83 BPM | SYSTOLIC BLOOD PRESSURE: 162 MMHG | DIASTOLIC BLOOD PRESSURE: 81 MMHG | HEIGHT: 63 IN | OXYGEN SATURATION: 95 %

## 2017-10-30 DIAGNOSIS — M54.5 ACUTE BILATERAL LOW BACK PAIN, WITH SCIATICA PRESENCE UNSPECIFIED: ICD-10-CM

## 2017-10-30 DIAGNOSIS — I10 ESSENTIAL HYPERTENSION: ICD-10-CM

## 2017-10-30 DIAGNOSIS — S32.591D PUBIC RAMUS FRACTURE, RIGHT, WITH ROUTINE HEALING, SUBSEQUENT ENCOUNTER: Primary | ICD-10-CM

## 2017-10-30 NOTE — PROGRESS NOTES
Chief Complaint   Patient presents with    Follow-up     patient stated she feels better but her back aches     1. Have you been to the ER, urgent care clinic since your last visit? Hospitalized since your last visit? No    2. Have you seen or consulted any other health care providers outside of the [address] [place] [address] since your last visit? Include any pap smears or colon screening.  No

## 2017-10-30 NOTE — PATIENT INSTRUCTIONS
Back Stretches: Exercises  Your Care Instructions  Here are some examples of exercises for stretching your back. Start each exercise slowly. Ease off the exercise if you start to have pain. Your doctor or physical therapist will tell you when you can start these exercises and which ones will work best for you. How to do the exercises  Overhead stretch    1. Stand comfortably with your feet shoulder-width apart. 2. Looking straight ahead, raise both arms over your head and reach toward the ceiling. Do not allow your head to tilt back. 3. Hold for 15 to 30 seconds, then lower your arms to your sides. 4. Repeat 2 to 4 times. Side stretch    1. Stand comfortably with your feet shoulder-width apart. 2. Raise one arm over your head, and then lean to the other side. 3. Slide your hand down your leg as you let the weight of your arm gently stretch your side muscles. Hold for 15 to 30 seconds. 4. Repeat 2 to 4 times on each side. Press-up    1. Lie on your stomach, supporting your body with your forearms. 2. Press your elbows down into the floor to raise your upper back. As you do this, relax your stomach muscles and allow your back to arch without using your back muscles. As your press up, do not let your hips or pelvis come off the floor. 3. Hold for 15 to 30 seconds, then relax. 4. Repeat 2 to 4 times. Relax and rest    1. Lie on your back with a rolled towel under your neck and a pillow under your knees. Extend your arms comfortably to your sides. 2. Relax and breathe normally. 3. Remain in this position for about 10 minutes. 4. If you can, do this 2 or 3 times each day. Follow-up care is a key part of your treatment and safety. Be sure to make and go to all appointments, and call your doctor if you are having problems. It's also a good idea to know your test results and keep a list of the medicines you take. Where can you learn more? Go to http://katerin-celina.info/.   Enter J720 in the search box to learn more about \"Back Stretches: Exercises. \"  Current as of: March 21, 2017  Content Version: 11.4  © 1264-2986 [employer]. Care instructions adapted under license by [employer] (which disclaims liability or warranty for this information). If you have questions about a medical condition or this instruction, always ask your healthcare professional. [address] any warranty or liability for your use of this information. Sacroiliac Pain: Exercises  Your Care Instructions  Here are some examples of typical rehabilitation exercises for your condition. Start each exercise slowly. Ease off the exercise if you start to have pain. Your doctor or physical therapist will tell you when you can start these exercises and which ones will work best for you. How to do the exercises  Knee-to-chest stretch    5. Do not do the knee-to-chest exercise if it causes or increases back or leg pain. 6. Lie on your back with your knees bent and your feet flat on the floor. You can put a small pillow under your head and neck if it is more comfortable. 7. Grasp your hands under one knee and bring the knee to your chest, keeping the other foot flat on the floor. 8. Keep your lower back pressed to the floor. Hold for at least 15 to 30 seconds. 9. Relax and lower the knee to the starting position. Repeat with the other leg. 10. Repeat 2 to 4 times with each leg. 11. To get more stretch, keep your other leg flat on the floor while pulling your knee to your chest.  Bridging    5. Lie on your back with both knees bent. Your knees should be bent about 90 degrees. 6. Tighten your belly muscles by pulling in your belly button toward your spine. Then push your feet into the floor, squeeze your buttocks, and lift your hips off the floor until your shoulders, hips, and knees are all in a straight line.   7. Hold for about 6 seconds as you continue to breathe normally, and then slowly lower your hips back down to the floor and rest for up to 10 seconds. 8. Repeat 8 to 12 times. Hip extension    5. Get down on your hands and knees on the floor. 6. Keeping your back and neck straight, lift one leg straight out behind you. When you lift your leg, keep your hips level. Don't let your back twist, and don't let your hip drop toward the floor. 7. Hold for 6 seconds. Repeat 8 to 12 times with each leg. 8. If you feel steady and strong when you do this exercise, you can make it more difficult. To do this, when you lift your leg, also lift the opposite arm straight out in front of you. For example, lift the left leg and the right arm at the same time. (This is sometimes called the \"bird dog exercise. \") Hold for 6 seconds, and repeat 8 to 12 times on each side. Clamshell    5. Lie on your side with a pillow under your head. Keep your feet and knees together and your knees bent. 6. Raise your top knee, but keep your feet together. Do not let your hips roll back. Your legs should open up like a clamshell. 7. Hold for 6 seconds. 8. Slowly lower your knee back down. Rest for 10 seconds. 9. Repeat 8 to 12 times. 10. Switch to your other side and repeat steps 1 through 5. Hamstring wall stretch    1. Lie on your back in a doorway, with one leg through the open door. 2. Slide your affected leg up the wall to straighten your knee. You should feel a gentle stretch down the back of your leg. 1. Do not arch your back. 2. Do not bend either knee. 3. Keep one heel touching the floor and the other heel touching the wall. Do not point your toes. 3. Hold the stretch for at least 1 minute to begin. Then try to lengthen the time you hold the stretch to as long as 6 minutes. 4. Switch legs, and repeat steps 1 through 3.  5. Repeat 2 to 4 times. 6. If you do not have a place to do this exercise in a doorway, there is another way to do it:  7. Lie on your back, and bend one knee.   8. Loop a towel under the ball and toes of that foot, and hold the ends of the towel in your hands. 9. Straighten your knee, and slowly pull back on the towel. You should feel a gentle stretch down the back of your leg. 10. Switch legs, and repeat steps 1 through 3.  11. Repeat 2 to 4 times. Lower abdominal strengthening    1. Lie on your back with your knees bent and your feet flat on the floor. 2. Tighten your belly muscles by pulling your belly button in toward your spine. 3. Lift one foot off the floor and bring your knee toward your chest, so that your knee is straight above your hip and your leg is bent like the letter \"L. \"  4. Lift the other knee up to the same position. 5. Lower one leg at a time to the starting position. 6. Keep alternating legs until you have lifted each leg 8 to 12 times. 7. Be sure to keep your belly muscles tight and your back still as you are moving your legs. Be sure to breathe normally. Piriformis stretch    1. Lie on your back with your legs straight. 2. Lift your affected leg, and bend your knee. With your opposite hand, reach across your body, and then gently pull your knee toward your opposite shoulder. 3. Hold the stretch for 15 to 30 seconds. 4. Switch legs and repeat steps 1 through 3.  5. Repeat 2 to 4 times. Follow-up care is a key part of your treatment and safety. Be sure to make and go to all appointments, and call your doctor if you are having problems. It's also a good idea to know your test results and keep a list of the medicines you take. Where can you learn more? Go to http://katerin-celina.info/. Enter N701 in the search box to learn more about \"Sacroiliac Pain: Exercises. \"  Current as of: March 21, 2017  Content Version: 11.4  © 6842-9603 [employer], [employer]. Care instructions adapted under license by [employer] (which disclaims liability or warranty for this information).  If you have questions about a medical condition or this instruction, always ask your healthcare professional. [place] [address] any warranty or liability for your use of this information.

## 2017-10-30 NOTE — PROGRESS NOTES
Rosario Nichols  77 y.o. female  1950  [address] [facility] [id number]  [address] [place] [address] Dr: Progress Note  Estela Tang MD       Encounter Date: 10/30/2017    Chief Complaint   Patient presents with    Follow-up     patient stated she feels better but her back aches     History of Present Illness   Rosario Nichols is a 77 y.o. female who presents to clinic today for follow-up on pelvic fracture. She notes significant improvement in her hip symptoms, however is still having some lower back pain. Denies weakness/numbness. Pt still has plenty of pain medication and is spacing this out. Review of Systems   Review of Systems   Eyes: Negative for blurred vision and double vision. Respiratory: Negative for shortness of breath. Cardiovascular: Negative for palpitations and leg swelling. Musculoskeletal: Positive for back pain. Negative for joint pain. Neurological: Negative for dizziness and headaches. Vitals/Objective:     Vitals:    10/30/17 1624 10/30/17 1648   BP: 195/81 162/81   Pulse: 83    Resp: 18    Temp: 98.6 °F (37 °C)    TempSrc: Oral    SpO2: 95%    Weight: 148 lb 3.2 oz (67.2 kg)    Height: 5' 3\" (1.6 m)      Body mass index is 26.25 kg/(m^2). Physical Exam   Constitutional: She is oriented to person, place, and time. No distress. Cardiovascular: Normal rate, regular rhythm, normal heart sounds and intact distal pulses. Exam reveals no gallop and no friction rub. No murmur heard. Pulmonary/Chest: Effort normal and breath sounds normal.   Musculoskeletal:        Right hip: She exhibits tenderness. Lumbar back: She exhibits tenderness (Si and paraspinal muscle tenderness). Neurological: She is alert and oriented to person, place, and time. Psychiatric: She has a normal mood and affect. Vitals reviewed. Assessment and Plan:   1. Pubic ramus fracture, right, with routine healing, subsequent encounter  Healing. Continue to space out pain medication. 2. Essential hypertension  Elevated, but improved with recheck. Still not at goal.  Patient notes at home BP <130/90. Advised to check at home and report back. 3. Acute bilateral low back pain, with sciatica presence unspecified  Likely MSK due to gait disturbance from pelvic fracture. Discussed stretching exercises. I have discussed the diagnosis with the patient and the intended plan as seen in the above orders. she has expressed understanding. The patient has received an after-visit summary and questions were answered concerning future plans. I have discussed medication side effects and warnings with the patient as well. Follow-up Disposition:  Return in about 4 weeks (around 11/27/2017). Electronically Signed: Jah Perry MD     History   Patients past medical, surgical and family histories were reviewed and updated. Past Medical History:   Diagnosis Date    Diverticulosis 9/23/2015    Hard of hearing     Hypertension     Iron deficiency anemia     baseline 9.7 on 8/2015    PUD (peptic ulcer disease)     hospitalized at [facility], EGD showed a nonbleeding esophageal ulcer, multiple gastric ulcers    Pulmonary nodule 8/2/2015    CT chest: 2.8 cm focal airspace disease RUL, pna vs mass, referred to pulm with PET    Type 2 diabetes mellitus without complication, without long-term current use of insulin ([facility] [address] 9/12/2015     Past Surgical History:   Procedure Laterality Date    HX MALIGNANT SKIN LESION EXCISION Right under  eye    HX PARTIAL HYSTERECTOMY Bilateral      Family History   Problem Relation Age of Onset    Diabetes Mother     Hypertension Mother     Alzheimer Mother     Pacemaker Mother     Heart Attack Father      Social History     Social History    Marital status:      Spouse name: N/A    Number of children: N/A    Years of education: N/A     Occupational History    Not on file.      Social History Main Topics    Smoking status: Former Smoker     Packs/day: 0.25     Years: 30.00     Quit date: 10/16/2016    Smokeless tobacco: Never Used    Alcohol use 0.0 oz/week     0 Standard drinks or equivalent per week      Comment: very rare    Drug use: No    Sexual activity: Not on file     Other Topics Concern    Not on file     Social History Narrative            Current Medications/Allergies     Current Outpatient Prescriptions   Medication Sig Dispense Refill    losartan (COZAAR) 100 mg tablet Take 1 Tab by mouth daily. 30 Tab 5    amLODIPine (NORVASC) 10 mg tablet Take 1 Tab by mouth nightly. Indications: hypertension 30 Tab 5    albuterol (PROVENTIL HFA, VENTOLIN HFA, PROAIR HFA) 90 mcg/actuation inhaler Take 2 Puffs by inhalation every four (4) hours as needed for Wheezing.  1 Inhaler 5     No Known Allergies

## 2017-10-30 NOTE — MR AVS SNAPSHOT
Visit Information Date & Time Provider Department Dept. Phone Encounter #  
 10/30/2017  3:40 PM Lucila Carias, [facility] [phone] [id number] Follow-up Instructions Return in about 4 weeks (around 11/27/2017). Upcoming Health Maintenance Date Due Hepatitis C Screening 1950 FOOT EXAM Q1 12/15/1960 EYE EXAM RETINAL OR DILATED Q1 12/15/1960 DTaP/Tdap/Td series (1 - Tdap) 12/15/1971 BREAST CANCER SCRN MAMMOGRAM 12/15/2000 ZOSTER VACCINE AGE 60> 10/15/2010 GLAUCOMA SCREENING Q2Y 12/15/2015 OSTEOPOROSIS SCREENING (DEXA) 12/15/2015 Pneumococcal 65+ Low/Medium Risk (1 of 2 - PCV13) 12/15/2015 MEDICARE YEARLY EXAM 12/15/2015 INFLUENZA AGE 9 TO ADULT 8/1/2017 HEMOGLOBIN A1C Q6M 3/28/2018 MICROALBUMIN Q1 9/28/2018 LIPID PANEL Q1 9/28/2018 COLONOSCOPY 8/3/2025 Allergies as of 10/30/2017  Review Complete On: 10/30/2017 By: Ar Grossman No Known Allergies Current Immunizations  Never Reviewed No immunizations on file. Not reviewed this visit You Were Diagnosed With   
  
 Codes Comments Pubic ramus fracture, right, with routine healing, subsequent encounter    -  Primary ICD-10-CM: S32.591D ICD-9-CM: V54.19 Essential hypertension     ICD-10-CM: I10 
ICD-9-CM: 401.9 Acute bilateral low back pain, with sciatica presence unspecified     ICD-10-CM: M54.5 ICD-9-CM: 724.2 Vitals BP Pulse Temp Resp Height(growth percentile) Weight(growth percentile) 162/81 (BP 1 Location: Left arm, BP Patient Position: Sitting) 83 98.6 °F (37 °C) (Oral) 18 5' 3\" (1.6 m) 148 lb 3.2 oz (67.2 kg) SpO2 BMI OB Status Smoking Status 95% 26.25 kg/m2 Hysterectomy Former Smoker Vitals History BMI and BSA Data Body Mass Index Body Surface Area  
 26.25 kg/m 2 1.73 m 2 Preferred Pharmacy Pharmacy Name Phone [facility], [address] [place] [address] Your Updated Medication List  
  
   
This list is accurate as of: 10/30/17  4:48 PM.  Always use your most recent med list.  
  
  
  
  
 albuterol 90 mcg/actuation inhaler Commonly known as:  PROVENTIL HFA, VENTOLIN HFA, PROAIR HFA Take 2 Puffs by inhalation every four (4) hours as needed for Wheezing. amLODIPine 10 mg tablet Commonly known as:  Christina Coker Take 1 Tab by mouth nightly. Indications: hypertension  
  
 losartan 100 mg tablet Commonly known as:  COZAAR Take 1 Tab by mouth daily. Follow-up Instructions Return in about 4 weeks (around 11/27/2017). Patient Instructions Back Stretches: Exercises Your Care Instructions Here are some examples of exercises for stretching your back. Start each exercise slowly. Ease off the exercise if you start to have pain. Your doctor or physical therapist will tell you when you can start these exercises and which ones will work best for you. How to do the exercises Overhead stretch 1. Stand comfortably with your feet shoulder-width apart. 2. Looking straight ahead, raise both arms over your head and reach toward the ceiling. Do not allow your head to tilt back. 3. Hold for 15 to 30 seconds, then lower your arms to your sides. 4. Repeat 2 to 4 times. Side stretch 1. Stand comfortably with your feet shoulder-width apart. 2. Raise one arm over your head, and then lean to the other side. 3. Slide your hand down your leg as you let the weight of your arm gently stretch your side muscles. Hold for 15 to 30 seconds. 4. Repeat 2 to 4 times on each side. Press-up 1. Lie on your stomach, supporting your body with your forearms. 2. Press your elbows down into the floor to raise your upper back.  As you do this, relax your stomach muscles and allow your back to arch without using your back muscles. As your press up, do not let your hips or pelvis come off the floor. 3. Hold for 15 to 30 seconds, then relax. 4. Repeat 2 to 4 times. Relax and rest 
 
1. Lie on your back with a rolled towel under your neck and a pillow under your knees. Extend your arms comfortably to your sides. 2. Relax and breathe normally. 3. Remain in this position for about 10 minutes. 4. If you can, do this 2 or 3 times each day. Follow-up care is a key part of your treatment and safety. Be sure to make and go to all appointments, and call your doctor if you are having problems. It's also a good idea to know your test results and keep a list of the medicines you take. Where can you learn more? Go to http://katerin-celina.info/. Enter Y811 in the search box to learn more about \"Back Stretches: Exercises. \" Current as of: March 21, 2017 Content Version: 11.4 © 2230-3830 [employer]. Care instructions adapted under license by [employer] (which disclaims liability or warranty for this information). If you have questions about a medical condition or this instruction, always ask your healthcare professional. [address] any warranty or liability for your use of this information. Sacroiliac Pain: Exercises Your Care Instructions Here are some examples of typical rehabilitation exercises for your condition. Start each exercise slowly. Ease off the exercise if you start to have pain. Your doctor or physical therapist will tell you when you can start these exercises and which ones will work best for you. How to do the exercises Knee-to-chest stretch 5. Do not do the knee-to-chest exercise if it causes or increases back or leg pain. 6. Lie on your back with your knees bent and your feet flat on the floor. You can put a small pillow under your head and neck if it is more comfortable. 7. Grasp your hands under one knee and bring the knee to your chest, keeping the other foot flat on the floor. 8. Keep your lower back pressed to the floor. Hold for at least 15 to 30 seconds. 9. Relax and lower the knee to the starting position. Repeat with the other leg. 10. Repeat 2 to 4 times with each leg. 11. To get more stretch, keep your other leg flat on the floor while pulling your knee to your chest. 
Bridging 5. Lie on your back with both knees bent. Your knees should be bent about 90 degrees. 6. Tighten your belly muscles by pulling in your belly button toward your spine. Then push your feet into the floor, squeeze your buttocks, and lift your hips off the floor until your shoulders, hips, and knees are all in a straight line. 7. Hold for about 6 seconds as you continue to breathe normally, and then slowly lower your hips back down to the floor and rest for up to 10 seconds. 8. Repeat 8 to 12 times. Hip extension 5. Get down on your hands and knees on the floor. 6. Keeping your back and neck straight, lift one leg straight out behind you. When you lift your leg, keep your hips level. Don't let your back twist, and don't let your hip drop toward the floor. 7. Hold for 6 seconds. Repeat 8 to 12 times with each leg. 8. If you feel steady and strong when you do this exercise, you can make it more difficult. To do this, when you lift your leg, also lift the opposite arm straight out in front of you. For example, lift the left leg and the right arm at the same time. (This is sometimes called the \"bird dog exercise. \") Hold for 6 seconds, and repeat 8 to 12 times on each side. Clamshell 5. Lie on your side with a pillow under your head. Keep your feet and knees together and your knees bent. 6. Raise your top knee, but keep your feet together. Do not let your hips roll back. Your legs should open up like a clamshell. 7. Hold for 6 seconds. 8. Slowly lower your knee back down. Rest for 10 seconds. 9. Repeat 8 to 12 times. 10. Switch to your other side and repeat steps 1 through 5. Hamstring wall stretch 1. Lie on your back in a doorway, with one leg through the open door. 2. Slide your affected leg up the wall to straighten your knee. You should feel a gentle stretch down the back of your leg. 1. Do not arch your back. 2. Do not bend either knee. 3. Keep one heel touching the floor and the other heel touching the wall. Do not point your toes. 3. Hold the stretch for at least 1 minute to begin. Then try to lengthen the time you hold the stretch to as long as 6 minutes. 4. Switch legs, and repeat steps 1 through 3. 
5. Repeat 2 to 4 times. 6. If you do not have a place to do this exercise in a doorway, there is another way to do it: 
7. Lie on your back, and bend one knee. 8. Loop a towel under the ball and toes of that foot, and hold the ends of the towel in your hands. 9. Straighten your knee, and slowly pull back on the towel. You should feel a gentle stretch down the back of your leg. 10. Switch legs, and repeat steps 1 through 3. 
11. Repeat 2 to 4 times. Lower abdominal strengthening 1. Lie on your back with your knees bent and your feet flat on the floor. 2. Tighten your belly muscles by pulling your belly button in toward your spine. 3. Lift one foot off the floor and bring your knee toward your chest, so that your knee is straight above your hip and your leg is bent like the letter \"L. \" 
4. Lift the other knee up to the same position. 5. Lower one leg at a time to the starting position. 6. Keep alternating legs until you have lifted each leg 8 to 12 times. 7. Be sure to keep your belly muscles tight and your back still as you are moving your legs. Be sure to breathe normally. Piriformis stretch 1. Lie on your back with your legs straight. 2. Lift your affected leg, and bend your knee.  With your opposite hand, reach across your body, and then gently pull your knee toward your opposite shoulder. 3. Hold the stretch for 15 to 30 seconds. 4. Switch legs and repeat steps 1 through 3. 
5. Repeat 2 to 4 times. Follow-up care is a key part of your treatment and safety. Be sure to make and go to all appointments, and call your doctor if you are having problems. It's also a good idea to know your test results and keep a list of the medicines you take. Where can you learn more? Go to http://katerin-celina.info/. Enter A958 in the search box to learn more about \"Sacroiliac Pain: Exercises. \" Current as of: March 21, 2017 Content Version: 11.4 © 7139-2901 [employer], [employer]. Care instructions adapted under license by [employer] (which disclaims liability or warranty for this information). If you have questions about a medical condition or this instruction, always ask your healthcare professional. [place] [address] any warranty or liability for your use of this information. Introducing [facility]! Dhiraj Buckner introduces [employer] patient portal. Now you can access parts of your medical record, email your doctor's office, and request medication refills online. 1. In your internet browser, go to [email or web address] 2. Click on the First Time User? Click Here link in the Sign In box. You will see the New Member Sign Up page. 3. Enter your [employer] Access Code exactly as it appears below. You will not need to use this code after youve completed the sign-up process. If you do not sign up before the expiration date, you must request a new code. · [employer] Access Code: [facility] Expires: 1/9/2018  3:57 PM 
 
4. Enter the last four digits of your Social Security Number (xxxx) and Date of Birth (mm/dd/yyyy) as indicated and click Submit. You will be taken to the next sign-up page. 5. Create a Avalara ID. This will be your A[employer]ara login ID and cannot be changed, so think of one that is secure and easy to remember. 6. Create a A[employer]ara password. You can change your password at any time. 7. Enter your Password Reset Question and Answer. This can be used at a later time if you forget your password. 8. Enter your e-mail address. You will receive e-mail notification when new information is available in [address] 9. Click Sign Up. You can now view and download portions of your medical record. 10. Click the Download Summary menu link to download a portable copy of your medical information. If you have questions, please visit the Frequently Asked Questions section of the [employer] website. Remember, [employer] is NOT to be used for urgent needs. For medical emergencies, dial 911. Now available from your iPhone and Android! Please provide this summary of care documentation to your next provider. Your primary care clinician is listed as Danielle Haynes. If you have any questions after today's visit, please call [phone].

## 2018-02-19 ENCOUNTER — TELEPHONE (OUTPATIENT)
Dept: FAMILY MEDICINE CLINIC | Age: 68
End: 2018-02-19

## 2018-02-19 NOTE — TELEPHONE ENCOUNTER
Patient's daughter called stating that patient needs a appointment with Dr. Misael Ferrari as she has put off scheduling appointments and needs refills of her pain medicine due to being out for awhile. I noticed that patient has an insurance that has switched over to [employer] (which we don't participate with). I confirmed with her that this is the insurance she has so I did inform her that if the patient chooses to be seen she will have to sign a waiver in agreement that she is held responsible for the visit. Patient's daughter asked does this mean she needs to find another doctor. I informed her that she can still come her if she chooses however [employer] as a whole does not participate and that again she would have to sign a waiver. Patient's daughter became upset stating that Dr. Misael Ferrari has been her doctor and that she choose him after that was an issue at the hospital that almost cost her mom her life and that she doesn't understand how he could not see her. I explained this is not Dr. Mariann Carbajal decision that again this is Blake Franks as a while. She asked where her insurance participates with. I tried to inform her that it is [facility]  [facility] however daughter kept cutting me off. Daughter asked to speak with Dr. Misael Ferrari. I informed her that because the patient's HIPAA is  that Dr. Misael Ferrari can only speak with the patient. Patient's daughter became more upset stating that Dr. Misael Ferrari knows who she is and that he WILL speak to her. I informed her that I will send a message back but that again he can only speak with patient. Daughter continued to yell at me stating that I was \"pissing her off\" and that she would be recording this call from the rest of the phone call. I informed her that was fine as all of our calls are recorded. I also informed her that if she still has any concerns she can address them with our practice administrator Reynold Moore. She asked to speak with her. Call was transferred to Constance's .  I also spoke with manager Bozena Cassidy who heard most of the conversation. Per Bozena Cassidy she is having phone call pulled.

## 2018-02-22 ENCOUNTER — OFFICE VISIT (OUTPATIENT)
Dept: FAMILY MEDICINE CLINIC | Age: 68
End: 2018-02-22

## 2018-02-22 VITALS
RESPIRATION RATE: 20 BRPM | WEIGHT: 147 LBS | BODY MASS INDEX: 26.05 KG/M2 | DIASTOLIC BLOOD PRESSURE: 99 MMHG | SYSTOLIC BLOOD PRESSURE: 203 MMHG | TEMPERATURE: 98.7 F | OXYGEN SATURATION: 95 % | HEIGHT: 63 IN | HEART RATE: 79 BPM

## 2018-02-22 DIAGNOSIS — I10 ESSENTIAL HYPERTENSION: ICD-10-CM

## 2018-02-22 DIAGNOSIS — I16.0 HYPERTENSIVE URGENCY: ICD-10-CM

## 2018-02-22 DIAGNOSIS — S76.111A QUADRICEPS STRAIN, RIGHT, INITIAL ENCOUNTER: Primary | ICD-10-CM

## 2018-02-22 DIAGNOSIS — M79.604 RIGHT LEG PAIN: ICD-10-CM

## 2018-02-22 PROBLEM — E11.21 TYPE 2 DIABETES WITH NEPHROPATHY (HCC): Status: ACTIVE | Noted: 2018-02-22

## 2018-02-22 RX ORDER — AMLODIPINE BESYLATE 10 MG/1
10 TABLET ORAL
Qty: 30 TAB | Refills: 5 | Status: SHIPPED | OUTPATIENT
Start: 2018-02-22 | End: 2018-10-01 | Stop reason: SDUPTHER

## 2018-02-22 RX ORDER — LOSARTAN POTASSIUM 100 MG/1
100 TABLET ORAL DAILY
Qty: 30 TAB | Refills: 5 | Status: SHIPPED | OUTPATIENT
Start: 2018-02-22 | End: 2018-10-01 | Stop reason: SDUPTHER

## 2018-02-22 RX ORDER — CLONIDINE HYDROCHLORIDE 0.1 MG/1
0.2 TABLET ORAL ONCE
Qty: 2 TAB | Refills: 0
Start: 2018-02-22 | End: 2018-02-22

## 2018-02-22 RX ORDER — OXYCODONE AND ACETAMINOPHEN 5; 325 MG/1; MG/1
1 TABLET ORAL
Qty: 42 TAB | Refills: 0 | Status: SHIPPED | OUTPATIENT
Start: 2018-02-22 | End: 2018-03-08

## 2018-02-22 NOTE — PROGRESS NOTES
Kenna Coelho  79 y.o. female  1950  [address] [facility] [id number]  [address] [place] [address] Dr: Progress Note  Jess Parada MD       Encounter Date: 2/22/2018    Chief Complaint   Patient presents with    Leg Pain     right leg pain x 3 weeks, pain level 10/10 when walking      History of Present Illness   Kenna Coelho is a 79 y.o. female who presents to clinic today for 3 weeks of worsening right-sided anterior thigh pain. Denies any known trauma. States that this feels different than when she had the pelvic fracture. She is having pain when going from sitting to standing or vice-versa and with walking. Is having to use her walker to get around, and has missed 2 days of work due to the pain. Is taking OTC analgesics (uncertain which she is taking) and using OTC creams which have provided only minimal benefit. A epsom salt bath did provide temporary relief. Current Pain Scale: 10 /10. Patient also notes that she has been out of her blood pressure medication for at least a month. Just notes that she forgets and that she hasn't been back to the pharmacy to pick it up. Denies severe headache, chest pain, dyspnea, change in vision at this time. Patients insurance no longer taken here. She has looked for alternative PCP but first available appt was in May. She will continue here at this time, but may switch due to insurance reasons. Review of Systems   Review of Systems   Constitutional: Negative for diaphoresis and malaise/fatigue. HENT: Hearing loss: Chronic. Eyes: Negative for blurred vision and double vision. Respiratory: Negative for shortness of breath. Cardiovascular: Negative for chest pain and palpitations. Genitourinary: Negative for dysuria. Musculoskeletal: Positive for back pain and joint pain. Negative for falls and neck pain. Right anterior thigh pain   Skin: Negative.     Neurological: Negative for dizziness, weakness and headaches. Vitals/Objective:     Vitals:    02/22/18 1805 02/22/18 1915 02/22/18 1937   BP: (!) 232/112 (!) 213/100 (!) 203/99   Pulse: 83 82 79   Resp: 20     Temp: 98.7 °F (37.1 °C)     TempSrc: Oral     SpO2: 95%     Weight: 147 lb (66.7 kg)     Height: 5' 3\" (1.6 m)       Body mass index is 26.04 kg/(m^2). General: Patient alert and oriented and in NAD  HEENT: PER/EOMI, no conjunctival pallor or scleral icterus. No thyromegaly or cervical lymphadenopathy. Facial droop chronic from stroke. Speech slurred. Hard of hearing. Heart: Regular rate and rhythm, No murmurs, rubs or gallops. 2+ peripheral pulses  Lungs: Clear to auscultation bilaterally, no wheezing, rales or rhonchi  Abd: +BS, non-tender, non-distended  MSK:  Right SI joint tenderness. No pain along iliac spine or pubic symphysis. Tender along mid-hamstring, worse when activating the muscle. FROM in hip and knee. No knee pain. Skin: No rashes or lesions noted on exposed skin,  Psych: Appropriate mood and affect    I personally reviewed the xray of hip and femur:  Healing pubic ramus fractures. No evidence of femur Fx. Mild hip DJD. Assessment and Plan:   1. Quadriceps strain, right, initial encounter  NSAIDS unwise given blood pressure and hx of gastric ulcers. Will treat with percocet. This is a limited, short-term treatment for pain and is not meant for long-term pain control. Reviewed risks of using opioid pain medications. May continue creams, soaks, heat/ice. Given home rehab exercises. - oxyCODONE-acetaminophen (PERCOCET) 5-325 mg per tablet; Take 1 Tab by mouth every eight (8) hours as needed for Pain for up to 14 days. Max Daily Amount: 3 Tabs. Dispense: 42 Tab; Refill: 0    2. Right leg pain  See above    3. Hypertensive urgency  Severely elevated BP without outward evidence of end-organ damage. Patient with hx of severely elevated blood pressures.   Given 0.2mg clonidine in office with some improvement over the initial 30 miniutes. Refilled home medications and recommend restarting as soon as possible. They have agreed to follow her BP at home and will contact us if her BP remains elevated despite starting her meds. Reviewed S/Sx of stroke and advised to seek immediate medical care if any of these symptoms present. They expressed understanding.  - cloNIDine HCl (CATAPRES) 0.1 mg tablet; Take 2 Tabs by mouth once for 1 dose. Dispense: 2 Tab; Refill: 0    4. Essential hypertension  - losartan (COZAAR) 100 mg tablet; Take 1 Tab by mouth daily. Dispense: 30 Tab; Refill: 5  - amLODIPine (NORVASC) 10 mg tablet; Take 1 Tab by mouth nightly. Indications: hypertension  Dispense: 30 Tab; Refill: 5      I have discussed the diagnosis with the patient and the intended plan as seen in the above orders. she has expressed understanding. The patient has received an after-visit summary and questions were answered concerning future plans. I have discussed medication side effects and warnings with the patient as well. Follow-up Disposition:  Return if symptoms worsen or fail to improve. Electronically Signed: Blanca Dodge MD     History   Patients past medical, surgical and family histories were reviewed and updated.     Past Medical History:   Diagnosis Date    Diverticulosis 9/23/2015    Hard of hearing     Hypertension     Iron deficiency anemia     baseline 9.7 on 8/2015    PUD (peptic ulcer disease)     hospitalized at [facility], EGD showed a nonbleeding esophageal ulcer, multiple gastric ulcers    Pulmonary nodule 8/2/2015    CT chest: 2.8 cm focal airspace disease RUL, pna vs mass, referred to pulm with PET    Type 2 diabetes mellitus without complication, without long-term current use of insulin ([facility] [address] 9/12/2015     Past Surgical History:   Procedure Laterality Date    HX MALIGNANT SKIN LESION EXCISION Right under  eye    HX PARTIAL HYSTERECTOMY Bilateral      Family History Problem Relation Age of Onset    Diabetes Mother     Hypertension Mother     Alzheimer Mother     Pacemaker Mother     Heart Attack Father      Social History     Social History    Marital status:      Spouse name: N/A    Number of children: N/A    Years of education: N/A     Occupational History    Not on file. Social History Main Topics    Smoking status: Former Smoker     Packs/day: 0.25     Years: 30.00     Quit date: 10/16/2016    Smokeless tobacco: Never Used    Alcohol use 0.0 oz/week     0 Standard drinks or equivalent per week      Comment: very rare    Drug use: No    Sexual activity: Not on file     Other Topics Concern    Not on file     Social History Narrative            Current Medications/Allergies     Current Outpatient Prescriptions   Medication Sig Dispense Refill    losartan (COZAAR) 100 mg tablet Take 1 Tab by mouth daily. 30 Tab 5    amLODIPine (NORVASC) 10 mg tablet Take 1 Tab by mouth nightly. Indications: hypertension 30 Tab 5    albuterol (PROVENTIL HFA, VENTOLIN HFA, PROAIR HFA) 90 mcg/actuation inhaler Take 2 Puffs by inhalation every four (4) hours as needed for Wheezing.  1 Inhaler 5     No Known Allergies

## 2018-02-22 NOTE — MR AVS SNAPSHOT
[address] [phone] Patient: Pam Shah MRN: [id number] SCOTT:[id number] Visit Information Date & Time Provider Department Dept. Phone Encounter #  
 2/22/2018  6:00 PM Babita Fitzgerald, [facility] [phone] [id number] Follow-up Instructions Return if symptoms worsen or fail to improve. Upcoming Health Maintenance Date Due Hepatitis C Screening 1950 FOOT EXAM Q1 12/15/1960 EYE EXAM RETINAL OR DILATED Q1 12/15/1960 DTaP/Tdap/Td series (1 - Tdap) 12/15/1971 BREAST CANCER SCRN MAMMOGRAM 12/15/2000 ZOSTER VACCINE AGE 60> 10/15/2010 GLAUCOMA SCREENING Q2Y 12/15/2015 OSTEOPOROSIS SCREENING (DEXA) 12/15/2015 Pneumococcal 65+ Low/Medium Risk (1 of 2 - PCV13) 12/15/2015 MEDICARE YEARLY EXAM 12/15/2015 Influenza Age 5 to Adult 8/1/2017 HEMOGLOBIN A1C Q6M 3/28/2018 MICROALBUMIN Q1 9/28/2018 LIPID PANEL Q1 9/28/2018 COLONOSCOPY 8/3/2025 Allergies as of 2/22/2018  Review Complete On: 10/30/2017 By: Juany Guevara No Known Allergies Current Immunizations  Never Reviewed No immunizations on file. Not reviewed this visit You Were Diagnosed With   
  
 Codes Comments Quadriceps strain, right, initial encounter    -  Primary ICD-10-CM: D97.156Y ICD-9-CM: 843.8 Right leg pain     ICD-10-CM: M79.604 ICD-9-CM: 729.5 Hypertensive urgency     ICD-10-CM: I16.0 ICD-9-CM: 401.9 Pubic ramus fracture, right, with routine healing, subsequent encounter     ICD-10-CM: S32.591D ICD-9-CM: V54.19 Essential hypertension     ICD-10-CM: I10 
ICD-9-CM: 401.9 Vitals BP Pulse Temp Resp Height(growth percentile) Weight(growth percentile) (!) 203/99 (BP 1 Location: Left arm, BP Patient Position: Sitting) 79 98.7 °F (37.1 °C) (Oral) 20 5' 3\" (1.6 m) 147 lb (66.7 kg) SpO2 BMI OB Status Smoking Status 95% 26.04 kg/m2 Hysterectomy Former Smoker Vitals History BMI and BSA Data Body Mass Index Body Surface Area 26.04 kg/m 2 1.72 m 2 Preferred Pharmacy Pharmacy Name Phone [address] [facility] [address] [facility] [phone] Your Updated Medication List  
  
   
This list is accurate as of 2/22/18  7:41 PM.  Always use your most recent med list.  
  
  
  
  
 albuterol 90 mcg/actuation inhaler Commonly known as:  PROVENTIL HFA, VENTOLIN HFA, PROAIR HFA Take 2 Puffs by inhalation every four (4) hours as needed for Wheezing. amLODIPine 10 mg tablet Commonly known as:  Lynnell Manual Take 1 Tab by mouth nightly. Indications: hypertension  
  
 cloNIDine HCl 0.1 mg tablet Commonly known as:  CATAPRES Take 2 Tabs by mouth once for 1 dose. losartan 100 mg tablet Commonly known as:  COZAAR Take 1 Tab by mouth daily. oxyCODONE-acetaminophen 5-325 mg per tablet Commonly known as:  PERCOCET Take 1 Tab by mouth every eight (8) hours as needed for Pain for up to 14 days. Max Daily Amount: 3 Tabs. Prescriptions Printed Refills  
 oxyCODONE-acetaminophen (PERCOCET) 5-325 mg per tablet 0 Sig: Take 1 Tab by mouth every eight (8) hours as needed for Pain for up to 14 days. Max Daily Amount: 3 Tabs. Class: Print Route: Oral  
  
Prescriptions Sent to Pharmacy Refills  
 losartan (COZAAR) 100 mg tablet 5 Sig: Take 1 Tab by mouth daily. Class: Normal  
 Pharmacy: [facility] [address], [address] Ph #: [phone] Route: Oral  
 amLODIPine (NORVASC) 10 mg tablet 5 Sig: Take 1 Tab by mouth nightly. Indications: hypertension Class: Normal  
 Pharmacy: [facility] [address], [address] Ph #: [phone] Route: Oral  
  
Follow-up Instructions Return if symptoms worsen or fail to improve. Patient Instructions Quadriceps Strain: Care Instructions Your Care Instructions A quadriceps strain happens when you overstretch, or pull, the quadriceps muscle. This big muscle runs down the front of your thigh. A strain can happen when you exercise or lift something or if you are injured in an accident. You may feel pain and tenderness that's worse when you move your injured leg. Your thigh may be swollen and bruised. If you have a bad strain, you may not be able to move your leg normally. A minor strain often heals well with rest and other treatment. But a severe strain may require medical treatment. If a severe strain isn't treated, you may have long-term problems. Follow-up care is a key part of your treatment and safety. Be sure to make and go to all appointments, and call your doctor if you are having problems. It's also a good idea to know your test results and keep a list of the medicines you take. How can you care for yourself at home? · Rest your injured leg. Don't put weight on it for a day or two. If your doctor advises you to, use crutches to rest the leg. · Put ice or a cold pack on the front of your thigh for 10 to 20 minutes at a time to stop swelling. Put a thin cloth between the ice and your skin. · Wrapping your thigh with an elastic bandage (such as an Ace wrap), will help decrease swelling. Don't wrap it too tightly, since this can cause more swelling below the affected area. · Elevate your thigh on pillows while applying ice and anytime you are sitting or lying down. · Ask your doctor if you can take an over-the-counter pain medicine, such as acetaminophen (Tylenol), ibuprofen (Advil, Motrin), or naproxen (Aleve). Be safe with medicines. Read and follow all instructions on the label. · Don't do anything that makes the pain worse. Return to your usual level of activity slowly. When should you call for help? Call your doctor now or seek immediate medical care if: 
? · You have severe or increasing pain. ? · You have tingling, weakness, or numbness in your injured leg. ? · You cannot move your injured leg. ? Watch closely for changes in your health, and be sure to contact your doctor if: 
? · You do not get better as expected. Where can you learn more? Go to http://katerin-celina.info/. Enter C263 in the search box to learn more about \"Quadriceps Strain: Care Instructions. \" Current as of: March 21, 2017 Content Version: 11.4 © 0441-8263 [employer]. Care instructions adapted under license by [employer] (which disclaims liability or warranty for this information). If you have questions about a medical condition or this instruction, always ask your healthcare professional. [address] any warranty or liability for your use of this information. Quadricep Muscle Strain: Rehab Exercises Your Care Instructions Here are some examples of typical rehabilitation exercises for your condition. Start each exercise slowly. Ease off the exercise if you start to have pain. Your doctor or physical therapist will tell you when you can start these exercises and which ones will work best for you. How to do the exercises Standing quadriceps stretch 1. If you are not steady on your feet, hold on to a chair, counter, or wall. 2. Bend the knee of the leg you want to stretch, and reach behind you to grab the front of your foot or ankle with the hand on the same side. For example, if you are stretching your right leg, use your right hand. 3. Keeping your knees next to each other, pull your foot toward your buttock until you feel a gentle stretch across the front of your hip and down the front of your thigh. Your knee should be pointed directly to the ground, and not out to the side. 4. Hold the stretch for at least 15 to 30 seconds. 5. Repeat 2 to 4 times. Quadricep and hip flexor stretch (lying on side) 1. Lie on your side with your good leg flat on the floor and your hand supporting your head. 2. Bend your top leg, and reach behind you to grab the front of that foot or ankle with your other hand. 3. Stretch your leg back by pulling your foot toward your buttock. You will feel the stretch in the front of your thigh. If this causes stress on your knee, do not do this stretch. 4. Hold the stretch for at least 15 to 30 seconds. 5. Repeat 2 to 4 times. Hamstring stretch (lying down) 1. Lie flat on your back with your legs straight. If you feel discomfort in your back, place a small towel roll under your lower back. 2. Holding the back of your affected leg for support, lift your leg straight up and toward your body until you feel a stretch at the back of your thigh. 3. Hold the stretch for at least 30 seconds. 4. Repeat 2 to 4 times. Follow-up care is a key part of your treatment and safety. Be sure to make and go to all appointments, and call your doctor if you are having problems. It's also a good idea to know your test results and keep a list of the medicines you take. Where can you learn more? Go to http://katerin-celina.info/. Enter R119 in the search box to learn more about \"Quadricep Muscle Strain: Rehab Exercises. \" Current as of: March 21, 2017 Content Version: 11.4 © 0559-5870 [employer]. Care instructions adapted under license by [employer] (which disclaims liability or warranty for this information). If you have questions about a medical condition or this instruction, always ask your healthcare professional. [address] [address] any warranty or liability for your use of this information. Acute High Blood Pressure: Care Instructions Your Care Instructions Acute high blood pressure is very high blood pressure.  It's a serious problem. Very high blood pressure can damage your brain, heart, eyes, and kidneys. You may have been given medicines to lower your blood pressure. You may have gotten them as pills or through a needle in one of your veins. This is called an IV. And maybe you were given other medicines too. These can be needed when high blood pressure causes other problems. To keep your blood pressure at a lower level, you may need to make healthy lifestyle changes. And you will probably need to take medicines. Be sure to follow up with your doctor about your blood pressure and what you can do about it. Follow-up care is a key part of your treatment and safety. Be sure to make and go to all appointments, and call your doctor if you are having problems. It's also a good idea to know your test results and keep a list of the medicines you take. How can you care for yourself at home? · See your doctor as often as he or she recommends. This is to make sure your blood pressure is under control. You will probably go at least 2 times a year. But it may be more often at first. 
· Take your blood pressure medicine exactly as prescribed. You may take one or more types. They include diuretics, beta-blockers, ACE inhibitors, calcium channel blockers, and angiotensin II receptor blockers. Call your doctor if you think you are having a problem with your medicine. · If you take blood pressure medicine, talk to your doctor before you take decongestants or anti-inflammatory medicine, such as ibuprofen. These can raise blood pressure. · Learn how to check your blood pressure at home. Check it often. · Ask your doctor if it's okay to drink alcohol. · Talk to your doctor about lifestyle changes that can help blood pressure. These include being active and not smoking. When should you call for help? Call 911 anytime you think you may need emergency care.  This may mean having symptoms that suggest that your blood pressure is causing a serious heart or blood vessel problem. Your blood pressure may be over 180/110. ? For example, call 911 if: 
? · You have symptoms of a heart attack. These may include: ¨ Chest pain or pressure, or a strange feeling in the chest. 
¨ Sweating. ¨ Shortness of breath. ¨ Nausea or vomiting. ¨ Pain, pressure, or a strange feeling in the back, neck, jaw, or upper belly or in one or both shoulders or arms. ¨ Lightheadedness or sudden weakness. ¨ A fast or irregular heartbeat. ? · You have symptoms of a stroke. These may include: 
¨ Sudden numbness, tingling, weakness, or loss of movement in your face, arm, or leg, especially on only one side of your body. ¨ Sudden vision changes. ¨ Sudden trouble speaking. ¨ Sudden confusion or trouble understanding simple statements. ¨ Sudden problems with walking or balance. ¨ A sudden, severe headache that is different from past headaches. ? · You have severe back or belly pain. ?Do not wait until your blood pressure comes down on its own. Get help right away. ?Call your doctor now or seek immediate care if: 
? · Your blood pressure is much higher than normal (such as 180/110 or higher), but you don't have symptoms. ? · You think high blood pressure is causing symptoms, such as: ¨ Severe headache. ¨ Blurry vision. ? Watch closely for changes in your health, and be sure to contact your doctor if: 
? · Your blood pressure measures 140/90 or higher at least 2 times. That means the top number is 140 or higher or the bottom number is 90 or higher, or both. ? · You think you may be having side effects from your blood pressure medicine. ? · Your blood pressure is usually normal, but it goes above normal at least 2 times. Where can you learn more? Go to http://katerin-celina.info/. Enter Y678 in the search box to learn more about \"Acute High Blood Pressure: Care Instructions. \" Current as of: September 21, 2016 Content Version: 11.4 © 2204-1645 [employer]. Care instructions adapted under license by [employer] (which disclaims liability or warranty for this information). If you have questions about a medical condition or this instruction, always ask your healthcare professional. Maryann Oropeza any warranty or liability for your use of this information. Introducing [facility] [place] [facility]! [employer] introduces [employer] patient portal. Now you can access parts of your medical record, email your doctor's office, and request medication refills online. 1. In your internet browser, go to [email or web address] 2. Click on the First Time User? Click Here link in the Sign In box. You will see the New Member Sign Up page. 3. Enter your [employer] Access Code exactly as it appears below. You will not need to use this code after youve completed the sign-up process. If you do not sign up before the expiration date, you must request a new code. · [employer] Access Code: [email or web address] Expires: 5/23/2018  7:41 PM 
 
4. Enter the last four digits of your Social Security Number (xxxx) and Date of Birth (mm/dd/yyyy) as indicated and click Submit. You will be taken to the next sign-up page. 5. Create a [employer] ID. This will be your [employer] login ID and cannot be changed, so think of one that is secure and easy to remember. 6. Create a [employer] password. You can change your password at any time. 7. Enter your Password Reset Question and Answer. This can be used at a later time if you forget your password. 8. Enter your e-mail address. You will receive e-mail notification when new information is available in [address] 9. Click Sign Up. You can now view and download portions of your medical record. 10. Click the Download Summary menu link to download a portable copy of your medical information.  
 
If you have questions, please visit the Frequently Asked Questions section of the Swrve. Remember, [employer] is NOT to be used for urgent needs. For medical emergencies, dial 911. Now available from your iPhone and Android! Please provide this summary of care documentation to your next provider. Your primary care clinician is listed as Manette Gloss. If you have any questions after today's visit, please call [phone].

## 2018-02-23 ENCOUNTER — TELEPHONE (OUTPATIENT)
Dept: FAMILY MEDICINE CLINIC | Age: 68
End: 2018-02-23

## 2018-02-23 NOTE — PATIENT INSTRUCTIONS
Quadriceps Strain: Care Instructions  Your Care Instructions    A quadriceps strain happens when you overstretch, or pull, the quadriceps muscle. This big muscle runs down the front of your thigh. A strain can happen when you exercise or lift something or if you are injured in an accident. You may feel pain and tenderness that's worse when you move your injured leg. Your thigh may be swollen and bruised. If you have a bad strain, you may not be able to move your leg normally. A minor strain often heals well with rest and other treatment. But a severe strain may require medical treatment. If a severe strain isn't treated, you may have long-term problems. Follow-up care is a key part of your treatment and safety. Be sure to make and go to all appointments, and call your doctor if you are having problems. It's also a good idea to know your test results and keep a list of the medicines you take. How can you care for yourself at home? · Rest your injured leg. Don't put weight on it for a day or two. If your doctor advises you to, use crutches to rest the leg. · Put ice or a cold pack on the front of your thigh for 10 to 20 minutes at a time to stop swelling. Put a thin cloth between the ice and your skin. · Wrapping your thigh with an elastic bandage (such as an Ace wrap), will help decrease swelling. Don't wrap it too tightly, since this can cause more swelling below the affected area. · Elevate your thigh on pillows while applying ice and anytime you are sitting or lying down. · Ask your doctor if you can take an over-the-counter pain medicine, such as acetaminophen (Tylenol), ibuprofen (Advil, Motrin), or naproxen (Aleve). Be safe with medicines. Read and follow all instructions on the label. · Don't do anything that makes the pain worse. Return to your usual level of activity slowly. When should you call for help?   Call your doctor now or seek immediate medical care if:  ? · You have severe or increasing pain. ? · You have tingling, weakness, or numbness in your injured leg. ? · You cannot move your injured leg. ? Watch closely for changes in your health, and be sure to contact your doctor if:  ? · You do not get better as expected. Where can you learn more? Go to http://katerin-celina.info/. Enter H633 in the search box to learn more about \"Quadriceps Strain: Care Instructions. \"  Current as of: March 21, 2017  Content Version: 11.4  © 2612-6814 [employer]. Care instructions adapted under license by [employer] (which disclaims liability or warranty for this information). If you have questions about a medical condition or this instruction, always ask your healthcare professional. [address] any warranty or liability for your use of this information. Quadricep Muscle Strain: Rehab Exercises  Your Care Instructions  Here are some examples of typical rehabilitation exercises for your condition. Start each exercise slowly. Ease off the exercise if you start to have pain. Your doctor or physical therapist will tell you when you can start these exercises and which ones will work best for you. How to do the exercises  Standing quadriceps stretch    1. If you are not steady on your feet, hold on to a chair, counter, or wall. 2. Bend the knee of the leg you want to stretch, and reach behind you to grab the front of your foot or ankle with the hand on the same side. For example, if you are stretching your right leg, use your right hand. 3. Keeping your knees next to each other, pull your foot toward your buttock until you feel a gentle stretch across the front of your hip and down the front of your thigh. Your knee should be pointed directly to the ground, and not out to the side. 4. Hold the stretch for at least 15 to 30 seconds. 5. Repeat 2 to 4 times. Quadricep and hip flexor stretch (lying on side)    1.  Lie on your side with your good leg flat on the floor and your hand supporting your head. 2. Bend your top leg, and reach behind you to grab the front of that foot or ankle with your other hand. 3. Stretch your leg back by pulling your foot toward your buttock. You will feel the stretch in the front of your thigh. If this causes stress on your knee, do not do this stretch. 4. Hold the stretch for at least 15 to 30 seconds. 5. Repeat 2 to 4 times. Hamstring stretch (lying down)    1. Lie flat on your back with your legs straight. If you feel discomfort in your back, place a small towel roll under your lower back. 2. Holding the back of your affected leg for support, lift your leg straight up and toward your body until you feel a stretch at the back of your thigh. 3. Hold the stretch for at least 30 seconds. 4. Repeat 2 to 4 times. Follow-up care is a key part of your treatment and safety. Be sure to make and go to all appointments, and call your doctor if you are having problems. It's also a good idea to know your test results and keep a list of the medicines you take. Where can you learn more? Go to http://katerin-celina.info/. Enter R941 in the search box to learn more about \"Quadricep Muscle Strain: Rehab Exercises. \"  Current as of: March 21, 2017  Content Version: 11.4  © 4068-8649 [employer]. Care instructions adapted under license by [employer] (which disclaims liability or warranty for this information). If you have questions about a medical condition or this instruction, always ask your healthcare professional. [place] [address] any warranty or liability for your use of this information. Acute High Blood Pressure: Care Instructions  Your Care Instructions    Acute high blood pressure is very high blood pressure. It's a serious problem. Very high blood pressure can damage your brain, heart, eyes, and kidneys.   You may have been given medicines to lower your blood pressure. You may have gotten them as pills or through a needle in one of your veins. This is called an IV. And maybe you were given other medicines too. These can be needed when high blood pressure causes other problems. To keep your blood pressure at a lower level, you may need to make healthy lifestyle changes. And you will probably need to take medicines. Be sure to follow up with your doctor about your blood pressure and what you can do about it. Follow-up care is a key part of your treatment and safety. Be sure to make and go to all appointments, and call your doctor if you are having problems. It's also a good idea to know your test results and keep a list of the medicines you take. How can you care for yourself at home? · See your doctor as often as he or she recommends. This is to make sure your blood pressure is under control. You will probably go at least 2 times a year. But it may be more often at first.  · Take your blood pressure medicine exactly as prescribed. You may take one or more types. They include diuretics, beta-blockers, ACE inhibitors, calcium channel blockers, and angiotensin II receptor blockers. Call your doctor if you think you are having a problem with your medicine. · If you take blood pressure medicine, talk to your doctor before you take decongestants or anti-inflammatory medicine, such as ibuprofen. These can raise blood pressure. · Learn how to check your blood pressure at home. Check it often. · Ask your doctor if it's okay to drink alcohol. · Talk to your doctor about lifestyle changes that can help blood pressure. These include being active and not smoking. When should you call for help? Call 911 anytime you think you may need emergency care. This may mean having symptoms that suggest that your blood pressure is causing a serious heart or blood vessel problem. Your blood pressure may be over 180/110. ? For example, call 911 if:  ? · You have symptoms of a heart attack. These may include:  ¨ Chest pain or pressure, or a strange feeling in the chest.  ¨ Sweating. ¨ Shortness of breath. ¨ Nausea or vomiting. ¨ Pain, pressure, or a strange feeling in the back, neck, jaw, or upper belly or in one or both shoulders or arms. ¨ Lightheadedness or sudden weakness. ¨ A fast or irregular heartbeat. ? · You have symptoms of a stroke. These may include:  ¨ Sudden numbness, tingling, weakness, or loss of movement in your face, arm, or leg, especially on only one side of your body. ¨ Sudden vision changes. ¨ Sudden trouble speaking. ¨ Sudden confusion or trouble understanding simple statements. ¨ Sudden problems with walking or balance. ¨ A sudden, severe headache that is different from past headaches. ? · You have severe back or belly pain. ?Do not wait until your blood pressure comes down on its own. Get help right away. ?Call your doctor now or seek immediate care if:  ? · Your blood pressure is much higher than normal (such as 180/110 or higher), but you don't have symptoms. ? · You think high blood pressure is causing symptoms, such as:  ¨ Severe headache. ¨ Blurry vision. ? Watch closely for changes in your health, and be sure to contact your doctor if:  ? · Your blood pressure measures 140/90 or higher at least 2 times. That means the top number is 140 or higher or the bottom number is 90 or higher, or both. ? · You think you may be having side effects from your blood pressure medicine. ? · Your blood pressure is usually normal, but it goes above normal at least 2 times. Where can you learn more? Go to http://katerin-celina.info/. Enter G542 in the search box to learn more about \"Acute High Blood Pressure: Care Instructions. \"  Current as of: September 21, 2016  Content Version: 11.4  © 7942-2666 [employer].  Care instructions adapted under license by [employer] (which disclaims liability or warranty for this information). If you have questions about a medical condition or this instruction, always ask your healthcare professional. [place] [address] any warranty or liability for your use of this information.

## 2018-02-23 NOTE — TELEPHONE ENCOUNTER
Pharmacy would like to know if they can switch the losartan to a 90-day supply with one refill.  Please advise

## 2018-02-25 ENCOUNTER — TELEPHONE (OUTPATIENT)
Dept: FAMILY MEDICINE CLINIC | Age: 68
End: 2018-02-25

## 2018-02-25 NOTE — LETTER
NOTIFICATION RETURN TO WORK / SCHOOL 
 
2/22/2018 1 Ms. Cathy Peterson [address] [facility] [address] To Whom It May Concern: 
 
Cathy Peterson is currently under the care of [address]. Please excuse her from work due to her medical condition. She will return to work/school on: 3/8/2018 unless changed by physician at a subsequent appointment. If there are questions or concerns please have the patient contact our office.  
 
 
 
Sincerely, 
 
 
 
Tobi Brice MD

## 2018-02-27 ENCOUNTER — TELEPHONE (OUTPATIENT)
Dept: FAMILY MEDICINE CLINIC | Age: 68
End: 2018-02-27

## 2018-02-27 NOTE — TELEPHONE ENCOUNTER
I called and left a message with sister and let her know that the letter is at the  ready for . She verbalized understanding.

## 2018-02-27 NOTE — TELEPHONE ENCOUNTER
Patient daughter Luciana Pichardo) on hippa with all access, is asking to be contacted about previous message ASAP.

## 2018-02-27 NOTE — TELEPHONE ENCOUNTER
Patient daughter Tracee Wade) on hippa calling and states that we should have received Leave of Absence Forms on today and would like this verified. Form to be completed ASAP.

## 2018-03-01 ENCOUNTER — OFFICE VISIT (OUTPATIENT)
Dept: FAMILY MEDICINE CLINIC | Age: 68
End: 2018-03-01

## 2018-03-01 VITALS
BODY MASS INDEX: 25.98 KG/M2 | OXYGEN SATURATION: 94 % | RESPIRATION RATE: 20 BRPM | TEMPERATURE: 99 F | HEIGHT: 63 IN | SYSTOLIC BLOOD PRESSURE: 146 MMHG | HEART RATE: 80 BPM | WEIGHT: 146.6 LBS | DIASTOLIC BLOOD PRESSURE: 78 MMHG

## 2018-03-01 DIAGNOSIS — S76.111D QUADRICEPS MUSCLE STRAIN, RIGHT, SUBSEQUENT ENCOUNTER: Primary | ICD-10-CM

## 2018-03-01 RX ORDER — CYCLOBENZAPRINE HCL 10 MG
5 TABLET ORAL
Qty: 15 TAB | Refills: 0 | Status: SHIPPED | OUTPATIENT
Start: 2018-03-01 | End: 2019-10-30 | Stop reason: ALTCHOICE

## 2018-03-01 RX ORDER — DICLOFENAC SODIUM 10 MG/G
2 GEL TOPICAL 4 TIMES DAILY
Qty: 100 G | Refills: 0 | Status: SHIPPED | OUTPATIENT
Start: 2018-03-01 | End: 2018-10-16 | Stop reason: ALTCHOICE

## 2018-03-01 RX ORDER — OXYCODONE AND ACETAMINOPHEN 7.5; 325 MG/1; MG/1
1 TABLET ORAL
Qty: 42 TAB | Refills: 0 | Status: SHIPPED | OUTPATIENT
Start: 2018-03-08 | End: 2018-03-29 | Stop reason: SDUPTHER

## 2018-03-01 NOTE — PROGRESS NOTES
Chief Complaint   Patient presents with    Other     possible pulled muscle right leg     1. Have you been to the ER, urgent care clinic since your last visit? Hospitalized since your last visit? No    2. Have you seen or consulted any other health care providers outside of the [address] [place] [address] since your last visit? Include any pap smears or colon screening. No     Reviewed record in preparation for visit and have obtained necessary documentation.

## 2018-03-01 NOTE — MR AVS SNAPSHOT
[address] 
[phone] Patient: Rama Oppenheim MRN: [id number] NVP:[id number] Visit Information Date & Time Provider Department Dept. Phone Encounter #  
 3/1/2018  5:30 PM Adryan Werner, [facility] [phone] [id number] Upcoming Health Maintenance Date Due Hepatitis C Screening 1950 FOOT EXAM Q1 12/15/1960 EYE EXAM RETINAL OR DILATED Q1 12/15/1960 DTaP/Tdap/Td series (1 - Tdap) 12/15/1971 BREAST CANCER SCRN MAMMOGRAM 12/15/2000 ZOSTER VACCINE AGE 60> 10/15/2010 GLAUCOMA SCREENING Q2Y 12/15/2015 OSTEOPOROSIS SCREENING (DEXA) 12/15/2015 Pneumococcal 65+ Low/Medium Risk (1 of 2 - PCV13) 12/15/2015 MEDICARE YEARLY EXAM 12/15/2015 Influenza Age 5 to Adult 8/1/2017 HEMOGLOBIN A1C Q6M 3/28/2018 MICROALBUMIN Q1 9/28/2018 LIPID PANEL Q1 9/28/2018 COLONOSCOPY 8/3/2025 Allergies as of 3/1/2018  Review Complete On: 10/30/2017 By: Delbert Caldwell No Known Allergies Current Immunizations  Never Reviewed No immunizations on file. Not reviewed this visit You Were Diagnosed With   
  
 Codes Comments Quadriceps muscle strain, right, subsequent encounter    -  Primary ICD-10-CM: A82.069G ICD-9-CM: V58.89, 843.8 Vitals BP Pulse Temp Resp Height(growth percentile) Weight(growth percentile) 179/74 (BP 1 Location: Left arm, BP Patient Position: Sitting) 89 99 °F (37.2 °C) (Oral) 20 5' 3\" (1.6 m) 146 lb 9.6 oz (66.5 kg) SpO2 BMI OB Status Smoking Status 94% 25.97 kg/m2 Hysterectomy Former Smoker Vitals History BMI and BSA Data Body Mass Index Body Surface Area  
 25.97 kg/m 2 1.72 m 2 Preferred Pharmacy [facility] Name Phone [facility] [address] [facility] [address] [facility] [phone] Your Updated Medication List  
  
   
 This list is accurate as of 3/1/18  6:10 PM.  Always use your most recent med list.  
  
  
  
  
 albuterol 90 mcg/actuation inhaler Commonly known as:  PROVENTIL HFA, VENTOLIN HFA, PROAIR HFA Take 2 Puffs by inhalation every four (4) hours as needed for Wheezing. amLODIPine 10 mg tablet Commonly known as:  Abena Paez Take 1 Tab by mouth nightly. Indications: hypertension  
  
 cyclobenzaprine 10 mg tablet Commonly known as:  FLEXERIL Take 0.5 Tabs by mouth three (3) times daily as needed for Muscle Spasm(s). diclofenac 1 % Gel Commonly known as:  VOLTAREN Apply 2 g to affected area four (4) times daily. losartan 100 mg tablet Commonly known as:  COZAAR Take 1 Tab by mouth daily. * oxyCODONE-acetaminophen 5-325 mg per tablet Commonly known as:  PERCOCET Take 1 Tab by mouth every eight (8) hours as needed for Pain for up to 14 days. Max Daily Amount: 3 Tabs. * oxyCODONE-acetaminophen 7.5-325 mg per tablet Commonly known as:  PERCOCET 7.5 Take 1 Tab by mouth every eight (8) hours as needed for Pain. Max Daily Amount: 3 Tabs. Start taking on:  3/8/2018 * Notice: This list has 2 medication(s) that are the same as other medications prescribed for you. Read the directions carefully, and ask your doctor or other care provider to review them with you. Prescriptions Printed Refills  
 oxyCODONE-acetaminophen (PERCOCET 7.5) 7.5-325 mg per tablet 0 Starting on: 3/8/2018 Sig: Take 1 Tab by mouth every eight (8) hours as needed for Pain. Max Daily Amount: 3 Tabs. Class: Print Route: Oral  
  
Prescriptions Sent to Pharmacy Refills  
 diclofenac (VOLTAREN) 1 % gel 0 Sig: Apply 2 g to affected area four (4) times daily. Class: Normal  
 Pharmacy: [facility] [address], [address] [place] [address] #: [phone]  Route: Topical  
 cyclobenzaprine (FLEXERIL) 10 mg tablet 0  
 Sig: Take 0.5 Tabs by mouth three (3) times daily as needed for Muscle Spasm(s). Class: Normal  
 Pharmacy: [facility] [address], [address] #: [phone] Route: Oral  
  
We Performed the Following REFERRAL TO PHYSICAL THERAPY [TZM05 Custom] Comments:  
 Please evaluate patient for quad strain Referral Information Referral ID Referred By Referred To  
  
 [id number] Mamta Natalie Not Available Visits Status Start Date End Date 1 New Request 3/1/18 3/1/19 If your referral has a status of pending review or denied, additional information will be sent to support the outcome of this decision. Introducing [facility]! Carmelita Fothergill introduces [employer] patient portal. Now you can access parts of your medical record, email your doctor's office, and request medication refills online. 1. In your internet browser, go to [email or web address] 2. Click on the First Time User? Click Here link in the Sign In box. You will see the New Member Sign Up page. 3. Enter your [employer] Access Code exactly as it appears below. You will not need to use this code after youve completed the sign-up process. If you do not sign up before the expiration date, you must request a new code. · [employer] Access Code: [email or web address] Expires: 5/23/2018  7:41 PM 
 
4. Enter the last four digits of your Social Security Number (xxxx) and Date of Birth (mm/dd/yyyy) as indicated and click Submit. You will be taken to the next sign-up page. 5. Create a [employer] ID. This will be your [employer] login ID and cannot be changed, so think of one that is secure and easy to remember. 6. Create a [employer] password. You can change your password at any time. 7. Enter your Password Reset Question and Answer. This can be used at a later time if you forget your password. 8. Enter your e-mail address.  You will receive e-mail notification when new information is available in [employer]. 9. Click Sign Up. You can now view and download portions of your medical record. 10. Click the Download Summary menu link to download a portable copy of your medical information. If you have questions, please visit the Frequently Asked Questions section of the [employer] website. Remember, [employer] is NOT to be used for urgent needs. For medical emergencies, dial 911. Now available from your iPhone and Android! Please provide this summary of care documentation to your next provider. Your primary care clinician is listed as Rafaela Lacey. If you have any questions after today's visit, please call [phone].

## 2018-03-02 NOTE — PROGRESS NOTES
Pam Shah  79 y.o. female  1950  [address] [facility] [address]  [address] [place] [address] Dr: Progress Note  Babita Fitzgerald MD       Encounter Date: 3/1/2018    Chief Complaint   Patient presents with    Other     possible pulled muscle right leg     History of Present Illness   Pam Shah is a 79 y.o. female who presents to clinic today for follow-up on right thigh pain. Notes this has continued since last visit. Was severe enough that she had difficulty walking, though overall notes some improvement. Taking pain medication 1-2 tablets at a time due to pain. Has most pain and weakness when going from seated to standing position and then initiating her gait. Review of Systems   Review of Systems   Musculoskeletal: Positive for myalgias. Negative for falls. Right thigh pain     Vitals/Objective:     Vitals:    03/01/18 1736 03/01/18 1811   BP: 179/74 146/78   Pulse: 89 80   Resp: 20    Temp: 99 °F (37.2 °C)    TempSrc: Oral    SpO2: 94%    Weight: 146 lb 9.6 oz (66.5 kg)    Height: 5' 3\" (1.6 m)      Body mass index is 25.97 kg/(m^2). Physical Exam   Constitutional: She is oriented to person, place, and time. Musculoskeletal:        Legs:  Neurological: She is alert and oriented to person, place, and time. She is not disoriented. She displays no tremor. Abnormal muscle tone: Gerneralized muscle wasting, but maintains 5/5 strength. Assessment and Plan:   1. Quadriceps muscle strain, right, subsequent encounter  Continue HEP. Work paperwork completed. May extend temp opioid pain medication. Try voltaren gel. - oxyCODONE-acetaminophen (PERCOCET 7.5) 7.5-325 mg per tablet; Take 1 Tab by mouth every eight (8) hours as needed for Pain. Max Daily Amount: 3 Tabs. Dispense: 42 Tab; Refill: 0  - diclofenac (VOLTAREN) 1 % gel; Apply 2 g to affected area four (4) times daily.   Dispense: 100 g; Refill: 0  - cyclobenzaprine (FLEXERIL) 10 mg tablet; Take 0.5 Tabs by mouth three (3) times daily as needed for Muscle Spasm(s). Dispense: 15 Tab; Refill: 0  - REFERRAL TO PHYSICAL THERAPY      I have discussed the diagnosis with the patient and the intended plan as seen in the above orders. she has expressed understanding. The patient has received an after-visit summary and questions were answered concerning future plans. I have discussed medication side effects and warnings with the patient as well. Follow-up Disposition: Not on File    Electronically Signed: Yanna Gonzalez MD     History   Patients past medical, surgical and family histories were reviewed and updated. Past Medical History:   Diagnosis Date    Diverticulosis 9/23/2015    Hard of hearing     Hypertension     Iron deficiency anemia     baseline 9.7 on 8/2015    PUD (peptic ulcer disease)     hospitalized at [facility], EGD showed a nonbleeding esophageal ulcer, multiple gastric ulcers    Pulmonary nodule 8/2/2015    CT chest: 2.8 cm focal airspace disease RUL, pna vs mass, referred to pulm with PET    Type 2 diabetes mellitus without complication, without long-term current use of insulin ([facility] [address] 9/12/2015     Past Surgical History:   Procedure Laterality Date    HX MALIGNANT SKIN LESION EXCISION Right under  eye    HX PARTIAL HYSTERECTOMY Bilateral      Family History   Problem Relation Age of Onset    Diabetes Mother     Hypertension Mother     Alzheimer Mother     Pacemaker Mother     Heart Attack Father      Social History     Social History    Marital status:      Spouse name: N/A    Number of children: N/A    Years of education: N/A     Occupational History    Not on file.      Social History Main Topics    Smoking status: Former Smoker     Packs/day: 0.25     Years: 30.00     Quit date: 10/16/2016    Smokeless tobacco: Never Used    Alcohol use 0.0 oz/week     0 Standard drinks or equivalent per week      Comment: very rare    Drug use: No    Sexual activity: Not on file     Other Topics Concern    Not on file     Social History Narrative            Current Medications/Allergies     Current Outpatient Prescriptions   Medication Sig Dispense Refill    [START ON 3/8/2018] oxyCODONE-acetaminophen (PERCOCET 7.5) 7.5-325 mg per tablet Take 1 Tab by mouth every eight (8) hours as needed for Pain. Max Daily Amount: 3 Tabs. 42 Tab 0    diclofenac (VOLTAREN) 1 % gel Apply 2 g to affected area four (4) times daily. 100 g 0    cyclobenzaprine (FLEXERIL) 10 mg tablet Take 0.5 Tabs by mouth three (3) times daily as needed for Muscle Spasm(s). 15 Tab 0    oxyCODONE-acetaminophen (PERCOCET) 5-325 mg per tablet Take 1 Tab by mouth every eight (8) hours as needed for Pain for up to 14 days. Max Daily Amount: 3 Tabs. 42 Tab 0    losartan (COZAAR) 100 mg tablet Take 1 Tab by mouth daily. 30 Tab 5    amLODIPine (NORVASC) 10 mg tablet Take 1 Tab by mouth nightly. Indications: hypertension 30 Tab 5    albuterol (PROVENTIL HFA, VENTOLIN HFA, PROAIR HFA) 90 mcg/actuation inhaler Take 2 Puffs by inhalation every four (4) hours as needed for Wheezing.  1 Inhaler 5     No Known Allergies

## 2018-03-02 NOTE — TELEPHONE ENCOUNTER
----- Message from Rikki Keene sent at 3/2/2018  1:30 PM EST -----  Regarding: Dr. Gilmer Spencer stated the paper work filled out on 3/1/18 for leave of absence was not filled out properly. Pt asking the doctor to fill out question number 1 for the dates the pt was seen by the doctor and fax them in. Best Contact [phone].

## 2018-03-07 NOTE — TELEPHONE ENCOUNTER
Patient daughter calling back  [place] all access  On hippa. Notes deadline is  3/13/18 to [facility]king that message be addressed asa. [address] [place] [address] states she's going out of town next week and trying to get things straight for her mother.

## 2018-03-08 NOTE — TELEPHONE ENCOUNTER
I called and spoke with the daughter and she will be bringing in a duplicate form for Dr. Neva Pineda to fill out. She indicated that she will bring before lunch.

## 2018-03-29 ENCOUNTER — OFFICE VISIT (OUTPATIENT)
Dept: FAMILY MEDICINE CLINIC | Age: 68
End: 2018-03-29

## 2018-03-29 VITALS
TEMPERATURE: 99.1 F | SYSTOLIC BLOOD PRESSURE: 147 MMHG | HEART RATE: 94 BPM | OXYGEN SATURATION: 95 % | WEIGHT: 149 LBS | RESPIRATION RATE: 16 BRPM | BODY MASS INDEX: 26.4 KG/M2 | DIASTOLIC BLOOD PRESSURE: 66 MMHG | HEIGHT: 63 IN

## 2018-03-29 DIAGNOSIS — S76.111D QUADRICEPS MUSCLE STRAIN, RIGHT, SUBSEQUENT ENCOUNTER: Primary | ICD-10-CM

## 2018-03-29 RX ORDER — OXYCODONE AND ACETAMINOPHEN 7.5; 325 MG/1; MG/1
1 TABLET ORAL
Qty: 42 TAB | Refills: 0 | Status: SHIPPED | OUTPATIENT
Start: 2018-03-29 | End: 2018-05-01 | Stop reason: SDUPTHER

## 2018-03-29 NOTE — MR AVS SNAPSHOT
[address] 
[phone] Patient: David Hall MRN: [id number] Visit Information Date & Time Provider Department Dept. Phone Encounter #  
 3/29/2018  4:00 PM Irish Gonzales, 15[address] [facility] [phone] [id number] Follow-up Instructions Return in about 4 weeks (around 4/26/2018). Upcoming Health Maintenance Date Due Hepatitis C Screening 1950 FOOT EXAM Q1 12/15/1960 EYE EXAM RETINAL OR DILATED Q1 12/15/1960 DTaP/Tdap/Td series (1 - Tdap) 12/15/1971 BREAST CANCER SCRN MAMMOGRAM 12/15/2000 ZOSTER VACCINE AGE 60> 10/15/2010 GLAUCOMA SCREENING Q2Y 12/15/2015 Bone Densitometry (Dexa) Screening 12/15/2015 Pneumococcal 65+ Low/Medium Risk (1 of 2 - PCV13) 12/15/2015 Influenza Age 5 to Adult 8/1/2017 MEDICARE YEARLY EXAM 3/20/2018 HEMOGLOBIN A1C Q6M 3/28/2018 MICROALBUMIN Q1 9/28/2018 LIPID PANEL Q1 9/28/2018 COLONOSCOPY 8/3/2025 Allergies as of 3/29/2018  Review Complete On: 3/29/2018 By: Martha Rivera LPN No Known Allergies Current Immunizations  Never Reviewed No immunizations on file. Not reviewed this visit You Were Diagnosed With   
  
 Codes Comments Quadriceps muscle strain, right, subsequent encounter    -  Primary ICD-10-CM: Q90.631X ICD-9-CM: V58.89, 843.8 Vitals BP Pulse Temp Resp Height(growth percentile) Weight(growth percentile) 147/66 94 99.1 °F (37.3 °C) (Oral) 16 5' 3\" (1.6 m) 149 lb (67.6 kg) SpO2 BMI OB Status Smoking Status 95% 26.39 kg/m2 Hysterectomy Former Smoker Vitals History BMI and BSA Data Body Mass Index Body Surface Area  
 26.39 kg/m 2 1.73 m 2 Preferred Pharmacy Pharmacy Name Phone 1941 [facility], [address] [phone] Your Updated Medication List  
  
   
This list is accurate as of 3/29/18  4:40 PM.  Always use your most recent med list.  
  
  
  
  
 albuterol 90 mcg/actuation inhaler Commonly known as:  PROVENTIL HFA, VENTOLIN HFA, PROAIR HFA Take 2 Puffs by inhalation every four (4) hours as needed for Wheezing. amLODIPine 10 mg tablet Commonly known as:  Jade Shailesh Take 1 Tab by mouth nightly. Indications: hypertension  
  
 cyclobenzaprine 10 mg tablet Commonly known as:  FLEXERIL Take 0.5 Tabs by mouth three (3) times daily as needed for Muscle Spasm(s). diclofenac 1 % Gel Commonly known as:  VOLTAREN Apply 2 g to affected area four (4) times daily. losartan 100 mg tablet Commonly known as:  COZAAR Take 1 Tab by mouth daily. oxyCODONE-acetaminophen 7.5-325 mg per tablet Commonly known as:  PERCOCET 7.5 Take 1 Tab by mouth every eight (8) hours as needed for Pain. Max Daily Amount: 3 Tabs. Prescriptions Printed Refills  
 oxyCODONE-acetaminophen (PERCOCET 7.5) 7.5-325 mg per tablet 0 Sig: Take 1 Tab by mouth every eight (8) hours as needed for Pain. Max Daily Amount: 3 Tabs. Class: Print Route: Oral  
  
Follow-up Instructions Return in about 4 weeks (around 4/26/2018). Introducing [facility] & HEALTH SERVICES! Abdiel Kevin introduces [email or web address] patient portal. Now you can access parts of your medical record, email your doctor's office, and request medication refills online. 1. In your internet browser, go to [email or web address] 2. Click on the First Time User? Click Here link in the Sign In box. You will see the New Member Sign Up page. 3. Enter your [email or web address] Access Code exactly as it appears below. You will not need to use this code after youve completed the sign-up process. If you do not sign up before the expiration date, you must request a new code. · [email or web address] Access Code: [email or web address] Expires: 5/23/2018  8:41 PM 
 
 4. Enter the last four digits of your Social Security Number (xxxx) and Date of Birth (mm/dd/yyyy) as indicated and click Submit. You will be taken to the next sign-up page. 5. Create a BigMachines ID. This will be your BigMachines login ID and cannot be changed, so think of one that is secure and easy to remember. 6. Create a BigMachines password. You can change your password at any time. 7. Enter your Password Reset Question and Answer. This can be used at a later time if you forget your password. 8. Enter your e-mail address. You will receive e-mail notification when new information is available in [address] 9. Click Sign Up. You can now view and download portions of your medical record. 10. Click the Download Summary menu link to download a portable copy of your medical information. If you have questions, please visit the Frequently Asked Questions section of the BigMachines website. Remember, BigMachines is NOT to be used for urgent needs. For medical emergencies, dial 911. Now available from your iPhone and Android! Please provide this summary of care documentation to your next provider. Your primary care clinician is listed as Itz Lauren. If you have any questions after today's visit, please call [phone].

## 2018-04-09 NOTE — PROGRESS NOTES
Li Cool  79 y.o. female  1950  [address] [facility] [address]  [address] [place] [address] Dr: Progress Note  Rin Yousif MD       Encounter Date: 3/29/2018    Chief Complaint   Patient presents with    Follow-up     pain---asked for an extension for time away from work     History of Present Illness   Li Cool is a 79 y.o. female who presents to clinic today for follow-up on right thigh pain. Notes this has continued since last visit. Has had some improvement since last visit. Is able to ambulate with a cane or walker, but still experiences pain when trying to ambulate without and also with standing for more than 5-10 minutes. Review of Systems   Review of Systems   Musculoskeletal: Positive for myalgias. Negative for falls. Right thigh pain     Vitals/Objective:     Vitals:    03/29/18 1623   BP: 147/66   Pulse: 94   Resp: 16   Temp: 99.1 °F (37.3 °C)   TempSrc: Oral   SpO2: 95%   Weight: 149 lb (67.6 kg)   Height: 5' 3\" (1.6 m)     Body mass index is 26.39 kg/(m^2). Physical Exam   Constitutional: She is oriented to person, place, and time. Musculoskeletal:        Legs:  Neurological: She is alert and oriented to person, place, and time. She is not disoriented. She displays no tremor. Abnormal muscle tone: Gerneralized muscle wasting, but maintains 5/5 strength. Assessment and Plan:   1. Quadriceps muscle strain, right, subsequent encounter  Continue HEP. Work paperwork completed. May extend temp opioid pain medication. Try voltaren gel. Let us know which PT group she wants to work with  - oxyCODONE-acetaminophen (PERCOCET 7.5) 7.5-325 mg per tablet; Take 1 Tab by mouth every eight (8) hours as needed for Pain. Max Daily Amount: 3 Tabs. Dispense: 42 Tab; Refill: 0    I have discussed the diagnosis with the patient and the intended plan as seen in the above orders. she has expressed understanding.   The patient has received an after-visit summary and questions were answered concerning future plans. I have discussed medication side effects and warnings with the patient as well. Follow-up Disposition:  Return in about 4 weeks (around 4/26/2018). Electronically Signed: Jan Harper MD     History   Patients past medical, surgical and family histories were reviewed and updated. Past Medical History:   Diagnosis Date    Diverticulosis 9/23/2015    Hard of hearing     Hypertension     Iron deficiency anemia     baseline 9.7 on 8/2015    PUD (peptic ulcer disease)     hospitalized at [facility], EGD showed a nonbleeding esophageal ulcer, multiple gastric ulcers    Pulmonary nodule 8/2/2015    CT chest: 2.8 cm focal airspace disease RUL, pna vs mass, referred to pulm with PET    Type 2 diabetes mellitus without complication, without long-term current use of insulin ([facility].) 9/12/2015     Past Surgical History:   Procedure Laterality Date    HX MALIGNANT SKIN LESION EXCISION Right under  eye    HX PARTIAL HYSTERECTOMY Bilateral      Family History   Problem Relation Age of Onset    Diabetes Mother     Hypertension Mother     Alzheimer Mother     Pacemaker Mother     Heart Attack Father      Social History     Social History    Marital status:      Spouse name: N/A    Number of children: N/A    Years of education: N/A     Occupational History    Not on file.      Social History Main Topics    Smoking status: Former Smoker     Packs/day: 0.25     Years: 30.00     Quit date: 10/16/2016    Smokeless tobacco: Never Used    Alcohol use 0.0 oz/week     0 Standard drinks or equivalent per week      Comment: very rare    Drug use: No    Sexual activity: Not on file     Other Topics Concern    Not on file     Social History Narrative            Current Medications/Allergies     Current Outpatient Prescriptions   Medication Sig Dispense Refill    oxyCODONE-acetaminophen (PERCOCET 7.5) 7.5-325 mg per tablet Take 1 Tab by mouth every eight (8) hours as needed for Pain. Max Daily Amount: 3 Tabs. 42 Tab 0    diclofenac (VOLTAREN) 1 % gel Apply 2 g to affected area four (4) times daily. 100 g 0    cyclobenzaprine (FLEXERIL) 10 mg tablet Take 0.5 Tabs by mouth three (3) times daily as needed for Muscle Spasm(s). 15 Tab 0    losartan (COZAAR) 100 mg tablet Take 1 Tab by mouth daily. 30 Tab 5    amLODIPine (NORVASC) 10 mg tablet Take 1 Tab by mouth nightly. Indications: hypertension 30 Tab 5    albuterol (PROVENTIL HFA, VENTOLIN HFA, PROAIR HFA) 90 mcg/actuation inhaler Take 2 Puffs by inhalation every four (4) hours as needed for Wheezing.  1 Inhaler 5     No Known Allergies

## 2018-05-01 ENCOUNTER — OFFICE VISIT (OUTPATIENT)
Dept: FAMILY MEDICINE CLINIC | Age: 68
End: 2018-05-01

## 2018-05-01 VITALS
OXYGEN SATURATION: 97 % | BODY MASS INDEX: 26.58 KG/M2 | WEIGHT: 150 LBS | DIASTOLIC BLOOD PRESSURE: 83 MMHG | SYSTOLIC BLOOD PRESSURE: 157 MMHG | HEIGHT: 63 IN | RESPIRATION RATE: 17 BRPM | HEART RATE: 79 BPM | TEMPERATURE: 97.6 F

## 2018-05-01 DIAGNOSIS — S76.111D QUADRICEPS MUSCLE STRAIN, RIGHT, SUBSEQUENT ENCOUNTER: ICD-10-CM

## 2018-05-01 RX ORDER — OXYCODONE AND ACETAMINOPHEN 7.5; 325 MG/1; MG/1
1 TABLET ORAL
Qty: 42 TAB | Refills: 0 | Status: SHIPPED | OUTPATIENT
Start: 2018-05-01 | End: 2018-06-06 | Stop reason: DRUGHIGH

## 2018-05-01 NOTE — PROGRESS NOTES
Talisha Causey  79 y.o. female  1950  [address] [facility] [address]  [address] [place] [address] Dr: Progress Note  Sascha Almonte MD       Encounter Date: 5/1/2018    Chief Complaint   Patient presents with    Follow-up     documentation--release to go back to work     History of Present Illness   Talisha Causey is a 79 y.o. female who presents to clinic today for follow-up on right thigh pain. She has had improvement since her last visit and is able to ambulate without a cane or walker. Still with some pain when she first gets up and starts to move. Has been using prescribed pain medication for pain. Was cutting them in half to help extend them until she returned today. Review of Systems   Review of Systems   Musculoskeletal: Positive for myalgias. Negative for falls. Right thigh pain     Vitals/Objective:     Vitals:    05/01/18 0838   BP: 157/83   Pulse: 79   Resp: 17   Temp: 97.6 °F (36.4 °C)   TempSrc: Oral   SpO2: 97%   Weight: 150 lb (68 kg)   Height: 5' 3\" (1.6 m)     Body mass index is 26.57 kg/(m^2). Physical Exam   Constitutional: She is oriented to person, place, and time. Musculoskeletal:        Legs:  Neurological: She is alert and oriented to person, place, and time. She is not disoriented. She displays no tremor. Abnormal muscle tone: Gerneralized muscle wasting, but maintains 5/5 strength. Assessment and Plan:   1. Quadriceps muscle strain, right, subsequent encounter  Continue HEP. May return to work with reduced hours. Awaiting paperwork to complete. RTC in 4 weeks. Planning to start to wean off opioids. - oxyCODONE-acetaminophen (PERCOCET 7.5) 7.5-325 mg per tablet; Take 1 Tab by mouth every eight (8) hours as needed for Pain. Max Daily Amount: 3 Tabs. Dispense: 42 Tab; Refill: 0    I have discussed the diagnosis with the patient and the intended plan as seen in the above orders. she has expressed understanding.   The patient has received an after-visit summary and questions were answered concerning future plans. I have discussed medication side effects and warnings with the patient as well. Follow-up Disposition:  Return in about 4 weeks (around 5/29/2018). Electronically Signed: Janet Ribeiro MD     History   Patients past medical, surgical and family histories were reviewed and updated. Past Medical History:   Diagnosis Date    Diverticulosis 9/23/2015    Hard of hearing     Hypertension     Iron deficiency anemia     baseline 9.7 on 8/2015    PUD (peptic ulcer disease)     hospitalized at [facility], EGD showed a nonbleeding esophageal ulcer, multiple gastric ulcers    Pulmonary nodule 8/2/2015    CT chest: 2.8 cm focal airspace disease RUL, pna vs mass, referred to pulm with PET    Type 2 diabetes mellitus without complication, without long-term current use of insulin ([facility] 9/12/2015     Past Surgical History:   Procedure Laterality Date    HX MALIGNANT SKIN LESION EXCISION Right under  eye    HX PARTIAL HYSTERECTOMY Bilateral      Family History   Problem Relation Age of Onset    Diabetes Mother     Hypertension Mother     Alzheimer Mother     Pacemaker Mother     Heart Attack Father      Social History     Social History    Marital status:      Spouse name: N/A    Number of children: N/A    Years of education: N/A     Occupational History    Not on file.      Social History Main Topics    Smoking status: Former Smoker     Packs/day: 0.25     Years: 30.00     Quit date: 10/16/2016    Smokeless tobacco: Never Used    Alcohol use 0.0 oz/week     0 Standard drinks or equivalent per week      Comment: very rare    Drug use: No    Sexual activity: Not on file     Other Topics Concern    Not on file     Social History Narrative            Current Medications/Allergies     Current Outpatient Prescriptions   Medication Sig Dispense Refill    oxyCODONE-acetaminophen (PERCOCET 7.5) 7.5-325 mg per tablet Take 1 Tab by mouth every eight (8) hours as needed for Pain. Max Daily Amount: 3 Tabs. 42 Tab 0    diclofenac (VOLTAREN) 1 % gel Apply 2 g to affected area four (4) times daily. 100 g 0    cyclobenzaprine (FLEXERIL) 10 mg tablet Take 0.5 Tabs by mouth three (3) times daily as needed for Muscle Spasm(s). 15 Tab 0    losartan (COZAAR) 100 mg tablet Take 1 Tab by mouth daily. 30 Tab 5    amLODIPine (NORVASC) 10 mg tablet Take 1 Tab by mouth nightly. Indications: hypertension 30 Tab 5    albuterol (PROVENTIL HFA, VENTOLIN HFA, PROAIR HFA) 90 mcg/actuation inhaler Take 2 Puffs by inhalation every four (4) hours as needed for Wheezing.  1 Inhaler 5     No Known Allergies     `

## 2018-05-01 NOTE — MR AVS SNAPSHOT
[address] 
[phone] Patient: Stella Cr MRN: [id number] Visit Information Date & Time Provider Department Dept. Phone Encounter #  
 5/1/2018  8:40 AM Chin Rea MD 0047 [facility] [phone] [id number] Follow-up Instructions Return in about 4 weeks (around 5/29/2018). Upcoming Health Maintenance Date Due Hepatitis C Screening 1950 FOOT EXAM Q1 12/15/1960 EYE EXAM RETINAL OR DILATED Q1 12/15/1960 DTaP/Tdap/Td series (1 - Tdap) 12/15/1971 BREAST CANCER SCRN MAMMOGRAM 12/15/2000 ZOSTER VACCINE AGE 60> 10/15/2010 GLAUCOMA SCREENING Q2Y 12/15/2015 Bone Densitometry (Dexa) Screening 12/15/2015 Pneumococcal 65+ Low/Medium Risk (1 of 2 - PCV13) 12/15/2015 MEDICARE YEARLY EXAM 3/20/2018 HEMOGLOBIN A1C Q6M 3/28/2018 Influenza Age 5 to Adult 8/1/2018 MICROALBUMIN Q1 9/28/2018 LIPID PANEL Q1 9/28/2018 COLONOSCOPY 8/3/2025 Allergies as of 5/1/2018  Review Complete On: 5/1/2018 By: Fabio Ascencio LPN No Known Allergies Current Immunizations  Never Reviewed No immunizations on file. Not reviewed this visit You Were Diagnosed With   
  
 Codes Comments Quadriceps muscle strain, right, subsequent encounter     ICD-10-CM: S04.899G ICD-9-CM: V58.89, 843.8 Vitals BP Pulse Temp Resp Height(growth percentile) Weight(growth percentile) 157/83 79 97.6 °F (36.4 °C) (Oral) 17 5' 3\" (1.6 m) 150 lb (68 kg) SpO2 BMI OB Status Smoking Status 97% 26.57 kg/m2 Hysterectomy Former Smoker Vitals History BMI and BSA Data Body Mass Index Body Surface Area  
 26.57 kg/m 2 1.74 m 2 Preferred Pharmacy Pharmacy Name Phone 1941 [facility], [address] [facility] [address] [phone] Your Updated Medication List  
  
   
 This list is accurate as of 5/1/18  9:12 AM.  Always use your most recent med list.  
  
  
  
  
 albuterol 90 mcg/actuation inhaler Commonly known as:  PROVENTIL HFA, VENTOLIN HFA, PROAIR HFA Take 2 Puffs by inhalation every four (4) hours as needed for Wheezing. amLODIPine 10 mg tablet Commonly known as:  [place] Take 1 Tab by mouth nightly. Indications: hypertension  
  
 cyclobenzaprine 10 mg tablet Commonly known as:  FLEXERIL Take 0.5 Tabs by mouth three (3) times daily as needed for Muscle Spasm(s). diclofenac 1 % Gel Commonly known as:  VOLTAREN Apply 2 g to affected area four (4) times daily. losartan 100 mg tablet Commonly known as:  COZAAR Take 1 Tab by mouth daily. oxyCODONE-acetaminophen 7.5-325 mg per tablet Commonly known as:  PERCOCET 7.5 Take 1 Tab by mouth every eight (8) hours as needed for Pain. Max Daily Amount: 3 Tabs. Prescriptions Printed Refills  
 oxyCODONE-acetaminophen (PERCOCET 7.5) 7.5-325 mg per tablet 0 Sig: Take 1 Tab by mouth every eight (8) hours as needed for Pain. Max Daily Amount: 3 Tabs. Class: Print Route: Oral  
  
Follow-up Instructions Return in about 4 weeks (around 5/29/2018). Patient Instructions Quadricep Muscle Strain: Rehab Exercises Your Care Instructions Here are some examples of typical rehabilitation exercises for your condition. Start each exercise slowly. Ease off the exercise if you start to have pain. Your doctor or physical therapist will tell you when you can start these exercises and which ones will work best for you. How to do the exercises Standing quadriceps stretch 1. If you are not steady on your feet, hold on to a chair, counter, or wall. 2. Bend the knee of the leg you want to stretch, and reach behind you to grab the front of your foot or ankle with the hand on the same side.  For example, if you are stretching your right leg, use your right hand. 3. Keeping your knees next to each other, pull your foot toward your buttock until you feel a gentle stretch across the front of your hip and down the front of your thigh. Your knee should be pointed directly to the ground, and not out to the side. 4. Hold the stretch for at least 15 to 30 seconds. 5. Repeat 2 to 4 times. Quadricep and hip flexor stretch (lying on side) 1. Lie on your side with your good leg flat on the floor and your hand supporting your head. 2. Bend your top leg, and reach behind you to grab the front of that foot or ankle with your other hand. 3. Stretch your leg back by pulling your foot toward your buttock. You will feel the stretch in the front of your thigh. If this causes stress on your knee, do not do this stretch. 4. Hold the stretch for at least 15 to 30 seconds. 5. Repeat 2 to 4 times. Hamstring stretch (lying down) 1. Lie flat on your back with your legs straight. If you feel discomfort in your back, place a small towel roll under your lower back. 2. Holding the back of your affected leg for support, lift your leg straight up and toward your body until you feel a stretch at the back of your thigh. 3. Hold the stretch for at least 30 seconds. 4. Repeat 2 to 4 times. Follow-up care is a key part of your treatment and safety. Be sure to make and go to all appointments, and call your doctor if you are having problems. It's also a good idea to know your test results and keep a list of the medicines you take. Where can you learn more? Go to http://katerin-celina.info/. Enter J353 in the search box to learn more about \"Quadricep Muscle Strain: Rehab Exercises. \" Current as of: March 21, 2017 Content Version: 11.4 © 5175-1661 Healthwise, Incorporated.  Care instructions adapted under license by [employer] (which disclaims liability or warranty for this information). If you have questions about a medical condition or this instruction, always ask your healthcare professional. [place] [address] any warranty or liability for your use of this information. Introducing hospitals & [facility] SERVICES! [employer] introduces [employer] patient portal. Now you can access parts of your medical record, email your doctor's office, and request medication refills online. 1. In your internet browser, go to [email or web address] 2. Click on the First Time User? Click Here link in the Sign In box. You will see the New Member Sign Up page. 3. Enter your [employer] Access Code exactly as it appears below. You will not need to use this code after youve completed the sign-up process. If you do not sign up before the expiration date, you must request a new code. · [employer] Access Code: [email or web address] Expires: 5/23/2018  8:41 PM 
 
4. Enter the last four digits of your Social Security Number (xxxx) and Date of Birth (mm/dd/yyyy) as indicated and click Submit. You will be taken to the next sign-up page. 5. Create a [employer] ID. This will be your [employer] login ID and cannot be changed, so think of one that is secure and easy to remember. 6. Create a [employer] password. You can change your password at any time. 7. Enter your Password Reset Question and Answer. This can be used at a later time if you forget your password. 8. Enter your e-mail address. You will receive e-mail notification when new information is available in [address] 9. Click Sign Up. You can now view and download portions of your medical record. 10. Click the Download Summary menu link to download a portable copy of your medical information. If you have questions, please visit the Frequently Asked Questions section of the [employer] website. Remember, [employer] is NOT to be used for urgent needs. For medical emergencies, dial 911. Now available from your iPhone and Android! Please provide this summary of care documentation to your next provider. Your primary care clinician is listed as Julia León. If you have any questions after today's visit, please call [phone].

## 2018-05-01 NOTE — PROGRESS NOTES
Chief Complaint   Patient presents with    Follow-up     documentation--release to go back to work     1. Have you been to the ER, urgent care clinic since your last visit? Hospitalized since your last visit? No    2. Have you seen or consulted any other health care providers outside of the [address] [place] [address] since your last visit? Include any pap smears or colon screening. No    Leg is feeling better    She is doing a lot of walking/exercises for leg.

## 2018-05-01 NOTE — PATIENT INSTRUCTIONS
Quadricep Muscle Strain: Rehab Exercises  Your Care Instructions  Here are some examples of typical rehabilitation exercises for your condition. Start each exercise slowly. Ease off the exercise if you start to have pain. Your doctor or physical therapist will tell you when you can start these exercises and which ones will work best for you. How to do the exercises  Standing quadriceps stretch    1. If you are not steady on your feet, hold on to a chair, counter, or wall. 2. Bend the knee of the leg you want to stretch, and reach behind you to grab the front of your foot or ankle with the hand on the same side. For example, if you are stretching your right leg, use your right hand. 3. Keeping your knees next to each other, pull your foot toward your buttock until you feel a gentle stretch across the front of your hip and down the front of your thigh. Your knee should be pointed directly to the ground, and not out to the side. 4. Hold the stretch for at least 15 to 30 seconds. 5. Repeat 2 to 4 times. Quadricep and hip flexor stretch (lying on side)    1. Lie on your side with your good leg flat on the floor and your hand supporting your head. 2. Bend your top leg, and reach behind you to grab the front of that foot or ankle with your other hand. 3. Stretch your leg back by pulling your foot toward your buttock. You will feel the stretch in the front of your thigh. If this causes stress on your knee, do not do this stretch. 4. Hold the stretch for at least 15 to 30 seconds. 5. Repeat 2 to 4 times. Hamstring stretch (lying down)    1. Lie flat on your back with your legs straight. If you feel discomfort in your back, place a small towel roll under your lower back. 2. Holding the back of your affected leg for support, lift your leg straight up and toward your body until you feel a stretch at the back of your thigh. 3. Hold the stretch for at least 30 seconds. 4. Repeat 2 to 4 times.   Follow-up care is a key part of your treatment and safety. Be sure to make and go to all appointments, and call your doctor if you are having problems. It's also a good idea to know your test results and keep a list of the medicines you take. Where can you learn more? Go to http://katerin-celina.info/. Enter L401 in the search box to learn more about \"Quadricep Muscle Strain: Rehab Exercises. \"  Current as of: March 21, 2017  Content Version: 11.4  © 5866-4397 [employer]. Care instructions adapted under license by [employer] (which disclaims liability or warranty for this information). If you have questions about a medical condition or this instruction, always ask your healthcare professional. [address] any warranty or liability for your use of this information.

## 2018-05-10 ENCOUNTER — TELEPHONE (OUTPATIENT)
Dept: FAMILY MEDICINE CLINIC | Age: 68
End: 2018-05-10

## 2018-05-10 NOTE — TELEPHONE ENCOUNTER
Patients daughter called stating she had paperwork faxed to the office and was intercepted by [facility] and a not was attached to it for Dr. Matthias Costello to please fill out this form today when he gets into the office. She stated she has been trying to get this filled out for a while now and I was originally due on 5/1/18 they have had to get an extension for the form but it is to be turned in today. She was informed when the paperwork was intercepted that Dr. Matthias Costello would be in office today. She left on the note that was attacehed to the form what was needed but wanted me to also include it here. Patient needs the paperwork to reflect that she was seen on 5/1/18 at our office and clear her to be out of work from 4/26/18 through 5/13/18. She will return to work on 5/14/18 and is able to work a 4-6 hour shift. Please contact when ready for pickup.

## 2018-05-23 ENCOUNTER — TELEPHONE (OUTPATIENT)
Dept: FAMILY MEDICINE CLINIC | Age: 68
End: 2018-05-23

## 2018-05-23 NOTE — TELEPHONE ENCOUNTER
This message is to inform you we have attempted to reach out to your patient several times and have not been able to make contact and the patient has not responded to our messages. It is up to you to determine if you or your nurse needs to attempt to contact the patient to discuss the importance of an appointment with the specialist.  If you speak with the patient please make them aware they need to contact the office with appointment information including the date, time, doctors first and last name and the phone number to that office as soon as an appointment is made. If you receive appointment information please add to this telephone encounter and include all of the previously discussed information and route this message back to me. Please do not place a new order! I can reopen and edit the existing order.   Physical Therapy Order

## 2018-06-06 ENCOUNTER — OFFICE VISIT (OUTPATIENT)
Dept: FAMILY MEDICINE CLINIC | Age: 68
End: 2018-06-06

## 2018-06-06 VITALS
RESPIRATION RATE: 16 BRPM | HEART RATE: 74 BPM | WEIGHT: 152 LBS | OXYGEN SATURATION: 95 % | DIASTOLIC BLOOD PRESSURE: 81 MMHG | SYSTOLIC BLOOD PRESSURE: 147 MMHG | TEMPERATURE: 98.6 F | HEIGHT: 63 IN | BODY MASS INDEX: 26.93 KG/M2

## 2018-06-06 DIAGNOSIS — S76.111D QUADRICEPS MUSCLE STRAIN, RIGHT, SUBSEQUENT ENCOUNTER: ICD-10-CM

## 2018-06-06 RX ORDER — OXYCODONE AND ACETAMINOPHEN 5; 325 MG/1; MG/1
1 TABLET ORAL
Qty: 42 TAB | Refills: 0 | Status: SHIPPED | OUTPATIENT
Start: 2018-06-06 | End: 2018-10-16 | Stop reason: ALTCHOICE

## 2018-06-06 NOTE — PATIENT INSTRUCTIONS
Stretching: Exercises  Your Care Instructions  Here are some examples of exercises for stretching. Start each exercise slowly. Ease off the exercise if you start to have pain. Your doctor or physical therapist will tell you when you can start these exercises and which ones will work best for you. How to do the exercises  Latissimus stretch    1. Stand with your back straight and your feet shoulder-width apart. You can do this stretch sitting down if you are not steady on your feet. 2. Hold your arms above your head, and hold one hand with the other. 3. Pull upward while leaning straight over toward your right side. Keep your lower body straight. You should feel the stretch along your left side. 4. Hold 15 to 30 seconds, and then switch sides. 5. Repeat 2 to 4 times for each side. Triceps stretch    1. Stand with your back straight and your feet shoulder-width apart. You can do this stretch sitting down if you are not steady on your feet. 2. Bring your left elbow straight up while bending your arm. 3. Grab your left elbow with your right hand, and pull your left elbow toward your head with light pressure. If you are more flexible, you may pull your arm slightly behind your head. You will feel the stretch along the back of your arm. 4. Hold 15 to 30 seconds, and then switch elbows. 5. Repeat 2 to 4 times for each arm. Calf stretch    1. Place your hands on a wall for balance. You can also do this with your hands on the back of a chair, a countertop, or a tree. 2. Step back with your left leg. Keep the leg straight, and press your left heel into the floor. 3. Press your hips forward, bending your right leg slightly. You will feel the stretch in your left calf. 4. Hold the stretch 15 to 30 seconds. 5. Repeat 2 to 4 times for each leg. Quadriceps stretch    1. Lie on your side with one hand supporting your head. 2. Bend your upper leg back and grab your ankle with your other hand.   3. Stretch your leg back by pulling your foot toward your buttocks. You will feel the stretch in the front of your thigh. If this causes stress on your knees, do not do this stretch. 4. Hold the stretch 15 to 30 seconds. 5. Repeat 2 to 4 times for each leg. Groin stretch    1. Sit on the floor and put the soles of your feet together. Do not slump your back. 2. Grab your ankles and gently pull your legs toward you. 3. Press your knees toward the floor. You will feel the stretch in your inner thighs. 4. Hold 15 to 30 seconds. 5. Repeat 2 to 4 times. Hamstring stretch in doorway    1. Lie on the floor near a doorway, with your buttocks close to the wall. 2. Let the leg you are not stretching extend through the doorway. 3. Put the leg you want to stretch up on the wall, and straighten your knee to feel a gentle stretch at the back of your leg. 4. Hold the stretch for at least 15 to 30 seconds. Repeat 2 to 4 times. Follow-up care is a key part of your treatment and safety. Be sure to make and go to all appointments, and call your doctor if you are having problems. It's also a good idea to know your test results and keep a list of the medicines you take. Where can you learn more? Go to http://katerin-celina.info/. Enter 780 [id number] in the search box to learn more about \"Stretching: Exercises. \"  Current as of: March 13, 2017  Content Version: 11.4  © 8530-6000 [employer]. Care instructions adapted under license by [employer] (which disclaims liability or warranty for this information). If you have questions about a medical condition or this instruction, always ask your healthcare professional. [place] [address] any warranty or liability for your use of this information. Low Back Pain: Exercises  Your Care Instructions  Here are some examples of typical rehabilitation exercises for your condition. Start each exercise slowly.  Ease off the exercise if you start to have pain.  Your doctor or physical therapist will tell you when you can start these exercises and which ones will work best for you. How to do the exercises  Press-up    6. Lie on your stomach, supporting your body with your forearms. 7. Press your elbows down into the floor to raise your upper back. As you do this, relax your stomach muscles and allow your back to arch without using your back muscles. As your press up, do not let your hips or pelvis come off the floor. 8. Hold for 15 to 30 seconds, then relax. 9. Repeat 2 to 4 times. Alternate arm and leg (bird dog) exercise    Do this exercise slowly. Try to keep your body straight at all times, and do not let one hip drop lower than the other. 6. Start on the floor, on your hands and knees. 7. Tighten your belly muscles. 8. Raise one leg off the floor, and hold it straight out behind you. Be careful not to let your hip drop down, because that will twist your trunk. 9. Hold for about 6 seconds, then lower your leg and switch to the other leg. 10. Repeat 8 to 12 times on each leg. 11. Over time, work up to holding for 10 to 30 seconds each time. 12. If you feel stable and secure with your leg raised, try raising the opposite arm straight out in front of you at the same time. Knee-to-chest exercise    6. Lie on your back with your knees bent and your feet flat on the floor. 7. Bring one knee to your chest, keeping the other foot flat on the floor (or keeping the other leg straight, whichever feels better on your lower back). 8. Keep your lower back pressed to the floor. Hold for at least 15 to 30 seconds. 9. Relax, and lower the knee to the starting position. 10. Repeat with the other leg. Repeat 2 to 4 times with each leg. 11. To get more stretch, put your other leg flat on the floor while pulling your knee to your chest.  Curl-ups    6. Lie on the floor on your back with your knees bent at a 90-degree angle.  Your feet should be flat on the floor, about 12 inches from your buttocks. 7. Cross your arms over your chest. If this bothers your neck, try putting your hands behind your neck (not your head), with your elbows spread apart. 8. Slowly tighten your belly muscles and raise your shoulder blades off the floor. 9. Keep your head in line with your body, and do not press your chin to your chest.  10. Hold this position for 1 or 2 seconds, then slowly lower yourself back down to the floor. 11. Repeat 8 to 12 times. Pelvic tilt exercise    6. Lie on your back with your knees bent. 7. \"Brace\" your stomach. This means to tighten your muscles by pulling in and imagining your belly button moving toward your spine. You should feel like your back is pressing to the floor and your hips and pelvis are rocking back. 8. Hold for about 6 seconds while you breathe smoothly. 9. Repeat 8 to 12 times. Heel dig bridging    5. Lie on your back with both knees bent and your ankles bent so that only your heels are digging into the floor. Your knees should be bent about 90 degrees. 6. Then push your heels into the floor, squeeze your buttocks, and lift your hips off the floor until your shoulders, hips, and knees are all in a straight line. 7. Hold for about 6 seconds as you continue to breathe normally, and then slowly lower your hips back down to the floor and rest for up to 10 seconds. 8. Do 8 to 12 repetitions. Hamstring stretch in doorway    1. Lie on your back in a doorway, with one leg through the open door. 2. Slide your leg up the wall to straighten your knee. You should feel a gentle stretch down the back of your leg. 3. Hold the stretch for at least 15 to 30 seconds. Do not arch your back, point your toes, or bend either knee. Keep one heel touching the floor and the other heel touching the wall. 4. Repeat with your other leg. 5. Do 2 to 4 times for each leg. Hip flexor stretch    1. Kneel on the floor with one knee bent and one leg behind you.  Place your forward knee over your foot. Keep your other knee touching the floor. 2. Slowly push your hips forward until you feel a stretch in the upper thigh of your rear leg. 3. Hold the stretch for at least 15 to 30 seconds. Repeat with your other leg. 4. Do 2 to 4 times on each side. Wall sit    1. Stand with your back 10 to 12 inches away from a wall. 2. Lean into the wall until your back is flat against it. 3. Slowly slide down until your knees are slightly bent, pressing your lower back into the wall. 4. Hold for about 6 seconds, then slide back up the wall. 5. Repeat 8 to 12 times. Follow-up care is a key part of your treatment and safety. Be sure to make and go to all appointments, and call your doctor if you are having problems. It's also a good idea to know your test results and keep a list of the medicines you take. Where can you learn more? Go to http://katerinPGA TOUR Superstorecelina.info/. Enter M557 in the search box to learn more about \"Low Back Pain: Exercises. \"  Current as of: March 21, 2017  Content Version: 11.4  © 2268-1123 [employer]. Care instructions adapted under license by [employer] (which disclaims liability or warranty for this information). If you have questions about a medical condition or this instruction, always ask your healthcare professional. [address] any warranty or liability for your use of this information. Back Care and Preventing Injuries: Care Instructions  Your Care Instructions    You can hurt your back doing many everyday activities: lifting a heavy box, bending down to garden, exercising at the gym, and even getting out of bed. But you can keep your back strong and healthy by doing some exercises. You also can follow a few tips for sitting, sleeping, and lifting to avoid hurting your back again.   Talk to your doctor before you start an exercise program. Ask for help if you want to learn more about keeping your back healthy. Follow-up care is a key part of your treatment and safety. Be sure to make and go to all appointments, and call your doctor if you are having problems. It's also a good idea to know your test results and keep a list of the medicines you take. How can you care for yourself at home? · Stay at a healthy weight to avoid strain on your lower back. · Do not smoke. Smoking increases the risk of osteoporosis, which weakens the spine. If you need help quitting, talk to your doctor about stop-smoking programs and medicines. These can increase your chances of quitting for good. · Make sure you sleep in a position that maintains your back's normal curves and on a mattress that feels comfortable. Sleep on your side with a pillow between your knees, or sleep on your back with a pillow under your knees. These positions can reduce strain on your back. · When you get out of bed, lie on your side and bend both knees. Drop your feet over the edge of the bed as you push up with both arms. Scoot to the edge of the bed. Make sure your feet are in line with your rear end (buttocks), and then stand up. · If you must stand for a long time, put one foot on a stool, ledge, or box. Exercise to strengthen your back and other muscles  · Get at least 30 minutes of exercise on most days of the week. Walking is a good choice. You also may want to do other activities, such as running, swimming, cycling, or playing tennis or team sports. · Stretch your back muscles. Here are few exercises to try:  B[place] Needle on your back with your knees bent and your feet flat on the floor. Gently pull one bent knee to your chest. Put that foot back on the floor, and then pull the other knee to your chest. Hold for 15 to 30 seconds. Repeat 2 to 4 times. ¨ Do pelvic tilts. Lie on your back with your knees bent. Tighten your stomach muscles. Pull your belly button (navel) in and up toward your ribs.  You should feel like your back is pressing to the floor and your hips and pelvis are slightly lifting off the floor. Hold for 6 seconds while breathing smoothly. · Keep your core muscles strong. The muscles of your back, belly (abdomen), and buttocks support your spine. ¨ Pull in your belly, and imagine pulling your navel toward your spine. Hold this for 6 seconds, then relax. Remember to keep breathing normally as you tense your muscles. ¨ Do curl-ups. Always do them with your knees bent. Keep your low back on the floor, and curl your shoulders toward your knees using a smooth, slow motion. Keep your arms folded across your chest. If this bothers your neck, try putting your hands behind your neck (not your head), with your elbows spread apart. ¨ Lie on your back with your knees bent and your feet flat on the floor. Tighten your belly muscles, and then push with your feet and raise your buttocks up a few inches. Hold this position 6 seconds as you continue to breathe normally, then lower yourself slowly to the floor. Repeat 8 to 12 times. ¨ If you like group exercise, try Pilates or yoga. These classes have poses that strengthen the core muscles. Protect your back when you sit  · Place a small pillow, a rolled-up towel, or a lumbar roll in the curve of your back if you need extra support. · Sit in a chair that is low enough to let you place both feet flat on the floor with both knees nearly level with your hips. If your chair or desk is too high, use a foot rest to raise your knees. · When driving, keep your knees nearly level with your hips. Sit straight, and drive with both hands on the steering wheel. Your arms should be in a slightly bent position. · Try a kneeling chair, which helps tilt your hips forward. This takes pressure off your lower back. · Try sitting on an exercise ball. It can rock from side to side, which helps keep your back loose. Lift properly  · Squat down, bending at the hips and knees only.  If you need to, put one knee to the floor and extend your other knee in front of you, bent at a right angle (half kneeling). · Press your chest straight forward. This helps keep your upper back straight while keeping a slight arch in your low back. · Hold the load as close to your body as possible, at the level of your navel. · Use your feet to change direction, taking small steps. · Lead with your hips as you change direction. Keep your shoulders in line with your hips as you move. Do not twist your body. · Set down your load carefully, squatting with your knees and hips only. When should you call for help? Watch closely for changes in your health, and be sure to contact your doctor if you have any problems. Where can you learn more? Go to http://katerin-celina.info/. Enter S810 in the search box to learn more about \"Back Care and Preventing Injuries: Care Instructions. \"  Current as of: March 21, 2017  Content Version: 11.4  © 4143-1326 "[employer] Care instructions adapted under license by [employer] (which disclaims liability or warranty for this information). If you have questions about a medical condition or this instruction, always ask your healthcare professional. [address] any warranty or liability for your use of this information.

## 2018-06-06 NOTE — PROGRESS NOTES
1. Have you been to the ER, urgent care clinic since your last visit? Hospitalized since your last visit? No    2. Have you seen or consulted any other health care providers outside of the [address] [place] [address] since your last visit? Include any pap smears or colon screening. No      Chief Complaint   Patient presents with    Follow-up     5/1/18 appointment, muscle strain     Patient states after four hours of work she has some back pain. Blood pressure 147/81, pulse 74, temperature 98.6 °F (37 °C), temperature source Oral, resp. rate 16, height 5' 3\" (1.6 m), weight 152 lb (68.9 kg), SpO2 95 %.

## 2018-06-06 NOTE — PROGRESS NOTES
Yasemin Chris  79 y.o. female  1950  [address] [facility] [id number]  [address] [place] [address] Dr: Progress Note  Donald Angel MD       Encounter Date: 6/6/2018    Chief Complaint   Patient presents with    Follow-up     5/1/18 appointment, muscle strain     History of Present Illness   Yasemin Chris is a 79 y.o. female who presents to clinic today for follow-up on her quadricep strain. At this time she doesn't have pain at rest, just \"body just aches. \"  Is taking medication regularly. She when does go to work, she doesn't use any supportive device for ambulation. Can work approximately 4 hours, however beyond this is difficult. Notes worsening thigh and knee pain the longer she is on it. She is taking percocet for pain and using voltaren and flexeril as needed. Also taking OTC analgestics with minimal help. Review of Systems   Review of Systems - Musculoskeletal ROS: Thigh pain  Vitals/Objective:     Vitals:    06/06/18 1606   BP: 147/81   Pulse: 74   Resp: 16   Temp: 98.6 °F (37 °C)   TempSrc: Oral   SpO2: 95%   Weight: 152 lb (68.9 kg)   Height: 5' 3\" (1.6 m)     Body mass index is 26.93 kg/(m^2). General: Patient alert and oriented and in NAD  HEENT: PER/EOMI, no conjunctival pallor or scleral icterus. No thyromegaly or cervical lymphadenopathy  Heart: Regular rate and rhythm, No murmurs, rubs or gallops. 2+ peripheral pulses  Lungs: Clear to auscultattion bilaterally  MSK: Posterior and innter thigh muscularorue     Assessment and Plan:   1. Quadriceps muscle strain, right, subsequent encounter  This is a chronic problem that is improving and we are weaning as able. She can return to work for 4 hours at a time. Would avoid longer shifts as these exacerbate her pain. With additional time, she may be able to return to a full 8 hours.     Per review of available records and patients , there are not sign of overuse, misuse, diversion, or concerning side effects. Today we reviewed: the risk of overdose, addiction, and dependency the risks and benefits of continuing with a narcotic based pain regimen  The following changes were made to the patients current treatment plan: nothing, medications refilled. - oxyCODONE-acetaminophen (PERCOCET) 5-325 mg per tablet; Take 1 Tab by mouth every eight (8) hours as needed for Pain. Max Daily Amount: 3 Tabs. Dispense: 43    I have discussed the diagnosis with the patient and the intended plan as seen in the above orders. she has expressed understanding. The patient has received an after-visit summary and questions were answered concerning future plans. I have discussed medication side effects and warnings with the patient as well. Follow-up Disposition:  Return in about 4 weeks (around 7/4/2018). Electronically Signed: Bill Gee MD     History   Patients past medical, surgical and family histories were reviewed and updated.     Past Medical History:   Diagnosis Date    Diverticulosis 9/23/2015    Hard of hearing     Hypertension     Iron deficiency anemia     baseline 9.7 on 8/2015    PUD (peptic ulcer disease)     hospitalized at [facility], EGD showed a nonbleeding esophageal ulcer, multiple gastric ulcers    Pulmonary nodule 8/2/2015    CT chest: 2.8 cm focal airspace disease RUL, pna vs mass, referred to pulm with PET    Type 2 diabetes mellitus without complication, without long-term current use of insulin ([facility] [address] 9/12/2015     Past Surgical History:   Procedure Laterality Date    HX MALIGNANT SKIN LESION EXCISION Right under  eye    HX PARTIAL HYSTERECTOMY Bilateral      Family History   Problem Relation Age of Onset    Diabetes Mother     Hypertension Mother     Alzheimer Mother     Pacemaker Mother     Heart Attack Father      Social History     Social History    Marital status:      Spouse name: N/A    Number of children: N/A    Years of education: N/A Occupational History    Not on file. Social History Main Topics    Smoking status: Former Smoker     Packs/day: 0.25     Years: 30.00     Quit date: 10/16/2016    Smokeless tobacco: Never Used    Alcohol use 0.0 oz/week     0 Standard drinks or equivalent per week      Comment: very rare    Drug use: No    Sexual activity: Not on file     Other Topics Concern    Not on file     Social History Narrative            Current Medications/Allergies     Current Outpatient Prescriptions   Medication Sig Dispense Refill    oxyCODONE-acetaminophen (PERCOCET) 5-325 mg per tablet Take 1 Tab by mouth every eight (8) hours as needed for Pain. Max Daily Amount: 3 Tabs. 42 Tab 0    losartan (COZAAR) 100 mg tablet Take 1 Tab by mouth daily. 30 Tab 5    amLODIPine (NORVASC) 10 mg tablet Take 1 Tab by mouth nightly. Indications: hypertension 30 Tab 5    diclofenac (VOLTAREN) 1 % gel Apply 2 g to affected area four (4) times daily. 100 g 0    cyclobenzaprine (FLEXERIL) 10 mg tablet Take 0.5 Tabs by mouth three (3) times daily as needed for Muscle Spasm(s). 15 Tab 0    albuterol (PROVENTIL HFA, VENTOLIN HFA, PROAIR HFA) 90 mcg/actuation inhaler Take 2 Puffs by inhalation every four (4) hours as needed for Wheezing.  1 Inhaler 5     No Known Allergies

## 2018-06-06 NOTE — MR AVS SNAPSHOT
[address] 
[phone] Patient: Oscar Humphreys MRN: [id number] [facility]:96/[id number]/[id number] Visit Information Date & Time Provider Department Dept. Phone Encounter #  
 6/6/2018  4:00 PM Jeny Hooks, [address] [facility] [phone] Follow-up Instructions Return in about 4 weeks (around 7/4/2018). Upcoming Health Maintenance Date Due Hepatitis C Screening 1950 FOOT EXAM Q1 12/15/1960 EYE EXAM RETINAL OR DILATED Q1 12/15/1960 DTaP/Tdap/Td series (1 - Tdap) 12/15/1971 BREAST CANCER SCRN MAMMOGRAM 12/15/2000 ZOSTER VACCINE AGE 60> 10/15/2010 GLAUCOMA SCREENING Q2Y 12/15/2015 Bone Densitometry (Dexa) Screening 12/15/2015 Pneumococcal 65+ Low/Medium Risk (1 of 2 - PCV13) 12/15/2015 MEDICARE YEARLY EXAM 3/20/2018 HEMOGLOBIN A1C Q6M 3/28/2018 Influenza Age 5 to Adult 8/1/2018 MICROALBUMIN Q1 9/28/2018 LIPID PANEL Q1 9/28/2018 COLONOSCOPY 8/3/2025 Allergies as of 6/6/2018  Review Complete On: 5/1/2018 By: Nirali Jones LPN No Known Allergies Current Immunizations  Reviewed on 6/6/2018 No immunizations on file. Reviewed by Kris Lora LPN on 6/9/1973 at  3:57 PM  
You Were Diagnosed With   
  
 Codes Comments Quadriceps muscle strain, right, subsequent encounter     ICD-10-CM: I09.463A ICD-9-CM: V58.89, 843.8 Vitals BP Pulse Temp Resp Height(growth percentile) Weight(growth percentile) 147/81 74 98.6 °F (37 °C) (Oral) 16 5' 3\" (1.6 m) 152 lb (68.9 kg) SpO2 BMI OB Status Smoking Status 95% 26.93 kg/m2 Hysterectomy Former Smoker BMI and BSA Data Body Mass Index Body Surface Area  
 26.93 kg/m 2 1.75 m 2 Preferred Pharmacy Pharmacy Name Phone [address] [facility], [address] [place] [facility] [phone] Your Updated Medication List  
  
   
This list is accurate as of 6/6/18  4:37 PM.  Always use your most recent med list.  
  
  
  
  
 albuterol 90 mcg/actuation inhaler Commonly known as:  PROVENTIL HFA, VENTOLIN HFA, PROAIR HFA Take 2 Puffs by inhalation every four (4) hours as needed for Wheezing. amLODIPine 10 mg tablet Commonly known as:  Romel Agustin Take 1 Tab by mouth nightly. Indications: hypertension  
  
 cyclobenzaprine 10 mg tablet Commonly known as:  FLEXERIL Take 0.5 Tabs by mouth three (3) times daily as needed for Muscle Spasm(s). diclofenac 1 % Gel Commonly known as:  VOLTAREN Apply 2 g to affected area four (4) times daily. losartan 100 mg tablet Commonly known as:  COZAAR Take 1 Tab by mouth daily. oxyCODONE-acetaminophen 5-325 mg per tablet Commonly known as:  PERCOCET Take 1 Tab by mouth every eight (8) hours as needed for Pain. Max Daily Amount: 3 Tabs. Prescriptions Printed Refills  
 oxyCODONE-acetaminophen (PERCOCET) 5-325 mg per tablet 0 Sig: Take 1 Tab by mouth every eight (8) hours as needed for Pain. Max Daily Amount: 3 Tabs. Class: Print Route: Oral  
  
Follow-up Instructions Return in about 4 weeks (around 7/4/2018). Patient Instructions Stretching: Exercises Your Care Instructions Here are some examples of exercises for stretching. Start each exercise slowly. Ease off the exercise if you start to have pain. Your doctor or physical therapist will tell you when you can start these exercises and which ones will work best for you. How to do the exercises Latissimus stretch 1. Stand with your back straight and your feet shoulder-width apart. You can do this stretch sitting down if you are not steady on your feet. 2. Hold your arms above your head, and hold one hand with the other. 3. Pull upward while leaning straight over toward your right side.  Keep your lower body straight. You should feel the stretch along your left side. 4. Hold 15 to 30 seconds, and then switch sides. 5. Repeat 2 to 4 times for each side. Triceps stretch 1. Stand with your back straight and your feet shoulder-width apart. You can do this stretch sitting down if you are not steady on your feet. 2. Bring your left elbow straight up while bending your arm. 3. Grab your left elbow with your right hand, and pull your left elbow toward your head with light pressure. If you are more flexible, you may pull your arm slightly behind your head. You will feel the stretch along the back of your arm. 4. Hold 15 to 30 seconds, and then switch elbows. 5. Repeat 2 to 4 times for each arm. Calf stretch 1. Place your hands on a wall for balance. You can also do this with your hands on the back of a chair, a countertop, or a tree. 2. Step back with your left leg. Keep the leg straight, and press your left heel into the floor. 3. Press your hips forward, bending your right leg slightly. You will feel the stretch in your left calf. 4. Hold the stretch 15 to 30 seconds. 5. Repeat 2 to 4 times for each leg. Quadriceps stretch 1. Lie on your side with one hand supporting your head. 2. Bend your upper leg back and grab your ankle with your other hand. 3. Stretch your leg back by pulling your foot toward your buttocks. You will feel the stretch in the front of your thigh. If this causes stress on your knees, do not do this stretch. 4. Hold the stretch 15 to 30 seconds. 5. Repeat 2 to 4 times for each leg. Groin stretch 1. Sit on the floor and put the soles of your feet together. Do not slump your back. 2. Grab your ankles and gently pull your legs toward you. 3. Press your knees toward the floor. You will feel the stretch in your inner thighs. 4. Hold 15 to 30 seconds. 5. Repeat 2 to 4 times. Hamstring stretch in doorway 1. Lie on the floor near a doorway, with your buttocks close to the wall. 2. Let the leg you are not stretching extend through the doorway. 3. Put the leg you want to stretch up on the wall, and straighten your knee to feel a gentle stretch at the back of your leg. 4. Hold the stretch for at least 15 to 30 seconds. Repeat 2 to 4 times. Follow-up care is a key part of your treatment and safety. Be sure to make and go to all appointments, and call your doctor if you are having problems. It's also a good idea to know your test results and keep a list of the medicines you take. Where can you learn more? Go to http://katerin-celina.info/. Enter 780 [id number] in the search box to learn more about \"Stretching: Exercises. \" Current as of: March 13, 2017 Content Version: 11.4 © 9537-0999 [employer]. Care instructions adapted under license by [employer] (which disclaims liability or warranty for this information). If you have questions about a medical condition or this instruction, always ask your healthcare professional. [place] [address] any warranty or liability for your use of this information. Low Back Pain: Exercises Your Care Instructions Here are some examples of typical rehabilitation exercises for your condition. Start each exercise slowly. Ease off the exercise if you start to have pain. Your doctor or physical therapist will tell you when you can start these exercises and which ones will work best for you. How to do the exercises Press-up 6. Lie on your stomach, supporting your body with your forearms. 7. Press your elbows down into the floor to raise your upper back. As you do this, relax your stomach muscles and allow your back to arch without using your back muscles. As your press up, do not let your hips or pelvis come off the floor. 8. Hold for 15 to 30 seconds, then relax. 9. Repeat 2 to 4 times. Alternate arm and leg (bird dog) exercise Do this exercise slowly. Try to keep your body straight at all times, and do not let one hip drop lower than the other. 6. Start on the floor, on your hands and knees. 7. Tighten your belly muscles. 8. Raise one leg off the floor, and hold it straight out behind you. Be careful not to let your hip drop down, because that will twist your trunk. 9. Hold for about 6 seconds, then lower your leg and switch to the other leg. 10. Repeat 8 to 12 times on each leg. 11. Over time, work up to holding for 10 to 30 seconds each time. 12. If you feel stable and secure with your leg raised, try raising the opposite arm straight out in front of you at the same time. Knee-to-chest exercise 6. Lie on your back with your knees bent and your feet flat on the floor. 7. Bring one knee to your chest, keeping the other foot flat on the floor (or keeping the other leg straight, whichever feels better on your lower back). 8. Keep your lower back pressed to the floor. Hold for at least 15 to 30 seconds. 9. Relax, and lower the knee to the starting position. 10. Repeat with the other leg. Repeat 2 to 4 times with each leg. 11. To get more stretch, put your other leg flat on the floor while pulling your knee to your chest. 
Curl-ups 6. Lie on the floor on your back with your knees bent at a 90-degree angle. Your feet should be flat on the floor, about 12 inches from your buttocks. 7. Cross your arms over your chest. If this bothers your neck, try putting your hands behind your neck (not your head), with your elbows spread apart. 8. Slowly tighten your belly muscles and raise your shoulder blades off the floor. 9. Keep your head in line with your body, and do not press your chin to your chest. 
10. Hold this position for 1 or 2 seconds, then slowly lower yourself back down to the floor. 11. Repeat 8 to 12 times. Pelvic tilt exercise 6. Lie on your back with your knees bent. 7. \"Brace\" your stomach. This means to tighten your muscles by pulling in and imagining your belly button moving toward your spine. You should feel like your back is pressing to the floor and your hips and pelvis are rocking back. 8. Hold for about 6 seconds while you breathe smoothly. 9. Repeat 8 to 12 times. Heel dig bridging 5. Lie on your back with both knees bent and your ankles bent so that only your heels are digging into the floor. Your knees should be bent about 90 degrees. 6. Then push your heels into the floor, squeeze your buttocks, and lift your hips off the floor until your shoulders, hips, and knees are all in a straight line. 7. Hold for about 6 seconds as you continue to breathe normally, and then slowly lower your hips back down to the floor and rest for up to 10 seconds. 8. Do 8 to 12 repetitions. Hamstring stretch in doorway 1. Lie on your back in a doorway, with one leg through the open door. 2. Slide your leg up the wall to straighten your knee. You should feel a gentle stretch down the back of your leg. 3. Hold the stretch for at least 15 to 30 seconds. Do not arch your back, point your toes, or bend either knee. Keep one heel touching the floor and the other heel touching the wall. 4. Repeat with your other leg. 5. Do 2 to 4 times for each leg. Hip flexor stretch 1. Kneel on the floor with one knee bent and one leg behind you. Place your forward knee over your foot. Keep your other knee touching the floor. 2. Slowly push your hips forward until you feel a stretch in the upper thigh of your rear leg. 3. Hold the stretch for at least 15 to 30 seconds. Repeat with your other leg. 4. Do 2 to 4 times on each side. Wall sit 1. Stand with your back 10 to 12 inches away from a wall. 2. Lean into the wall until your back is flat against it.  
3. Slowly slide down until your knees are slightly bent, pressing your lower back into the wall. 4. Hold for about 6 seconds, then slide back up the wall. 5. Repeat 8 to 12 times. Follow-up care is a key part of your treatment and safety. Be sure to make and go to all appointments, and call your doctor if you are having problems. It's also a good idea to know your test results and keep a list of the medicines you take. Where can you learn more? Go to http://katerin-celina.info/. Enter Z622 in the search box to learn more about \"Low Back Pain: Exercises. \" Current as of: March 21, 2017 Content Version: 11.4 © 4562-5233 [employer]. Care instructions adapted under license by [employer] (which disclaims liability or warranty for this information). If you have questions about a medical condition or this instruction, always ask your healthcare professional. [address] any warranty or liability for your use of this information. Back Care and Preventing Injuries: Care Instructions Your Care Instructions You can hurt your back doing many everyday activities: lifting a heavy box, bending down to garden, exercising at the gym, and even getting out of bed. But you can keep your back strong and healthy by doing some exercises. You also can follow a few tips for sitting, sleeping, and lifting to avoid hurting your back again. Talk to your doctor before you start an exercise program. Ask for help if you want to learn more about keeping your back healthy. Follow-up care is a key part of your treatment and safety. Be sure to make and go to all appointments, and call your doctor if you are having problems. It's also a good idea to know your test results and keep a list of the medicines you take. How can you care for yourself at home? · Stay at a healthy weight to avoid strain on your lower back. · Do not smoke.  Smoking increases the risk of osteoporosis, which weakens the spine. If you need help quitting, talk to your doctor about stop-smoking programs and medicines. These can increase your chances of quitting for good. · Make sure you sleep in a position that maintains your back's normal curves and on a mattress that feels comfortable. Sleep on your side with a pillow between your knees, or sleep on your back with a pillow under your knees. These positions can reduce strain on your back. · When you get out of bed, lie on your side and bend both knees. Drop your feet over the edge of the bed as you push up with both arms. Scoot to the edge of the bed. Make sure your feet are in line with your rear end (buttocks), and then stand up. · If you must stand for a long time, put one foot on a stool, ledge, or box. Exercise to strengthen your back and other muscles · Get at least 30 minutes of exercise on most days of the week. Walking is a good choice. You also may want to do other activities, such as running, swimming, cycling, or playing tennis or team sports. · Stretch your back muscles. Here are few exercises to try: ¨ Lie on your back with your knees bent and your feet flat on the floor. Gently pull one bent knee to your chest. Put that foot back on the floor, and then pull the other knee to your chest. Hold for 15 to 30 seconds. Repeat 2 to 4 times. ¨ Do pelvic tilts. Lie on your back with your knees bent. Tighten your stomach muscles. Pull your belly button (navel) in and up toward your ribs. You should feel like your back is pressing to the floor and your hips and pelvis are slightly lifting off the floor. Hold for 6 seconds while breathing smoothly. · Keep your core muscles strong. The muscles of your back, belly (abdomen), and buttocks support your spine. ¨ Pull in your belly, and imagine pulling your navel toward your spine. Hold this for 6 seconds, then relax. Remember to keep breathing normally as you tense your muscles. ¨ Do curl-ups. Always do them with your knees bent. Keep your low back on the floor, and curl your shoulders toward your knees using a smooth, slow motion. Keep your arms folded across your chest. If this bothers your neck, try putting your hands behind your neck (not your head), with your elbows spread apart. ¨ Lie on your back with your knees bent and your feet flat on the floor. Tighten your belly muscles, and then push with your feet and raise your buttocks up a few inches. Hold this position 6 seconds as you continue to breathe normally, then lower yourself slowly to the floor. Repeat 8 to 12 times. ¨ If you like group exercise, try Pilates or yoga. These classes have poses that strengthen the core muscles. Protect your back when you sit · Place a small pillow, a rolled-up towel, or a lumbar roll in the curve of your back if you need extra support. · Sit in a chair that is low enough to let you place both feet flat on the floor with both knees nearly level with your hips. If your chair or desk is too high, use a foot rest to raise your knees. · When driving, keep your knees nearly level with your hips. Sit straight, and drive with both hands on the steering wheel. Your arms should be in a slightly bent position. · Try a kneeling chair, which helps tilt your hips forward. This takes pressure off your lower back. · Try sitting on an exercise ball. It can rock from side to side, which helps keep your back loose. Lift properly · Squat down, bending at the hips and knees only. If you need to, put one knee to the floor and extend your other knee in front of you, bent at a right angle (half kneeling). · Press your chest straight forward. This helps keep your upper back straight while keeping a slight arch in your low back. · Hold the load as close to your body as possible, at the level of your navel. · Use your feet to change direction, taking small steps. · Lead with your hips as you change direction. Keep your shoulders in line with your hips as you move. Do not twist your body. · Set down your load carefully, squatting with your knees and hips only. When should you call for help? Watch closely for changes in your health, and be sure to contact your doctor if you have any problems. Where can you learn more? Go to http://katerin-celina.info/. Enter S810 in the search box to learn more about \"Back Care and Preventing Injuries: Care Instructions. \" Current as of: March 21, 2017 Content Version: 11.4 © 2549-2064 [employer]. Care instructions adapted under license by [employer] (which disclaims liability or warranty for this information). If you have questions about a medical condition or this instruction, always ask your healthcare professional. [address] any warranty or liability for your use of this information. Introducing [facility]! [employer] introduces Concurrent Thinking patient portal. Now you can access parts of your medical record, email your doctor's office, and request medication refills online. 1. In your internet browser, go to [email or web address] 2. Click on the First Time User? Click Here link in the Sign In box. You will see the New Member Sign Up page. 3. Enter your Concurrent Thinking Access Code exactly as it appears below. You will not need to use this code after youve completed the sign-up process. If you do not sign up before the expiration date, you must request a new code. · Concurrent Thinking Access Code: [email or web address] Expires: 9/4/2018  4:34 PM 
 
4. Enter the last four digits of your Social Security Number (xxxx) and Date of Birth (mm/dd/yyyy) as indicated and click Submit. You will be taken to the next sign-up page. 5. Create a Concurrent Thinking ID. This will be your Concurrent Thinking login ID and cannot be changed, so think of one that is secure and easy to remember. 6. Create a Kannuu password. You can change your password at any time. 7. Enter your Password Reset Question and Answer. This can be used at a later time if you forget your password. 8. Enter your e-mail address. You will receive e-mail notification when new information is available in [address] 9. Click Sign Up. You can now view and download portions of your medical record. 10. Click the Download Summary menu link to download a portable copy of your medical information. If you have questions, please visit the Frequently Asked Questions section of the Kannuu website. Remember, Kannuu is NOT to be used for urgent needs. For medical emergencies, dial 911. Now available from your iPhone and Android! Please provide this summary of care documentation to your next provider. Your primary care clinician is listed as Vicky Gonzales. If you have any questions after today's visit, please call [phone].

## 2018-10-01 DIAGNOSIS — I10 ESSENTIAL HYPERTENSION: ICD-10-CM

## 2018-10-01 RX ORDER — LOSARTAN POTASSIUM 100 MG/1
100 TABLET ORAL DAILY
Qty: 30 TAB | Refills: 5 | Status: SHIPPED | OUTPATIENT
Start: 2018-10-01 | End: 2019-04-02 | Stop reason: SDUPTHER

## 2018-10-01 RX ORDER — AMLODIPINE BESYLATE 10 MG/1
10 TABLET ORAL
Qty: 30 TAB | Refills: 5 | Status: SHIPPED | OUTPATIENT
Start: 2018-10-01 | End: 2019-04-02 | Stop reason: SDUPTHER

## 2018-10-01 NOTE — TELEPHONE ENCOUNTER
Pt is all out of medication, please have Pharmacy send her a text when ready, she is hard of hearing and doesn't have MyChart.

## 2018-10-02 ENCOUNTER — TELEPHONE (OUTPATIENT)
Dept: FAMILY MEDICINE CLINIC | Age: 68
End: 2018-10-02

## 2018-10-16 ENCOUNTER — OFFICE VISIT (OUTPATIENT)
Dept: FAMILY MEDICINE CLINIC | Age: 68
End: 2018-10-16

## 2018-10-16 VITALS
WEIGHT: 158 LBS | OXYGEN SATURATION: 99 % | DIASTOLIC BLOOD PRESSURE: 77 MMHG | TEMPERATURE: 97.9 F | SYSTOLIC BLOOD PRESSURE: 135 MMHG | HEART RATE: 83 BPM | HEIGHT: 63 IN | BODY MASS INDEX: 28 KG/M2 | RESPIRATION RATE: 17 BRPM

## 2018-10-16 DIAGNOSIS — E11.21 TYPE 2 DIABETES WITH NEPHROPATHY (HCC): Primary | ICD-10-CM

## 2018-10-16 DIAGNOSIS — Z12.39 BREAST CANCER SCREENING: ICD-10-CM

## 2018-10-16 DIAGNOSIS — Z28.21 INFLUENZA VACCINATION DECLINED: ICD-10-CM

## 2018-10-16 DIAGNOSIS — Z11.59 ENCOUNTER FOR HEPATITIS C SCREENING TEST FOR LOW RISK PATIENT: ICD-10-CM

## 2018-10-16 DIAGNOSIS — Z23 ENCOUNTER FOR IMMUNIZATION: ICD-10-CM

## 2018-10-16 DIAGNOSIS — I10 ESSENTIAL HYPERTENSION: ICD-10-CM

## 2018-10-16 LAB
ALBUMIN UR QL STRIP: 10 MG/L
CREATININE, URINE POC: 100 MG/DL
MICROALBUMIN/CREAT RATIO POC: <30 MG/G

## 2018-10-16 RX ORDER — NAPROXEN 500 MG/1
500 TABLET ORAL
Qty: 30 TAB | Refills: 0 | Status: SHIPPED | OUTPATIENT
Start: 2018-10-16 | End: 2021-08-30

## 2018-10-16 NOTE — MR AVS SNAPSHOT
[address] [id number] Patient: Sumi Angel MRN: [id number] UEL:[id number] Visit Information Date & Time Provider Department Dept. Phone Encounter #  
 10/16/2018  9:20 AM Sotero Richmond MD [address] [facility] [phone] [id number] Follow-up Instructions Return in about 3 months (around 1/16/2019). Upcoming Health Maintenance Date Due Hepatitis C Screening 1950 FOOT EXAM Q1 12/15/1960 EYE EXAM RETINAL OR DILATED Q1 12/15/1960 DTaP/Tdap/Td series (1 - Tdap) 12/15/1971 Shingrix Vaccine Age 50> (1 of 2) 12/15/2000 BREAST CANCER SCRN MAMMOGRAM 12/15/2000 GLAUCOMA SCREENING Q2Y 12/15/2015 Bone Densitometry (Dexa) Screening 12/15/2015 Pneumococcal 65+ Low/Medium Risk (1 of 2 - PCV13) 12/15/2015 HEMOGLOBIN A1C Q6M 3/28/2018 MEDICARE YEARLY EXAM 6/8/2018 Influenza Age 5 to Adult 8/1/2018 MICROALBUMIN Q1 9/28/2018 LIPID PANEL Q1 9/28/2018 COLONOSCOPY 8/3/2025 Allergies as of 10/16/2018  Review Complete On: 5/1/2018 By: Clemente Vaz LPN No Known Allergies Current Immunizations  Reviewed on 6/6/2018 No immunizations on file. Not reviewed this visit You Were Diagnosed With   
  
 Codes Comments Type 2 diabetes with nephropathy (HCC)    -  Primary ICD-10-CM: E11.21 
ICD-9-CM: 250.40, 583.81 Essential hypertension     ICD-10-CM: I10 
ICD-9-CM: 401.9 Breast cancer screening     ICD-10-CM: Z12.31 
ICD-9-CM: V76.10 Encounter for immunization     ICD-10-CM: I10 ICD-9-CM: V03.89 Encounter for hepatitis C screening test for low risk patient     ICD-10-CM: Z11.59 
ICD-9-CM: V73.89 Influenza vaccination declined     ICD-10-CM: R87.79 ICD-9-CM: V64.06 Vitals BP Pulse Temp Resp Height(growth percentile) Weight(growth percentile) 135/77 83 97.9 °F (36.6 °C) (Oral) 17 5' 3\" (1.6 m) 158 lb (71.7 kg) SpO2 BMI OB Status Smoking Status 99% 27.99 kg/m2 Hysterectomy Former Smoker Vitals History BMI and BSA Data Body Mass Index Body Surface Area  
 27.99 kg/m 2 1.79 m 2 Preferred Pharmacy Pharmacy Name Phone [address] [facility], [address] [facility] BEULAH [place] [facility] [phone] Your Updated Medication List  
  
   
This list is accurate as of 10/16/18 10:32 AM.  Always use your most recent med list.  
  
  
  
  
 albuterol 90 mcg/actuation inhaler Commonly known as:  PROVENTIL HFA, VENTOLIN HFA, PROAIR HFA Take 2 Puffs by inhalation every four (4) hours as needed for Wheezing. amLODIPine 10 mg tablet Commonly known as:  [place] Take 1 Tab by mouth nightly. Indications: hypertension  
  
 cyclobenzaprine 10 mg tablet Commonly known as:  FLEXERIL Take 0.5 Tabs by mouth three (3) times daily as needed for Muscle Spasm(s). losartan 100 mg tablet Commonly known as:  COZAAR Take 1 Tab by mouth daily. pneumococcal 13 sara conj dip 0.5 mL Syrg injection Commonly known as:  PREVNAR-13  
0.5 mL by IntraMUSCular route once for 1 dose. Prescriptions Sent to Pharmacy Refills  
 pneumococcal 13 sara conj dip (PREVNAR-13) 0.5 mL syrg injection 0 Si.5 mL by IntraMUSCular route once for 1 dose. Class: Normal  
 Pharmacy: [facility] [address], [address] [place] [address] Ph #: [phone] Route: IntraMUSCular We Performed the Following AMB POC URINE, MICROALBUMIN, SEMIQUANT (3 RESULTS) [89115 CPT(R)] CBC WITH AUTOMATED DIFF [22098 CPT(R)] HEMOGLOBIN A1C WITH EAG [37278 CPT(R)] HEPATITIS C AB [06386 CPT(R)]  DIABETES FOOT EXAM [HM7 Custom] LIPID PANEL [83926 CPT(R)] METABOLIC PANEL, COMPREHENSIVE [90056 CPT(R)] Follow-up Instructions Return in about 3 months (around 1/16/2019). To-Do List   
 10/17/2018 Imaging:  DEDE MAMMO BI SCREENING INCL CAD Patient Instructions Learning About Tests When You Have Diabetes Why do you need regular diabetes tests? Diabetes can be hard on your body if it's not well controlled. But having tests on a regular schedule can help you and your doctor find problems early, when it's easier to start managing them. What tests do you need? The tests you may have, how often you should have them, and the goals of the tests are: 
A1c blood test. This test shows the average level of blood sugar over the past 2 to 3 months. It helps your doctor see whether blood sugar levels have been staying within your target range. · How often: Every 3 to 6 months · Goal: A blood sugar level in your target range Blood pressure test: This test measures the pressure of blood flow in the arteries. Controlling blood pressure can help prevent damage to nerves and blood vessels. · How often: Every 3 to 6 months · Goal: A blood pressure level in your target range Cholesterol test: This test measures the amount of a type of fat in the blood. It is common for people with diabetes to also have high cholesterol. Too much cholesterol in the blood can build up inside the blood vessels and raise the risk for heart attack and stroke. · How often: At the time of your diabetes diagnosis, and as often as your doctor recommends after that · Goal: A cholesterol level in your target range Albumin-creatinine ratio test: This test checks for kidney damage by looking for the protein albumin (say \"al-BYOO-janina\") in the urine. Albumin is normally found in the blood. Kidney damage can let small amounts of it (microalbumin) leak into the urine. · How often: Once a year · Goal: No protein in the urine Blood creatinine test/estimated glomerular filtration (eGFR):  The blood creatinine (say \"lrsv-KN-gk-neen\") level shows how well your kidneys are working. Creatinine is a waste product that muscles release into the blood. Blood creatinine is used to estimate the glomerular filtration rate. A high level of creatinine and/or a low eGFR may mean your kidneys are not working as well as they should. · How often: Once a year · Goal: Normal level of creatinine in the blood. The eGFR goal is greater than 60 mL/min/1.73 m². Complete foot exam: The doctor checks for foot sores and whether any sensation has been lost. 
· How often: Once a year · Goal: Healthy feet with no foot ulcers or loss of feeling Dental exam and cleaning: The dentist checks for gum disease and tooth decay. People with high blood sugar are more likely to have these problems. · How often: Every 6 months · Goal: Healthy teeth and gums Complete eye exam: High blood sugar levels can damage the eyes. This exam is done by an ophthalmologist or optometrist. It includes a dilated eye exam. The exam shows whether there's damage to the back of the eye (diabetic retinopathy). · How often: Once a year. If you don't have any signs of diabetic retinopathy, your doctor may recommend an exam every 2 years. · Goal: No damage to the back of the eye Thyroid-stimulating hormone (TSH) blood test: This test checks for thyroid disease. Too little thyroid hormone can cause some medicines (like insulin) to stay in the body longer. This can cause low blood sugar. You may be tested if you have high cholesterol or are a woman over 48years old. · How often: As part of your diabetes diagnosis, and as often as your doctor recommends after that · Goal: Normal level of TSH in the blood Follow-up care is a key part of your treatment and safety. Be sure to make and go to all appointments, and call your doctor if you are having problems. It's also a good idea to know your test results and keep a list of the medicines you take. Where can you learn more? Go to http://katerin-celina.info/. Enter [id number] in the search box to learn more about \"Learning About Tests When You Have Diabetes. \" Current as of: December 7, 2017 Content Version: 11.8 © 2488-3237 [employer]. Care instructions adapted under license by "[employer] (which disclaims liability or warranty for this information). If you have questions about a medical condition or this instruction, always ask your healthcare professional. [address] any warranty or liability for your use of this information. Learning About Breast Cancer Screening What is breast cancer screening? Breast cancer occurs when cells that are not normal grow in one or both of your breasts. Screening tests can help find breast cancer early. Cancer is easier to treat when it's found early. Having concerns about breast cancer is common. That's why it's important to talk with your doctor about when to start and how often to get screened for breast cancer. How is breast cancer screening done? Several screening tests can be used to check for breast cancer. · Mammograms check for signs of cancer using X-rays. They can show tumors that are too small for you or your doctor to feel. During a mammogram, a machine squeezes your breasts to make them flatter and easier to X-ray. At least two pictures are taken of each breast. One is taken from the top and one from the side. · 3-D mammograms are also called digital breast tomosynthesis. Your breast is positioned on a flat plate. A top plate is pressed against your breast to keep it in position. The X-ray arm then moves in an arc above the breast and takes many pictures. A computer uses these X-rays to create a three-dimensional image. · Clinical breast exams are a doctor's exam. Your doctor carefully feels your breasts and under your arms to check for lumps or other changes. After the screening, your doctor will tell you the results. You will also be told if you need any follow-up tests. When should you get screened? Talk with your doctor about when you should start being tested for breast cancer. How often you get tested and the kind of tests you get will depend on your age and your risk. The guidelines that follow are for women who have an average risk for breast cancer. If you have a higher risk for breast cancer, such as having a family history of breast cancer in multiple relatives or at a young age, your doctor may recommend different screening for you. · Ages 21 to 44: Some experts recommend that women have a clinical breast exam every 3 years, starting at age 21. Ask your doctor how often you should have this test. If you have a high risk for breast cancer, talk with your doctor about when to start yearly mammograms and other screening tests. · Ages 36 and older: Talk with your doctor about how often you should have mammograms and clinical breast exams. What is your risk for breast cancer? If you don't already know your risk of breast cancer, you can ask your doctor about it. You can also look it up at www.cancer.gov/bcrisktool/. If your doctor says that you have a high or very high risk, ask about ways to reduce your risk. These could include getting extra screening, taking medicine, or having surgery. If you have a strong family history of breast cancer, ask your doctor about genetic testing. What steps can you take to stay healthy? Some things that increase your risk of breast cancer, such as your age and being female, cannot be controlled. But you can do some things to stay as healthy as you can. · Learn what your breasts normally look and feel like. If you notice any changes, tell your doctor. · Drink alcohol wisely. Your risk goes up the more you drink. For the best health, women should have no more than 1 drink a day or 7 drinks a week. · If you smoke, quit. When you quit smoking, you lower your chances of getting many types of cancer. You can also do your best to eat well, be active, and stay at a healthy weight. Eating healthy foods and being active every day, as well as staying at a healthy weight, may help prevent cancer. Where can you learn more? Go to http://katerin-celina.info/. Enter A456 in the search box to learn more about \"Learning About Breast Cancer Screening. \" Current as of: March 28, 2018 Content Version: 11.8 © 7571-4325 [employer]JobCompany. Care instructions adapted under license by [employer] (which disclaims liability or warranty for this information). If you have questions about a medical condition or this instruction, always ask your healthcare professional. [address] any warranty or liability for your use of this information. Mammogram: About This Test 
What is it? A mammogram is an X-ray of the breast that is used to screen for breast cancer. This test can find tumors that are too small for you or your doctor to feel. Cancer is most easily treated and cured when it is found at an early stage. Why is this test done? A mammogram is done to: 
· Look for breast cancer in women who don't have symptoms. · Find breast cancer in women who have symptoms. Symptoms of breast cancer may include a lump or thickening in the breast, nipple discharge, or dimpling of the skin on one area of the breast. 
· Find an area of suspicious breast tissue to remove for an exam under a microscope (biopsy). How can you prepare for the test? 
· Tell your doctor if you: ¨ Are or might be pregnant. ¨ Are breastfeeding. ¨ Have breast implants. ¨ Have previously had a breast biopsy. · On the day of the test, don't use any deodorant, perfume, powders, or ointments.  
What happens before the test? 
· You will need to take off any jewelry that might interfere with the X-ray pictures. · You will need to take off your clothes above the waist. 
· You will be given a cloth or paper gown to use during the test. 
What happens during the test? 
· You usually stand during a mammogram. 
· One at a time, your breasts will be placed on a flat plate that contains the X-ray film. · Another plate is then pressed firmly against your breast to help flatten out the breast tissue. You may be asked to lift your arm. · For a few seconds while the X-ray picture is being taken, you will need to hold your breath. · At least two pictures are taken of each breast. One is taken from the top and one from the side. What else should you know about the test? 
· The X-ray plate will feel cold when you place your breast on it. Having your breasts flattened and squeezed isn't comfortable. But it is necessary to flatten out the breast tissue to get the best pictures. · Mammograms do not prevent breast cancer or reduce a woman's risk of developing cancer. · Most things that are found during a mammogram are not breast cancer. How long does the test take? · The test will take about 10 to 15 minutes. You may be in the clinic for up to an hour. What happens after the test? 
· You will probably be able to go home right away. · You can go back to your usual activities right away. Follow-up care is a key part of your treatment and safety. Be sure to make and go to all appointments, and call your doctor if you are having problems. It's also a good idea to keep a list of the medicines you take. Ask your doctor when you can expect to have your test results. Where can you learn more? Go to http://katerin-celina.info/. Enter I582 in the search box to learn more about \"Mammogram: About This Test.\" Current as of: March 28, 2018 Content Version: 11.8 © 7647-3443 Healthwise[employer] Incorporated.  Care instructions adapted under license by [employer] (which disclaims liability or warranty for this information). If you have questions about a medical condition or this instruction, always ask your healthcare professional. [address] [address] any warranty or liability for your use of this information. Pneumococcal Conjugate Vaccine (PCV13): What You Need to Know Why get vaccinated? Vaccination can protect both children and adults from pneumococcal disease. Pneumococcal disease is caused by bacteria that can spread from person to person through close contact. It can cause ear infections, and it can also lead to more serious infections of the: 
· Lungs (pneumonia). · Blood (bacteremia). · Covering of the brain and spinal cord (meningitis). Pneumococcal pneumonia is most common among adults. Pneumococcal meningitis can cause deafness and brain damage, and it kills about 1 child in 10 who get it. Anyone can get pneumococcal disease, but children under 3years of age and adults 72 years and older, people with certain medical conditions, and cigarette smokers are at the highest risk. Before there was a vaccine, the [facility] saw the following in children under 5 each year from pneumococcal disease: · More than 700 cases of meningitis · About 13,000 blood infections · About 5 million ear infections · About 200 deaths Since the vaccine became available, severe pneumococcal disease in these children has fallen by 88%. About 18,000 older adults die of pneumococcal disease each year in the [place]. Treatment of pneumococcal infections with penicillin and other drugs is not as effective as it used to be, because some strains of the disease have become resistant to these drugs. This makes prevention of the disease through vaccination even more important. PCV13 vaccine Pneumococcal conjugate vaccine (called PCV13) protects against 13 types of pneumococcal bacteria. PCV13 is routinely given to children at 2, 4, 6, and 1515 months of age. It is also recommended for children and adults 3to 59years of age with certain health conditions, and for all adults 72years of age and older. Your doctor can give you details. Some people should not get this vaccine Anyone who has ever had a life-threatening allergic reaction to a dose of this vaccine, to an earlier pneumococcal vaccine called PCV7, or to any vaccine containing diphtheria toxoid (for example, DTaP), should not get PCV13. Anyone with a severe allergy to any component of PCV13 should not get the vaccine. Tell your doctor if the person being vaccinated has any severe allergies. If the person scheduled for vaccination is not feeling well, your healthcare provider might decide to reschedule the shot on another day. Risks of a vaccine reaction With any medicine, including vaccines, there is a chance of reactions. These are usually mild and go away on their own, but serious reactions are also possible. Problems reported following PCV13 varied by age and dose in the series. The most common problems reported among children were: · About half became drowsy after the shot, had a temporary loss of appetite, or had redness or tenderness where the shot was given. · About 1 out of 3 had swelling where the shot was given. · About 1 out of 3 had a mild fever, and about 1 in 20 had a fever over 102.2°F. 
· Up to about 8 out of 10 became fussy or irritable. Adults have reported pain, redness, and swelling where the shot was given; also mild fever, fatigue, headache, chills, or muscle pain. Osiris Jarquin children who get PCV13 along with inactivated flu vaccine at the same time may be at increased risk for seizures caused by fever. Ask your doctor for more information. Problems that could happen after any vaccine: · People sometimes faint after a medical procedure, including vaccination.  Sitting or lying down for about 15 minutes can help prevent fainting and the injuries caused by a fall. Tell your doctor if you feel dizzy or have vision changes or ringing in the ears. · Some older children and adults get severe pain in the shoulder and have difficulty moving the arm where a shot was given. This happens very rarely. · Any medication can cause a severe allergic reaction. Such reactions from a vaccine are very rare, estimated at about 1 in a million doses, and would happen within a few minutes to a few hours after the vaccination. As with any medicine, there is a very small chance of a vaccine causing a serious injury or death. The safety of vaccines is always being monitored. For more information, visit: www.cdc.gov/vaccinesafety. What if there is a serious reaction? What should I look for? · Look for anything that concerns you, such as signs of a severe allergic reaction, very high fever, or unusual behavior. Signs of a severe allergic reaction can include hives, swelling of the face and throat, difficulty breathing, a fast heartbeat, dizziness, and weakness, usually within a few minutes to a few hours after the vaccination. What should I do? · If you think it is a severe allergic reaction or other emergency that can't wait, call 911 or get the person to the nearest hospital. Otherwise, call your doctor. · Reactions should be reported to the Vaccine Adverse Event Reporting System (VAERS). Your doctor should file this report, or you can do it yourself through the VAERS website at www.vaers. hhs.gov, or by calling 8-846.548.5665. VAERS does not give medical advice. The National Vaccine Injury Compensation Program 
The National Vaccine Injury Compensation Program (VICP) is a federal program that was created to compensate people who may have been injured by certain vaccines.  
Persons who believe they may have been injured by a vaccine can learn about the program and about filing a claim by calling 2-497.287.1937 or visiting the [employer] website at www.[facility].gov/vaccinecompensation. There is a time limit to file a claim for compensation. How can I learn more? · Ask your healthcare provider. He or she can give you the vaccine package insert or suggest other sources of information. · Call your local or state health department. · Contact the Centers for Disease Control and Prevention (CDC): 
¨ Call 2-155.456.2093 (1-800-CDC-INFO) or ¨ Visit CDC's website at www.cdc.gov/vaccines Vaccine Information Statement PCV13 Vaccine 11/5/2015 
[address] Department of Health and [employer] Centers for Disease Control and Prevention Many Vaccine Information Statements are available in Turkish and other languages. See www.immunize.org/vis. Muchas hojas de información sobre vacunas están disponibles en español y en otros idiomas. Visite www.immunize.org/vis. Care instructions adapted under license by [employer] (which disclaims liability or warranty for this information). If you have questions about a medical condition or this instruction, always ask your healthcare professional. [address] any warranty or liability for your use of this information. Introducing [facility]! [facility] introduces Capiota patient portal. Now you can access parts of your medical record, email your doctor's office, and request medication refills online. 1. In your internet browser, go to [email or web address] 2. Click on the First Time User? Click Here link in the Sign In box. You will see the New Member Sign Up page. 3. Enter your Capiota Access Code exactly as it appears below. You will not need to use this code after youve completed the sign-up process. If you do not sign up before the expiration date, you must request a new code. · Capiota Access Code: [email or web address] Expires: 1/14/2019 10:32 AM 
 
 4. Enter the last four digits of your Social Security Number (xxxx) and Date of Birth (mm/dd/yyyy) as indicated and click Submit. You will be taken to the next sign-up page. 5. Create a GetYou ID. This will be your GetYou login ID and cannot be changed, so think of one that is secure and easy to remember. 6. Create a GetYou password. You can change your password at any time. 7. Enter your Password Reset Question and Answer. This can be used at a later time if you forget your password. 8. Enter your e-mail address. You will receive e-mail notification when new information is available in [address] 9. Click Sign Up. You can now view and download portions of your medical record. 10. Click the Download Summary menu link to download a portable copy of your medical information. If you have questions, please visit the Frequently Asked Questions section of the GetYou website. Remember, GetYou is NOT to be used for urgent needs. For medical emergencies, dial 911. Now available from your iPhone and Android! Please provide this summary of care documentation to your next provider. Your primary care clinician is listed as Philip Lima. If you have any questions after today's visit, please call [phone].

## 2018-10-16 NOTE — PATIENT INSTRUCTIONS
Learning About Tests When You Have Diabetes  Why do you need regular diabetes tests? Diabetes can be hard on your body if it's not well controlled. But having tests on a regular schedule can help you and your doctor find problems early, when it's easier to start managing them. What tests do you need? The tests you may have, how often you should have them, and the goals of the tests are:  A1c blood test. This test shows the average level of blood sugar over the past 2 to 3 months. It helps your doctor see whether blood sugar levels have been staying within your target range. · How often: Every 3 to 6 months  · Goal: A blood sugar level in your target range  Blood pressure test: This test measures the pressure of blood flow in the arteries. Controlling blood pressure can help prevent damage to nerves and blood vessels. · How often: Every 3 to 6 months  · Goal: A blood pressure level in your target range  Cholesterol test: This test measures the amount of a type of fat in the blood. It is common for people with diabetes to also have high cholesterol. Too much cholesterol in the blood can build up inside the blood vessels and raise the risk for heart attack and stroke. · How often: At the time of your diabetes diagnosis, and as often as your doctor recommends after that  · Goal: A cholesterol level in your target range  Albumin-creatinine ratio test: This test checks for kidney damage by looking for the protein albumin (say \"al-BYOO-janina\") in the urine. Albumin is normally found in the blood. Kidney damage can let small amounts of it (microalbumin) leak into the urine. · How often: Once a year  · Goal: No protein in the urine  Blood creatinine test/estimated glomerular filtration (eGFR): The blood creatinine (say \"yglj-TS-qh-neen\") level shows how well your kidneys are working. Creatinine is a waste product that muscles release into the blood.  Blood creatinine is used to estimate the glomerular filtration rate. A high level of creatinine and/or a low eGFR may mean your kidneys are not working as well as they should. · How often: Once a year  · Goal: Normal level of creatinine in the blood. The eGFR goal is greater than 60 mL/min/1.73 m². Complete foot exam: The doctor checks for foot sores and whether any sensation has been lost.  · How often: Once a year  · Goal: Healthy feet with no foot ulcers or loss of feeling  Dental exam and cleaning: The dentist checks for gum disease and tooth decay. People with high blood sugar are more likely to have these problems. · How often: Every 6 months  · Goal: Healthy teeth and gums  Complete eye exam: High blood sugar levels can damage the eyes. This exam is done by an ophthalmologist or optometrist. It includes a dilated eye exam. The exam shows whether there's damage to the back of the eye (diabetic retinopathy). · How often: Once a year. If you don't have any signs of diabetic retinopathy, your doctor may recommend an exam every 2 years. · Goal: No damage to the back of the eye  Thyroid-stimulating hormone (TSH) blood test: This test checks for thyroid disease. Too little thyroid hormone can cause some medicines (like insulin) to stay in the body longer. This can cause low blood sugar. You may be tested if you have high cholesterol or are a woman over 48years old. · How often: As part of your diabetes diagnosis, and as often as your doctor recommends after that  · Goal: Normal level of TSH in the blood  Follow-up care is a key part of your treatment and safety. Be sure to make and go to all appointments, and call your doctor if you are having problems. It's also a good idea to know your test results and keep a list of the medicines you take. Where can you learn more? Go to http://katerin-celina.info/. Enter [id number] in the search box to learn more about \"Learning About Tests When You Have Diabetes. \"  Current as of: December 7, 2017  Content Version: 11.8  © 3462-0930 Healthwise, [employer]. Care instructions adapted under license by [employer] (which disclaims liability or warranty for this information). If you have questions about a medical condition or this instruction, always ask your healthcare professional. [address] any warranty or liability for your use of this information. Learning About Breast Cancer Screening  What is breast cancer screening? Breast cancer occurs when cells that are not normal grow in one or both of your breasts. Screening tests can help find breast cancer early. Cancer is easier to treat when it's found early. Having concerns about breast cancer is common. That's why it's important to talk with your doctor about when to start and how often to get screened for breast cancer. How is breast cancer screening done? Several screening tests can be used to check for breast cancer. · Mammograms check for signs of cancer using X-rays. They can show tumors that are too small for you or your doctor to feel. During a mammogram, a machine squeezes your breasts to make them flatter and easier to X-ray. At least two pictures are taken of each breast. One is taken from the top and one from the side. · 3-D mammograms are also called digital breast tomosynthesis. Your breast is positioned on a flat plate. A top plate is pressed against your breast to keep it in position. The X-ray arm then moves in an arc above the breast and takes many pictures. A computer uses these X-rays to create a three-dimensional image. · Clinical breast exams are a doctor's exam. Your doctor carefully feels your breasts and under your arms to check for lumps or other changes. After the screening, your doctor will tell you the results. You will also be told if you need any follow-up tests. When should you get screened? Talk with your doctor about when you should start being tested for breast cancer.  How often you get tested and the kind of tests you get will depend on your age and your risk. The guidelines that follow are for women who have an average risk for breast cancer. If you have a higher risk for breast cancer, such as having a family history of breast cancer in multiple relatives or at a young age, your doctor may recommend different screening for you. · Ages 21 to 44: Some experts recommend that women have a clinical breast exam every 3 years, starting at age 21. Ask your doctor how often you should have this test. If you have a high risk for breast cancer, talk with your doctor about when to start yearly mammograms and other screening tests. · Ages 36 and older: Talk with your doctor about how often you should have mammograms and clinical breast exams. What is your risk for breast cancer? If you don't already know your risk of breast cancer, you can ask your doctor about it. You can also look it up at www.cancer.gov/bcrisktool/. If your doctor says that you have a high or very high risk, ask about ways to reduce your risk. These could include getting extra screening, taking medicine, or having surgery. If you have a strong family history of breast cancer, ask your doctor about genetic testing. What steps can you take to stay healthy? Some things that increase your risk of breast cancer, such as your age and being female, cannot be controlled. But you can do some things to stay as healthy as you can. · Learn what your breasts normally look and feel like. If you notice any changes, tell your doctor. · Drink alcohol wisely. Your risk goes up the more you drink. For the best health, women should have no more than 1 drink a day or 7 drinks a week. · If you smoke, quit. When you quit smoking, you lower your chances of getting many types of cancer. You can also do your best to eat well, be active, and stay at a healthy weight.  Eating healthy foods and being active every day, as well as staying at a healthy weight, may help prevent cancer. Where can you learn more? Go to http://katerin-celina.info/. Enter J050 in the search box to learn more about \"Learning About Breast Cancer Screening. \"  Current as of: March 28, 2018  Content Version: 11.8  © 1900-7614 [employer]. Care instructions adapted under license by test company (which disclaims liability or warranty for this information). If you have questions about a medical condition or this instruction, always ask your healthcare professional. [address] any warranty or liability for your use of this information. Mammogram: About This Test  What is it? A mammogram is an X-ray of the breast that is used to screen for breast cancer. This test can find tumors that are too small for you or your doctor to feel. Cancer is most easily treated and cured when it is found at an early stage. Why is this test done? A mammogram is done to:  · Look for breast cancer in women who don't have symptoms. · Find breast cancer in women who have symptoms. Symptoms of breast cancer may include a lump or thickening in the breast, nipple discharge, or dimpling of the skin on one area of the breast.  · Find an area of suspicious breast tissue to remove for an exam under a microscope (biopsy). How can you prepare for the test?  · Tell your doctor if you:  ¨ Are or might be pregnant. ¨ Are breastfeeding. ¨ Have breast implants. ¨ Have previously had a breast biopsy. · On the day of the test, don't use any deodorant, perfume, powders, or ointments. What happens before the test?  · You will need to take off any jewelry that might interfere with the X-ray pictures.   · You will need to take off your clothes above the waist.  · You will be given a cloth or paper gown to use during the test.  What happens during the test?  · You usually stand during a mammogram.  · One at a time, your breasts will be placed on a flat plate that contains the X-ray film. · Another plate is then pressed firmly against your breast to help flatten out the breast tissue. You may be asked to lift your arm. · For a few seconds while the X-ray picture is being taken, you will need to hold your breath. · At least two pictures are taken of each breast. One is taken from the top and one from the side. What else should you know about the test?  · The X-ray plate will feel cold when you place your breast on it. Having your breasts flattened and squeezed isn't comfortable. But it is necessary to flatten out the breast tissue to get the best pictures. · Mammograms do not prevent breast cancer or reduce a woman's risk of developing cancer. · Most things that are found during a mammogram are not breast cancer. How long does the test take? · The test will take about 10 to 15 minutes. You may be in the clinic for up to an hour. What happens after the test?  · You will probably be able to go home right away. · You can go back to your usual activities right away. Follow-up care is a key part of your treatment and safety. Be sure to make and go to all appointments, and call your doctor if you are having problems. It's also a good idea to keep a list of the medicines you take. Ask your doctor when you can expect to have your test results. Where can you learn more? Go to http://katerin-celina.info/. Enter N830 in the search box to learn more about \"Mammogram: About This Test.\"  Current as of: March 28, 2018  Content Version: 11.8  © 8397-7159 Healthwise, [employer]. Care instructions adapted under license by [employer] (which disclaims liability or warranty for this information). If you have questions about a medical condition or this instruction, always ask your healthcare professional. [address] any warranty or liability for your use of this information. Pneumococcal Conjugate Vaccine (PCV13):  What You Need to Know  Why get vaccinated? Vaccination can protect both children and adults from pneumococcal disease. Pneumococcal disease is caused by bacteria that can spread from person to person through close contact. It can cause ear infections, and it can also lead to more serious infections of the:  · Lungs (pneumonia). · Blood (bacteremia). · Covering of the brain and spinal cord (meningitis). Pneumococcal pneumonia is most common among adults. Pneumococcal meningitis can cause deafness and brain damage, and it kills about 1 child in 10 who get it. Anyone can get pneumococcal disease, but children under 3years of age and adults 72 years and older, people with certain medical conditions, and cigarette smokers are at the highest risk. Before there was a vaccine, the [facility] saw the following in children under 5 each year from pneumococcal disease:  · More than 700 cases of meningitis  · About 13,000 blood infections  · About 5 million ear infections  · About 200 deaths  Since the vaccine became available, severe pneumococcal disease in these children has fallen by 88%. About 18,000 older adults die of pneumococcal disease each year in the [place]. Treatment of pneumococcal infections with penicillin and other drugs is not as effective as it used to be, because some strains of the disease have become resistant to these drugs. This makes prevention of the disease through vaccination even more important. PCV13 vaccine  Pneumococcal conjugate vaccine (called PCV13) protects against 13 types of pneumococcal bacteria. PCV13 is routinely given to children at 2, 4, 6, and 1515 months of age. It is also recommended for children and adults 3to 59years of age with certain health conditions, and for all adults 72years of age and older. Your doctor can give you details.   Some people should not get this vaccine  Anyone who has ever had a life-threatening allergic reaction to a dose of this vaccine, to an earlier pneumococcal vaccine called PCV7, or to any vaccine containing diphtheria toxoid (for example, DTaP), should not get PCV13. Anyone with a severe allergy to any component of PCV13 should not get the vaccine. Tell your doctor if the person being vaccinated has any severe allergies. If the person scheduled for vaccination is not feeling well, your healthcare provider might decide to reschedule the shot on another day. Risks of a vaccine reaction  With any medicine, including vaccines, there is a chance of reactions. These are usually mild and go away on their own, but serious reactions are also possible. Problems reported following PCV13 varied by age and dose in the series. The most common problems reported among children were:  · About half became drowsy after the shot, had a temporary loss of appetite, or had redness or tenderness where the shot was given. · About 1 out of 3 had swelling where the shot was given. · About 1 out of 3 had a mild fever, and about 1 in 20 had a fever over 102.2°F.  · Up to about 8 out of 10 became fussy or irritable. Adults have reported pain, redness, and swelling where the shot was given; also mild fever, fatigue, headache, chills, or muscle pain. Judieth Bile children who get PCV13 along with inactivated flu vaccine at the same time may be at increased risk for seizures caused by fever. Ask your doctor for more information. Problems that could happen after any vaccine:  · People sometimes faint after a medical procedure, including vaccination. Sitting or lying down for about 15 minutes can help prevent fainting and the injuries caused by a fall. Tell your doctor if you feel dizzy or have vision changes or ringing in the ears. · Some older children and adults get severe pain in the shoulder and have difficulty moving the arm where a shot was given. This happens very rarely. · Any medication can cause a severe allergic reaction.  Such reactions from a vaccine are very rare, estimated at about 1 in a million doses, and would happen within a few minutes to a few hours after the vaccination. As with any medicine, there is a very small chance of a vaccine causing a serious injury or death. The safety of vaccines is always being monitored. For more information, visit: www.cdc.gov/vaccinesafety. What if there is a serious reaction? What should I look for? · Look for anything that concerns you, such as signs of a severe allergic reaction, very high fever, or unusual behavior. Signs of a severe allergic reaction can include hives, swelling of the face and throat, difficulty breathing, a fast heartbeat, dizziness, and weakness, usually within a few minutes to a few hours after the vaccination. What should I do? · If you think it is a severe allergic reaction or other emergency that can't wait, call 911 or get the person to the nearest hospital. Otherwise, call your doctor. · Reactions should be reported to the Vaccine Adverse Event Reporting System (VAERS). Your doctor should file this report, or you can do it yourself through the VAERS website at www.vaers. hhs.gov, or by calling 7-746.289.8101. VAERS does not give medical advice. The National Vaccine Injury Compensation Program  The National Vaccine Injury Compensation Program (VICP) is a federal program that was created to compensate people who may have been injured by certain vaccines. Persons who believe they may have been injured by a vaccine can learn about the program and about filing a claim by calling 4-242.311.1670 or visiting the [employer] website at www.[facility].gov/vaccinecompensation. There is a time limit to file a claim for compensation. How can I learn more? · Ask your healthcare provider. He or she can give you the vaccine package insert or suggest other sources of information. · Call your local or state health department.   · Contact the Centers for Disease Control and Prevention (CDC):  ¨ Call 2-278.660.2850 (7-7775-855-YSH-INFO) or  ¨ Visit CDC's website at www.cdc.gov/vaccines  Vaccine Information Statement  PCV13 Vaccine  11/5/2015  [address] [id number]-[id number]  Department of Health and Human Services  Centers for Disease Control and Prevention  Many Vaccine Information Statements are available in [place]an and other languages. See www.immunize.org/vis. Muchas hojas de información sobre vacunas están disponibles en español y en otros idiomas. Visite www.immunize.org/vis. Care instructions adapted under license by [employer] (which disclaims liability or warranty for this information). If you have questions about a medical condition or this instruction, always ask your healthcare professional. [address] any warranty or liability for your use of this information.

## 2018-10-16 NOTE — LETTER
October is National Breast Cancer Awareness Month. Our system identified you as being eligible for breast cancer screening by mammography. Did you know? · Breast cancer is the most common cancer among American women, except for skin cancers, and is the second leading cause of cancer death in women. · About 1 in 8 (12%) women in the [address] [facility] will develop invasive breast cancer during their lifetime. · The American Cancer Society;s estimates for breast cancer in the [place] for 2015 are: · About 231,840 new cases of invasive breast cancer will be diagnosed in women · About 60,290 new cases of carcinoma in situ (CIS) will be diagnosed (CIS is non-invasive and is the earliest form of breast cancer) · About 40,29- women will die from breast cancer · At this time there are more than 2.8 million breast cancer survivors in the [address] [facility]. Currently, the [facility] [place] (USPSTF) recommends that women ages 48 to 76 receive screening mammography every 2 years. This may be offered to women 36 to 52 who are at increased risk of breast cancer (e.g. Women with a parent, sibling, or child with breast cancer). A mammogram is an x-ray exam of the breast that is used to detect and evaluate breast changes. The whole procedure takes about 20 minutes. If you have additional questions regarding breast cancer or mammography you may visit the [employer] website at [address] [employer] [employer]. org/cancer/breastcancer/. Please call [phone] to schedule an appointment with your physician to discuss your breast cancer risk or simply request an order for your screening mammogram. 
 
Sincerely, 
 
 
Lillie Collet, MD 
 
P.S.    
Many patients ask, Where can I get help with mammogram costs? \"Medicare, Medicaid, and all private health insurance policies created after March 23, 2010 cover screening mammogram costs.  The [facility] law requires that health insurance companies pay for screening mammograms. Insurance coverage is different for diagnostic mammograms, which usually cost more than screening mammograms. Low-cost mammograms are available in most areas. Call the Camila Fonseca at 8-421.513.6273 for information about facilities in your area. The National Breast and Cervical Cancer Early Detection Program (NBCCEDP) also provides breast and cervical cancer early detection testing to women without health insurance for free or at very little cost. To learn more about this program, please contact the Centers for Disease Control and Prevention (CDC) at 8-730-CDC INFO (7-792.298.5153) or visit their website at www.cdc.gov/cancer. \" ([employer] [employer]. org). [facility] has awarded [address] [place] [address]' [employer] funds to offers screening for uninsured patients   For more information on the [employer] program at [address] [place] [address], please call [phone] ([phone]).

## 2018-10-16 NOTE — PROGRESS NOTES
Chief Complaint   Patient presents with    Blood Pressure Check     1. Have you been to the ER, urgent care clinic since your last visit? Hospitalized since your last visit? No    2. Have you seen or consulted any other health care providers outside of the [address] [place] [address] since your last visit? Include any pap smears or colon screening.  No     Declined flu shot

## 2018-10-16 NOTE — PROGRESS NOTES
Ector Lemus  79 y.o. female  1950  [address] [facility] [id number]  [address] [place] [address] Dr: Progress Note  Nikita Kent MD       Encounter Date: 10/16/2018    Chief Complaint   Patient presents with    Blood Pressure Check     History of Present Illness   Ector Lemus is a 79 y.o. female who presents to clinic today for follow-up on hypertension and DMII. Diabetes Mellitus:  She has diabetes mellitus, and  hyperlipidemia. Diabetic ROS - medication compliance: compliant all of the time, diabetic diet compliance: noncompliant some of the time, home glucose monitoring: is not performed, further diabetic ROS: no polyuria or polydipsia, no chest pain, dyspnea or TIA's, no numbness, tingling or pain in extremities. Lab review: orders written for new lab studies as appropriate; see orders. Hypertension  Patient is here for follow-up of hypertension. She indicates that she is feeling well and denies any symptoms referable to her hypertension. She is not exercising and is adherent to low salt diet. Blood pressure is not well controlled at home. Use of agents associated with hypertension: NSAIDS. Health Maintenance:  Health Maintenance Due   Topic Date Due    Eye Exam  12/15/1960    DTaP/Tdap/Td  (1 - Tdap) 12/15/1971    Shingles Vaccine (1 of 2) 12/15/2000    Glaucoma Screening   12/15/2015    Bone Mineral Density   12/15/2015    Pneumococcal Vaccine (1 of 2 - PCV13) 12/15/2015    Flu Vaccine  08/01/2018    Annual Well Visit  10/18/2018       Review of Systems   Review of Systems   Constitutional: Positive for malaise/fatigue. Negative for chills. Respiratory: Negative. Cardiovascular: Negative. Gastrointestinal: Negative. Genitourinary: Negative. Musculoskeletal: Positive for joint pain.        Vitals/Objective:     Vitals:    10/16/18 0909 10/16/18 1029   BP: 167/77 135/77   Pulse: 93 83   Resp: 17    Temp: 97.9 °F (36.6 °C) TempSrc: Oral    SpO2: 99%    Weight: 158 lb (71.7 kg)    Height: 5' 3\" (1.6 m)      Body mass index is 27.99 kg/(m^2). Physical Exam   Constitutional: She is oriented to person, place, and time. No distress. Cardiovascular: Normal rate, regular rhythm, normal heart sounds and intact distal pulses. Exam reveals no gallop and no friction rub. No murmur heard. Pulmonary/Chest: Effort normal and breath sounds normal.   Musculoskeletal:        Right hip: She exhibits tenderness. Neurological: She is alert and oriented to person, place, and time. Psychiatric: She has a normal mood and affect. Vitals reviewed. Diabetic foot exam performed by Daniela Lynn MD     Measurement  Response   Monofilament  R - 6/6  L - 6/6   Pulse DP R - present  L - present   Pulse TP R - present  L - present   Structural deformity R - None  L - None   Skin Integrity / Deformity R - None  L - None                 Ref. Range 10/16/2018 11:02   Microalbumin/creat ratio (POC) Latest Ref Range: <30 MG/G <30       Assessment and Plan:   1. Type 2 diabetes with nephropathy (HCC)  Stable, based on history, physical exam and review of pertinent labs, studies and medications; meds reconciled; continue current treatment plan. Key Antihyperglycemic Medications     Patient is on no antihyperglycemic meds. Other Key Diabetic Medications             losartan (COZAAR) 100 mg tablet  (Taking) Take 1 Tab by mouth daily.         Lab Results   Component Value Date/Time    Hemoglobin A1c 6.9 10/16/2018 10:47 AM    Glucose 153 10/16/2018 10:47 AM    Creatinine 0.69 10/16/2018 10:47 AM    Microalbumin/creat ratio (POC) <30 10/16/2018 11:02 AM    Cholesterol, total 226 10/16/2018 10:47 AM    HDL Cholesterol 42 10/16/2018 10:47 AM    LDL, calculated Comment 10/16/2018 10:47 AM    Triglyceride 406 10/16/2018 10:47 AM     Diabetic Foot and Eye Exam HM Status   Topic Date Due    Eye Exam  12/15/1960    Diabetic Foot Care 10/16/2019     -  DIABETES FOOT EXAM  - CBC WITH AUTOMATED DIFF  - METABOLIC PANEL, COMPREHENSIVE  - LIPID PANEL  - HEMOGLOBIN A1C WITH EAG  - AMB POC URINE, MICROALBUMIN, SEMIQUANT (3 RESULTS)    2. Essential hypertension  Recheck at goal.  Continue current medications. Key CAD CHF Meds             amLODIPine (NORVASC) 10 mg tablet  (Taking) Take 1 Tab by mouth nightly. Indications: hypertension    losartan (COZAAR) 100 mg tablet  (Taking) Take 1 Tab by mouth daily.        - CBC WITH AUTOMATED DIFF  - METABOLIC PANEL, COMPREHENSIVE  - LIPID PANEL    3. Breast cancer screening  - [facility] MAMMO BI SCREENING INCL CAD; Future    4. Encounter for immunization  - pneumococcal 13 sara conj dip (PREVNAR-13) 0.5 mL syrg injection; 0.5 mL by IntraMUSCular route once for 1 dose. Dispense: 0.5 mL; Refill: 0    5. Encounter for hepatitis C screening test for low risk patient  - HEPATITIS C AB    6. Influenza vaccination declined        I have discussed the diagnosis with the patient and the intended plan as seen in the above orders. she has expressed understanding. The patient has received an after-visit summary and questions were answered concerning future plans. I have discussed medication side effects and warnings with the patient as well. Follow-up Disposition:  Return in about 3 months (around 1/16/2019). Electronically Signed: Iraj Callaway MD     History   Patients past medical, surgical and family histories were reviewed and updated.     Past Medical History:   Diagnosis Date    Diverticulosis 9/23/2015    Hard of hearing     Hypertension     Iron deficiency anemia     baseline 9.7 on 8/2015    PUD (peptic ulcer disease)     hospitalized at [facility], EGD showed a nonbleeding esophageal ulcer, multiple gastric ulcers    Pulmonary nodule 8/2/2015    CT chest: 2.8 cm focal airspace disease RUL, pna vs mass, referred to pulm with PET    Type 2 diabetes mellitus without complication, without long-term current use of insulin ([facility] [address] 9/12/2015     Past Surgical History:   Procedure Laterality Date    HX MALIGNANT SKIN LESION EXCISION Right under  eye    HX PARTIAL HYSTERECTOMY Bilateral      Family History   Problem Relation Age of Onset    Diabetes Mother     Hypertension Mother     Alzheimer Mother     Pacemaker Mother     Heart Attack Father      Social History     Social History    Marital status:      Spouse name: N/A    Number of children: N/A    Years of education: N/A     Occupational History    Not on file. Social History Main Topics    Smoking status: Former Smoker     Packs/day: 0.25     Years: 30.00     Quit date: 10/16/2016    Smokeless tobacco: Never Used    Alcohol use 0.0 oz/week     0 Standard drinks or equivalent per week      Comment: very rare    Drug use: No    Sexual activity: Not on file     Other Topics Concern    Not on file     Social History Narrative            Current Medications/Allergies     Current Outpatient Prescriptions   Medication Sig Dispense Refill    amLODIPine (NORVASC) 10 mg tablet Take 1 Tab by mouth nightly. Indications: hypertension 30 Tab 5    losartan (COZAAR) 100 mg tablet Take 1 Tab by mouth daily. 30 Tab 5    cyclobenzaprine (FLEXERIL) 10 mg tablet Take 0.5 Tabs by mouth three (3) times daily as needed for Muscle Spasm(s). 15 Tab 0    albuterol (PROVENTIL HFA, VENTOLIN HFA, PROAIR HFA) 90 mcg/actuation inhaler Take 2 Puffs by inhalation every four (4) hours as needed for Wheezing.  1 Inhaler 5     No Known Allergies

## 2018-10-17 LAB
ALBUMIN SERPL-MCNC: 4.5 G/DL (ref 3.6–4.8)
ALBUMIN/GLOB SERPL: 1.8 {RATIO} (ref 1.2–2.2)
ALP SERPL-CCNC: 101 IU/L (ref 39–117)
ALT SERPL-CCNC: 19 IU/L (ref 0–32)
AST SERPL-CCNC: 16 IU/L (ref 0–40)
BASOPHILS # BLD AUTO: 0.1 X10E3/UL (ref 0–0.2)
BASOPHILS NFR BLD AUTO: 1 %
BILIRUB SERPL-MCNC: 0.4 MG/DL (ref 0–1.2)
BUN SERPL-MCNC: 12 MG/DL (ref 8–27)
BUN/CREAT SERPL: 17 (ref 12–28)
CALCIUM SERPL-MCNC: 9.6 MG/DL (ref 8.7–10.3)
CHLORIDE SERPL-SCNC: 94 MMOL/L (ref 96–106)
CHOLEST SERPL-MCNC: 226 MG/DL (ref 100–199)
CO2 SERPL-SCNC: 24 MMOL/L (ref 20–29)
CREAT SERPL-MCNC: 0.69 MG/DL (ref 0.57–1)
EOSINOPHIL # BLD AUTO: 0.1 X10E3/UL (ref 0–0.4)
EOSINOPHIL NFR BLD AUTO: 1 %
ERYTHROCYTE [DISTWIDTH] IN BLOOD BY AUTOMATED COUNT: 14.1 % (ref 12.3–15.4)
EST. AVERAGE GLUCOSE BLD GHB EST-MCNC: 151 MG/DL
GLOBULIN SER CALC-MCNC: 2.5 G/DL (ref 1.5–4.5)
GLUCOSE SERPL-MCNC: 153 MG/DL (ref 65–99)
HBA1C MFR BLD: 6.9 % (ref 4.8–5.6)
HCT VFR BLD AUTO: 35.9 % (ref 34–46.6)
HCV AB S/CO SERPL IA: <0.1 S/CO RATIO (ref 0–0.9)
HDLC SERPL-MCNC: 42 MG/DL
HGB BLD-MCNC: 12.2 G/DL (ref 11.1–15.9)
IMM GRANULOCYTES # BLD: 0 X10E3/UL (ref 0–0.1)
IMM GRANULOCYTES NFR BLD: 0 %
INTERPRETATION, 910389: NORMAL
LDLC SERPL CALC-MCNC: ABNORMAL MG/DL (ref 0–99)
LYMPHOCYTES # BLD AUTO: 2.1 X10E3/UL (ref 0.7–3.1)
LYMPHOCYTES NFR BLD AUTO: 23 %
Lab: NORMAL
MCH RBC QN AUTO: 28.9 PG (ref 26.6–33)
MCHC RBC AUTO-ENTMCNC: 34 G/DL (ref 31.5–35.7)
MCV RBC AUTO: 85 FL (ref 79–97)
MONOCYTES # BLD AUTO: 0.4 X10E3/UL (ref 0.1–0.9)
MONOCYTES NFR BLD AUTO: 4 %
NEUTROPHILS # BLD AUTO: 6.3 X10E3/UL (ref 1.4–7)
NEUTROPHILS NFR BLD AUTO: 71 %
PLATELET # BLD AUTO: 293 X10E3/UL (ref 150–379)
POTASSIUM SERPL-SCNC: 4.5 MMOL/L (ref 3.5–5.2)
PROT SERPL-MCNC: 7 G/DL (ref 6–8.5)
RBC # BLD AUTO: 4.22 X10E6/UL (ref 3.77–5.28)
SODIUM SERPL-SCNC: 138 MMOL/L (ref 134–144)
TRIGL SERPL-MCNC: 406 MG/DL (ref 0–149)
VLDLC SERPL CALC-MCNC: ABNORMAL MG/DL (ref 5–40)
WBC # BLD AUTO: 8.9 X10E3/UL (ref 3.4–10.8)

## 2018-10-22 ENCOUNTER — HOSPITAL ENCOUNTER (OUTPATIENT)
Dept: MAMMOGRAPHY | Age: 68
Discharge: HOME | End: 2018-10-22
Attending: FAMILY MEDICINE
Payer: MEDICARE

## 2018-10-22 DIAGNOSIS — Z12.39 BREAST CANCER SCREENING: ICD-10-CM

## 2018-10-22 PROCEDURE — 77067 SCR MAMMO BI INCL CAD: CPT

## 2019-04-02 DIAGNOSIS — I10 ESSENTIAL HYPERTENSION: ICD-10-CM

## 2019-04-08 RX ORDER — AMLODIPINE BESYLATE 10 MG/1
TABLET ORAL
Qty: 90 TAB | Refills: 1 | Status: SHIPPED | OUTPATIENT
Start: 2019-04-08 | End: 2019-05-09 | Stop reason: SDUPTHER

## 2019-04-08 RX ORDER — LOSARTAN POTASSIUM 100 MG/1
TABLET ORAL
Qty: 90 TAB | Refills: 1 | Status: SHIPPED | OUTPATIENT
Start: 2019-04-08 | End: 2019-05-09 | Stop reason: SDUPTHER

## 2019-05-09 DIAGNOSIS — I10 ESSENTIAL HYPERTENSION: ICD-10-CM

## 2019-05-09 NOTE — TELEPHONE ENCOUNTER
Patients daughter is calling to get a refill on the patients BP medication. Requested Prescriptions     Pending Prescriptions Disp Refills    losartan (COZAAR) 100 mg tablet 90 Tab 1    amLODIPine (NORVASC) 10 mg tablet 90 Tab 1     Patient has been out of this medication for two months.

## 2019-05-10 RX ORDER — AMLODIPINE BESYLATE 10 MG/1
TABLET ORAL
Qty: 90 TAB | Refills: 0 | Status: SHIPPED | OUTPATIENT
Start: 2019-05-10 | End: 2019-08-14 | Stop reason: SDUPTHER

## 2019-05-10 RX ORDER — LOSARTAN POTASSIUM 100 MG/1
TABLET ORAL
Qty: 90 TAB | Refills: 0 | Status: SHIPPED | OUTPATIENT
Start: 2019-05-10 | End: 2019-08-14 | Stop reason: SDUPTHER

## 2019-05-10 NOTE — TELEPHONE ENCOUNTER
Needs appointment for follow-up on Diabetes and Blood pressure. #90 day supply sent to pharmacy. Please call and schedule an appointment.

## 2019-05-13 NOTE — TELEPHONE ENCOUNTER
I called and left a message with patient letting her know that her BP medication has been filled and sent to pharmacy for pickup. I also let her know that she needs to schedule an appt for follow up on her blood pressure and diabetes.

## 2019-08-14 ENCOUNTER — TELEPHONE (OUTPATIENT)
Dept: FAMILY MEDICINE CLINIC | Age: 69
End: 2019-08-14

## 2019-08-14 DIAGNOSIS — I10 ESSENTIAL HYPERTENSION: Primary | ICD-10-CM

## 2019-08-14 RX ORDER — AMLODIPINE BESYLATE 10 MG/1
10 TABLET ORAL DAILY
Qty: 90 TAB | Refills: 0 | Status: SHIPPED | OUTPATIENT
Start: 2019-08-14 | End: 2019-10-01 | Stop reason: SDUPTHER

## 2019-08-14 RX ORDER — LOSARTAN POTASSIUM 100 MG/1
100 TABLET ORAL DAILY
Qty: 90 TAB | Refills: 0 | Status: SHIPPED | OUTPATIENT
Start: 2019-08-14 | End: 2019-10-01 | Stop reason: SDUPTHER

## 2019-08-14 NOTE — TELEPHONE ENCOUNTER
Mackenzie Hatchet returned call, informed her medication was sent and that a wellness is needed. Wellness scheduled for first available with Dr. Ramesh Fraga.

## 2019-08-14 NOTE — TELEPHONE ENCOUNTER
Rx refilled. Patient needs to schedule annual wellness and labs within the next 8 weeks. Please schedule.

## 2019-08-14 NOTE — TELEPHONE ENCOUNTER
----- Message from Marie Guidry sent at 8/13/2019  6:03 PM EDT -----  Regarding: Dr Jaki Valles (if not patient): Luis Huertas      Relationship of caller (if not patient): daughter      Best contact number(s):[phone]       Name of medication and dosage if known: blood pressure medication      Is patient out of this medication (yes/no): yes       Pharmacy name: [id number] listed in chart? (yes/no):  Pharmacy phone number:       Details to clarify the request: pt is completely out of medication and need a refill ARUNA Guidry

## 2019-10-01 ENCOUNTER — HOSPITAL ENCOUNTER (OUTPATIENT)
Dept: LAB | Age: 69
Discharge: HOME | End: 2019-10-01
Payer: MEDICARE

## 2019-10-01 ENCOUNTER — OFFICE VISIT (OUTPATIENT)
Dept: FAMILY MEDICINE CLINIC | Age: 69
End: 2019-10-01

## 2019-10-01 VITALS
RESPIRATION RATE: 17 BRPM | DIASTOLIC BLOOD PRESSURE: 72 MMHG | SYSTOLIC BLOOD PRESSURE: 114 MMHG | HEIGHT: 63 IN | TEMPERATURE: 97.8 F | WEIGHT: 160 LBS | BODY MASS INDEX: 28.35 KG/M2 | HEART RATE: 90 BPM | OXYGEN SATURATION: 96 %

## 2019-10-01 DIAGNOSIS — I10 ESSENTIAL HYPERTENSION: ICD-10-CM

## 2019-10-01 DIAGNOSIS — Z71.89 ADVANCED DIRECTIVES, COUNSELING/DISCUSSION: ICD-10-CM

## 2019-10-01 DIAGNOSIS — Z13.31 SCREENING FOR DEPRESSION: ICD-10-CM

## 2019-10-01 DIAGNOSIS — Z00.00 MEDICARE ANNUAL WELLNESS VISIT, SUBSEQUENT: Primary | ICD-10-CM

## 2019-10-01 DIAGNOSIS — Z13.39 SCREENING FOR ALCOHOLISM: ICD-10-CM

## 2019-10-01 DIAGNOSIS — E11.21 TYPE 2 DIABETES WITH NEPHROPATHY (HCC): ICD-10-CM

## 2019-10-01 DIAGNOSIS — D50.9 IRON DEFICIENCY ANEMIA, UNSPECIFIED IRON DEFICIENCY ANEMIA TYPE: ICD-10-CM

## 2019-10-01 PROCEDURE — 85027 COMPLETE CBC AUTOMATED: CPT

## 2019-10-01 PROCEDURE — 82043 UR ALBUMIN QUANTITATIVE: CPT

## 2019-10-01 PROCEDURE — 80061 LIPID PANEL: CPT

## 2019-10-01 PROCEDURE — 80053 COMPREHEN METABOLIC PANEL: CPT

## 2019-10-01 PROCEDURE — 82728 ASSAY OF FERRITIN: CPT

## 2019-10-01 PROCEDURE — 83036 HEMOGLOBIN GLYCOSYLATED A1C: CPT

## 2019-10-01 PROCEDURE — 36415 COLL VENOUS BLD VENIPUNCTURE: CPT

## 2019-10-01 RX ORDER — AMLODIPINE BESYLATE 10 MG/1
10 TABLET ORAL DAILY
Qty: 90 TAB | Refills: 3 | Status: SHIPPED | OUTPATIENT
Start: 2019-10-01 | End: 2021-01-06

## 2019-10-01 RX ORDER — LOSARTAN POTASSIUM 100 MG/1
100 TABLET ORAL DAILY
Qty: 90 TAB | Refills: 3 | Status: SHIPPED | OUTPATIENT
Start: 2019-10-01 | End: 2021-01-06

## 2019-10-01 RX ORDER — ACETAMINOPHEN 500 MG
TABLET ORAL
COMMUNITY

## 2019-10-01 RX ORDER — ALBUTEROL SULFATE 90 UG/1
2 AEROSOL, METERED RESPIRATORY (INHALATION)
Qty: 1 INHALER | Refills: 5 | Status: SHIPPED | OUTPATIENT
Start: 2019-10-01 | End: 2021-02-18 | Stop reason: SDUPTHER

## 2019-10-01 NOTE — PROGRESS NOTES
Chief Complaint   Patient presents with   [address] [place] [address] Annual Wellness Visit     1. Have you been to the ER, urgent care clinic since your last visit? Hospitalized since your last visit? No    2. Have you seen or consulted any other health care providers outside of the [address] [place] [address] since your last visit? Include any pap smears or colon screening. No    Bone density screening--    Eye exam--2 years ago--    Shingles vaccine--    Declined flu vaccine and tetanus vaccine      General Health Questions   -During the past 4 weeks:   -how would you rate your health in general? Good   -how often have you been bothered by feeling dizzy when standing up? -how much have you been bothered by bodily pain? not   -Have you noticed any hearing difficulties? yes   -has your physical and emotional health limited your social activities with family or friends? Emotional Health Questions   -Do you have a history of depression, anxiety, or emotional problems?   -Over the past 2 weeks, have you felt down, depressed or hopeless?   -Over the past 2 weeks, have you felt little interest or pleasure in doing things? Health Habits   Please describe your diet habits:   Do you get 5 servings of fruits or vegetables daily? no  Do you exercise regularly? no    Activities of Daily Living and Functional Status   -Do you need help with eating, walking, dressing, bathing, toileting, the phone, transportation, shopping, preparing meals, housework, laundry, medications or managing money? no  -In the past four weeks, was someone available to help you if you needed and wanted help with anything? no  -Are you confident are you that you can control and manage most of your health problems?   -Have you been given information to help you keep track of your medications? yes  -How often do you have trouble taking your medications as prescribed? never    Fall Risk and Home Safety   Have you fallen 2 or more times in the past year?  no  Does your home have rugs in the hallways? yes, Do you have grab bars in the bathrooms?  no, Does your home have handrails on the stairs? no, Do you have adequate lighting in your home? Do you have smoke detectors and check them regularly? yes  Do you have difficulties driving a car/vehicle?  yes  Do you always fasten your seat belt when you are in a car? yes

## 2019-10-01 NOTE — ACP (ADVANCE CARE PLANNING)
Advance Care Planning    Advance Care Planning (ACP) Provider Conversation Snapshot    Date of ACP Conversation: 10/1/19  Persons included in Conversation:  patient  Length of ACP Conversation in minutes:  <16 minutes (Non-Billable)    Authorized Decision Maker (if patient is incapable of making informed decisions): This person is: Other Legally Authorized Decision Maker (e.g. Next of Kin)    Extended Emergency Contact Information  Primary Emergency Contact: [facility] Phone: [phone]  Relation: None          For Patients with Decision Making Capacity:   Values/Goals: Exploration of values, goals, and preferences if recovery is not expected, even with continued medical treatment in the event of:  Imminent death  Severe, permanent brain injury  \"In these circumstances, what matters most to you? \"  Care focused more on comfort or quality of life.     Conversation Outcomes / Follow-Up Plan:   Recommended communicating the plan and making copies for the healthcare agent, personal physician, and others as appropriate (e.g., health system)      Full Code

## 2019-10-01 NOTE — PATIENT INSTRUCTIONS
Medicare Wellness Visit, Female The best way to live healthy is to have a lifestyle where you eat a well-balanced diet, exercise regularly, limit alcohol use, and quit all forms of tobacco/nicotine, if applicable. Regular preventive services are another way to keep healthy. Preventive services (vaccines, screening tests, monitoring & exams) can help personalize your care plan, which helps you manage your own care. Screening tests can find health problems at the earliest stages, when they are easiest to treat. Chau Richardson follows the current, evidence-based guidelines published by the [facility] ([facility]) when recommending preventive services for our patients. Because we follow these guidelines, sometimes recommendations change over time as research supports it. (For example, mammograms used to be recommended annually. Even though Medicare will still pay for an annual mammogram, the newer guidelines recommend a mammogram every two years for women of average risk.) Of course, you and your doctor may decide to screen more often for some diseases, based on your risk and your health status. Preventive services for you include: - Medicare offers their members a free annual wellness visit, which is time for you and your primary care provider to discuss and plan for your preventive service needs. Take advantage of this benefit every year! 
-All adults over the age of 72 should receive the recommended pneumonia vaccines. Current USPSTF guidelines recommend a series of two vaccines for the best pneumonia protection.  
-All adults should have a flu vaccine yearly and a tetanus vaccine every 10 years. All adults age 61 and older should receive a shingles vaccine once in their lifetime.   
-A bone mass density test is recommended when a woman turns 65 to screen for osteoporosis. This test is only recommended one time, as a screening. Some providers will use this same test as a disease monitoring tool if you already have osteoporosis. -All adults age 38-68 who are overweight should have a diabetes screening test once every three years.  
-Other screening tests and preventive services for persons with diabetes include: an eye exam to screen for diabetic retinopathy, a kidney function test, a foot exam, and stricter control over your cholesterol.  
-Cardiovascular screening for adults with routine risk involves an electrocardiogram (ECG) at intervals determined by your doctor.  
-Colorectal cancer screenings should be done for adults age 54-65 with no increased risk factors for colorectal cancer. There are a number of acceptable methods of screening for this type of cancer. Each test has its own benefits and drawbacks. Discuss with your doctor what is most appropriate for you during your annual wellness visit. The different tests include: colonoscopy (considered the best screening method), a fecal occult blood test, a fecal DNA test, and sigmoidoscopy. -Breast cancer screenings are recommended every other year for women of normal risk, age 54-69. 
-Cervical cancer screenings for women over age 72 are only recommended with certain risk factors.  
-All adults born between [facility] should be screened once for Hepatitis C. Here is a list of your current Health Maintenance items (your personalized list of preventive services) with a due date: 
Health Maintenance Due Topic Date Due  Eye Exam  12/15/1960  
 DTaP/Tdap/Td  (1 - Tdap) 12/15/1971  Shingles Vaccine (1 of 2) 12/15/2000  Glaucoma Screening   12/15/2015  Bone Mineral Density   12/15/2015  Pneumococcal Vaccine (1 of 2 - PCV13) 12/15/2015 [facility] Annual Well Visit  10/26/2018  Hemoglobin A1C    04/16/2019  Flu Vaccine  08/01/2019  Albumin Urine Test  10/16/2019  Cholesterol Test   10/16/2019 [facility] Diabetic Foot Care  10/25/2019 Learning About Breast Cancer Screening What is breast cancer screening? Breast cancer occurs when cells that are not normal grow in one or both of your breasts. Screening tests can help find breast cancer early. Cancer is easier to treat when it's found early. Having concerns about breast cancer is common. That's why it's important to talk with your doctor about when to start and how often to get screened for breast cancer. How is breast cancer screening done? Several screening tests can be used to check for breast cancer. · Mammograms check for signs of cancer using X-rays. They can show tumors that are too small for you or your doctor to feel. During a mammogram, a machine squeezes your breasts to make them flatter and easier to X-ray. At least two pictures are taken of each breast. One is taken from the top and one from the side. · 3-D mammograms are also called digital breast tomosynthesis. Your breast is positioned on a flat plate. A top plate is pressed against your breast to keep it in position. The X-ray arm then moves in an arc above the breast and takes many pictures. A computer uses these X-rays to create a three-dimensional image. · Clinical breast exams are a doctor's exam. Your doctor carefully feels your breasts and under your arms to check for lumps or other changes. After the screening, your doctor will tell you the results. You will also be told if you need any follow-up tests. When should you get screened? Talk with your doctor about when you should start being tested for breast cancer. How often you get tested and the kind of tests you get will depend on your age and your risk. The guidelines that follow are for women who have an average risk for breast cancer. If you have a higher risk for breast cancer, such as having a family history of breast cancer in multiple relatives or at a young age, your doctor may recommend different screening for you. · Ages 21 to 44: Some experts recommend that women have a clinical breast exam every 3 years, starting at age 21. Ask your doctor how often you should have this test. If you have a high risk for breast cancer, talk with your doctor about when to start yearly mammograms and other screening tests. · Ages 36 and older: Talk with your doctor about how often you should have mammograms and clinical breast exams. What is your risk for breast cancer? If you don't already know your risk of breast cancer, you can ask your doctor about it. You can also look it up at www.cancer.gov/bcrisktool/. If your doctor says that you have a high or very high risk, ask about ways to reduce your risk. These could include getting extra screening, taking medicine, or having surgery. If you have a strong family history of breast cancer, ask your doctor about genetic testing. What steps can you take to stay healthy? Some things that increase your risk of breast cancer, such as your age and being female, cannot be controlled. But you can do some things to stay as healthy as you can. · Learn what your breasts normally look and feel like. If you notice any changes, tell your doctor. · Drink alcohol wisely. Your risk goes up the more you drink. For the best health, women should have no more than 1 drink a day or 7 drinks a week. · If you smoke, quit. When you quit smoking, you lower your chances of getting many types of cancer. You can also do your best to eat well, be active, and stay at a healthy weight. Eating healthy foods and being active every day, as well as staying at a healthy weight, may help prevent cancer. Where can you learn more? Go to http://katerin-celina.info/. Enter A118 in the search box to learn more about \"Learning About Breast Cancer Screening. \" Current as of: March 28, 2018 Content Version: 11.8 © 0112-0662 Healthwise, Incorporated.  Care instructions adapted under license by [facility] [address] (which disclaims liability or warranty for this information). If you have questions about a medical condition or this instruction, always ask your healthcare professional. [address] any warranty or liability for your use of this information. Colon Cancer Screening: Care Instructions Your Care Instructions Colorectal cancer occurs in the colon or rectum. That's the lower part of your digestive system. It is the second-leading cause of cancer deaths in the [place]. It often starts with small growths called polyps in the colon or rectum. Polyps are usually found with screening tests. Depending on the type of test, any polyps found may be removed during the tests. Colorectal cancer usually does not cause symptoms at first. But regular tests can help find it early, before it spreads and becomes harder to treat. Experts advise routine tests for colon cancer for people starting at age 48. And they advise people with a higher risk of colon cancer to get tested sooner. Talk with your doctor about when you should start testing. Discuss which tests you need. Follow-up care is a key part of your treatment and safety. Be sure to make and go to all appointments, and call your doctor if you are having problems. It's also a good idea to know your test results and keep a list of the medicines you take. What are the main screening tests for colon cancer? · Stool tests. These include the fecal immunochemical test (FIT) and the fecal occult blood test (FOBT). These tests check stool samples for signs of cancer. If your test is positive, you will need to have a colonoscopy. · Sigmoidoscopy. This test lets your doctor look at the lining of your rectum and the lowest part of your colon. Your doctor uses a lighted tube called a sigmoidoscope. This test can't find cancers or polyps in the upper part of your colon.  In some cases, polyps that are found can be removed. But if your doctor finds polyps, you will need to have a colonoscopy to check the upper part of your colon. · Colonoscopy. This test lets your doctor look at the lining of your rectum and your entire colon. The doctor uses a thin, flexible tool called a colonoscope. It can also be used to remove polyps or get a tissue sample (biopsy). What tests do you need? The following guidelines are for people age 48 and over who are not at high risk for colorectal cancer. You may have at least one of these tests as directed by your doctor. · Fecal immunochemical test (FIT) or fecal occult blood test (FOBT) every year · Sigmoidoscopy every 5 years · Colonoscopy every 10 years If you are age 68 to 80, you can work with your doctor to decide if screening is a good option. If you are age 80 or older, your doctor will likely advise that screening is not helpful. Talk with your doctor about when you need to be tested. And discuss which tests are right for you. Your doctor may recommend earlier or more frequent testing if you: 
· Have had colorectal cancer before. · Have had colon polyps. · Have symptoms of colorectal cancer. These include blood in your stool and changes in your bowel habits. · Have a parent, brother or sister, or child with colon polyps or colorectal cancer. · Have a bowel disease. This includes ulcerative colitis and Crohn's disease. · Have a rare polyp syndrome that runs in families, such as familial adenomatous polyposis (FAP). · Have had radiation treatments to the belly or pelvis. When should you call for help? Watch closely for changes in your health, and be sure to contact your doctor if: 
  · You have any changes in your bowel habits.  
  · You have any problems. Where can you learn more? Go to http://katerin-celina.info/. Enter M541 in the search box to learn more about \"Colon Cancer Screening: Care Instructions. \" Current as of: March 28, 2018 Content Version: 11.8 © 2196-7506 [employer]. Care instructions adapted under license by [employer] (which disclaims liability or warranty for this information). If you have questions about a medical condition or this instruction, always ask your healthcare professional. [address] any warranty or liability for your use of this information. Osteoporosis: Care Instructions Your Care Instructions Osteoporosis causes bones to become thin and weak. It is much more common in women than in men. Osteoporosis may be very advanced before you know you have it. Sometimes the first sign is a broken bone in the hip, spine, or wrist or sudden pain in your middle or lower back. Follow-up care is a key part of your treatment and safety. Be sure to make and go to all appointments, and call your doctor if you are having problems. It's also a good idea to know your test results and keep a list of the medicines you take. How can you care for yourself at home? · Your doctor may prescribe a bisphosphonate, such as risedronate (Actonel) or alendronate (Fosamax), for osteoporosis. If you are taking one of these medicines by mouth: 
? Take your medicine with a full glass of water when you first get up in the morning. ? Do not lie down, eat, drink a beverage, or take any other medicine for at least 30 minutes after taking the drug. This helps prevent stomach problems. ? Do not take your medicine late in the day if you forgot to take it in the morning. Skip it, and take the usual dose the next morning. ? If you have side effects, tell your doctor. He or she may prescribe another medicine. · Get enough calcium and vitamin D. The [place] of Medicine recommends adults younger than age 46 need 1,000 mg of calcium and 600 IU of vitamin D each day.  Women ages 46 to 79 need 1,200 mg of calcium and 600 IU of vitamin D each day. Men ages 46 to 79 need 1,000 mg of calcium and 600 IU of vitamin D each day. Adults 71 and older need 1,200 mg of calcium and 800 IU of vitamin D each day. ? Eat foods rich in calcium, like yogurt, cheese, milk, and dark green vegetables. This is a good way to get the calcium you need. You can get vitamin D from eggs, fatty fish, cereal, and milk. ? Talk to your doctor about taking a calcium plus vitamin D supplement. Be careful, though. Adults ages 23 to 48 should not get more than 2,500 mg of calcium and 4,000 IU of vitamin D each day, whether it is from supplements and/or food. Adults ages 46 and older should not get more than 2,000 mg of calcium and 4,000 IU of vitamin D each day from supplements and/or food. · Limit alcohol to 2 drinks a day for men and 1 drink a day for women. Too much alcohol can cause health problems. · Do not smoke. Smoking puts you at a much higher risk for osteoporosis. If you need help quitting, talk to your doctor about stop-smoking programs and medicines. These can increase your chances of quitting for good. · Get regular bone-building exercise. Weight-bearing and resistance exercises keep bones healthy by working the muscles and bones against gravity. Start out at an exercise level that feels right for you. Add a little at a time until you can do the following: ? Do 30 minutes of weight-bearing exercise on most days of the week. Walking, jogging, stair climbing, and dancing are good choices. ? Do resistance exercises with weights or elastic bands 2 to 3 days a week. · Reduce your risk of falls: 
? Wear supportive shoes with low heels and nonslip soles. ? Use a cane or walker, if you need it. Use shower chairs and bath benches. Put in handrails on stairways, around your shower or tub area, and near the toilet. ? Keep stairs, porches, and walkways well lit. Use night-lights. ? Remove throw rugs and other objects that are in the way. ? Avoid icy, wet, or slippery surfaces. ? Keep a cordless phone and a flashlight with new batteries by your bed. When should you call for help? Watch closely for changes in your health, and be sure to contact your doctor if you have any problems. Where can you learn more? Go to http://katerin-celina.info/. Enter K100 in the search box to learn more about \"Osteoporosis: Care Instructions. \" Current as of: November 7, 2018 Content Version: 12.2 © 4571-6061 [employer]. Care instructions adapted under license by [employer] (which disclaims liability or warranty for this information). If you have questions about a medical condition or this instruction, always ask your healthcare professional. [address] any warranty or liability for your use of this information. Advance Care Planning: Care Instructions Your Care Instructions It can be hard to live with an illness that cannot be cured. But if your health is getting worse, you may want to make decisions about end-of-life care. Planning for the end of your life does not mean that you are giving up. It is a way to make sure that your wishes are met. Clearly stating your wishes can make it easier for your loved ones. Making plans while you are still able may also ease your mind and make your final days less stressful and more meaningful. Follow-up care is a key part of your treatment and safety. Be sure to make and go to all appointments, and call your doctor if you are having problems. It's also a good idea to know your test results and keep a list of the medicines you take. What can you do to plan for the end of life? · You can bring these issues up with your doctor. You do not need to wait until your doctor starts the conversation. You might start with \"I would not be willing to live with . Geofm Dowse Geofm Dowse Geofm Dowse \" When you complete this sentence it helps your doctor understand your wishes. · Talk openly and honestly with your doctor. This is the best way to understand the decisions you will need to make as your health changes. Know that you can always change your mind. · Ask your doctor about commonly used life-support measures. These include tube feedings, breathing machines, and fluids given through a vein (IV). Understanding these treatments will help you decide whether you want them. · You may choose to have these life-supporting treatments for a limited time. This allows a trial period to see whether they will help you. You may also decide that you want your doctor to take only certain measures to keep you alive. It is important to spell out these conditions so that your doctor and family understand them. · Talk to your doctor about how long you are likely to live. He or she may be able to give you an idea of what usually happens with your specific illness. · Think about preparing papers that state your wishes. This way there will not be any confusion about what you want. You can change your instructions at any time. Which papers should you prepare? Advance directives are legal papers that tell doctors how you want to be cared for at the end of your life. You do not need a  to write these papers. Ask your doctor or your state [facility] department for information on how to write your advance directives. They may have the forms for each of these types of papers. Make sure your doctor has a copy of these on file, and give a copy to a family member or close friend. · Consider a do-not-resuscitate order (DNR). This order asks that no extra treatments be done if your heart stops or you stop breathing. Extra treatments may include cardiopulmonary resuscitation (CPR), electrical shock to restart your heart, or a machine to breathe for you. If you decide to have a DNR order, ask your doctor to explain and write it. Place the order in your home where everyone can easily see it. · Consider a living will. A living will explains your wishes about life support and other treatments at the end of your life if you become unable to speak for yourself. Living [place] tell doctors to use or not use treatments that would keep you alive. You must have one or two witnesses or a notary present when you sign this form. · Consider a durable power of  for health care. This allows you to name a person to make decisions about your care if you are not able to. Most people ask a close friend or family member. Talk to this person about the kinds of treatments you want and those that you do not want. Make sure this person understands your wishes. These legal papers are simple to change. Tell your doctor what you want to change, and ask him or her to make a note in your medical file. Give your family updated copies of the papers. Where can you learn more? Go to http://katerin-celina.info/. Enter P184 in the search box to learn more about \"Advance Care Planning: Care Instructions. \" Current as of: November 17, 2016 Content Version: 11.3 © 5253-3312 [employer]. Care instructions adapted under license by [employer] (which disclaims liability or warranty for this information). If you have questions about a medical condition or this instruction, always ask your healthcare professional. [place] [address] any warranty or liability for your use of this information. Preventing Falls: Care Instructions Your Care Instructions Getting around your home safely can be a challenge if you have injuries or health problems that make it easy for you to fall. Loose rugs and furniture in walkways are among the dangers for many older people who have problems walking or who have poor eyesight. People who have conditions such as arthritis, osteoporosis, or dementia also have to be careful not to fall. You can make your home safer with a few simple measures. Follow-up care is a key part of your treatment and safety. Be sure to make and go to all appointments, and call your doctor if you are having problems. It's also a good idea to know your test results and keep a list of the medicines you take. How can you care for yourself at home? Taking care of yourself · You may get dizzy if you do not drink enough water. To prevent dehydration, drink plenty of fluids, enough so that your urine is light yellow or clear like water. Choose water and other caffeine-free clear liquids. If you have kidney, heart, or liver disease and have to limit fluids, talk with your doctor before you increase the amount of fluids you drink. · Exercise regularly to improve your strength, muscle tone, and balance. Walk if you can. Swimming may be a good choice if you cannot walk easily. · Have your vision and hearing checked each year or any time you notice a change. If you have trouble seeing and hearing, you might not be able to avoid objects and could lose your balance. · Know the side effects of the medicines you take. Ask your doctor or pharmacist whether the medicines you take can affect your balance. Sleeping pills or sedatives can affect your balance. · Limit the amount of alcohol you drink. Alcohol can impair your balance and other senses. · Ask your doctor whether calluses or corns on your feet need to be removed. If you wear loose-fitting shoes because of calluses or corns, you can lose your balance and fall. · Talk to your doctor if you have numbness in your feet. Preventing falls at home · Remove raised doorway thresholds, throw rugs, and clutter. Repair loose carpet or raised areas in the floor. · Move furniture and electrical cords to keep them out of walking paths. · Use nonskid floor wax, and wipe up spills right away, especially on ceramic tile floors. · If you use a walker or cane, put rubber tips on it. If you use crutches, clean the bottoms of them regularly with an abrasive pad, such as steel wool. · Keep your house well lit, especially [place], and outside walkways. Use night-lights in areas such as hallways and bathrooms. Add extra light switches or use remote switches (such as switches that go on or off when you clap your hands) to make it easier to turn lights on if you have to get up during the night. · Install sturdy handrails on stairways. · Move items in your cabinets so that the things you use a lot are on the lower shelves (about waist level). · Keep a cordless phone and a flashlight with new batteries by your bed. If possible, put a phone in each of the main rooms of your house, or carry a cell phone in case you fall and cannot reach a phone. Or, you can wear a device around your neck or wrist. You push a button that sends a signal for help. · Wear low-heeled shoes that fit well and give your feet good support. Use footwear with nonskid soles. Check the heels and soles of your shoes for wear. Repair or replace worn heels or soles. · Do not wear socks without shoes on wood floors. · Walk on the grass when the sidewalks are slippery. If you live in an area that gets snow and ice in the winter, sprinkle salt on slippery steps and sidewalks. Preventing falls in the bath · Install grab bars and nonskid mats inside and outside your shower or tub and near the toilet and sinks. · Use shower chairs and bath benches. · Use a hand-held shower head that will allow you to sit while showering. · Get into a tub or shower by putting the weaker leg in first. Get out of a tub or shower with your strong side first. 
· Repair loose toilet seats and consider installing a raised toilet seat to make getting on and off the toilet easier. · Keep your bathroom door unlocked while you are in the shower. Where can you learn more? Go to http://katerin-celina.info/. Enter [id number] [id number] in the search box to learn more about \"Preventing Falls: Care Instructions. \" Current as of: March 16, 2018 Content Version: 11.8 © 5592-6059 [employer]wise, [employer]. Care instructions adapted under license by [employer] (which disclaims liability or warranty for this information). If you have questions about a medical condition or this instruction, always ask your healthcare professional. [place] [address] any warranty or liability for your use of this information.

## 2019-10-02 LAB
ALBUMIN SERPL-MCNC: 4.7 G/DL (ref 3.6–4.8)
ALBUMIN/CREAT UR: 24.5 MG/G CREAT (ref 0–30)
ALBUMIN/GLOB SERPL: 1.8 {RATIO} (ref 1.2–2.2)
ALP SERPL-CCNC: 93 IU/L (ref 39–117)
ALT SERPL-CCNC: 23 IU/L (ref 0–32)
AST SERPL-CCNC: 18 IU/L (ref 0–40)
BILIRUB SERPL-MCNC: 0.4 MG/DL (ref 0–1.2)
BUN SERPL-MCNC: 13 MG/DL (ref 8–27)
BUN/CREAT SERPL: 20 (ref 12–28)
CALCIUM SERPL-MCNC: 9.9 MG/DL (ref 8.7–10.3)
CHLORIDE SERPL-SCNC: 98 MMOL/L (ref 96–106)
CHOLEST SERPL-MCNC: 233 MG/DL (ref 100–199)
CO2 SERPL-SCNC: 26 MMOL/L (ref 20–29)
CREAT SERPL-MCNC: 0.65 MG/DL (ref 0.57–1)
CREAT UR-MCNC: 81.7 MG/DL
ERYTHROCYTE [DISTWIDTH] IN BLOOD BY AUTOMATED COUNT: 13.3 % (ref 12.3–15.4)
EST. AVERAGE GLUCOSE BLD GHB EST-MCNC: 200 MG/DL
FERRITIN SERPL-MCNC: 75 NG/ML (ref 15–150)
GLOBULIN SER CALC-MCNC: 2.6 G/DL (ref 1.5–4.5)
GLUCOSE SERPL-MCNC: 161 MG/DL (ref 65–99)
HBA1C MFR BLD: 8.6 % (ref 4.8–5.6)
HCT VFR BLD AUTO: 38.2 % (ref 34–46.6)
HDLC SERPL-MCNC: 38 MG/DL
HGB BLD-MCNC: 12.9 G/DL (ref 11.1–15.9)
INTERPRETATION, 910389: NORMAL
LDLC SERPL CALC-MCNC: ABNORMAL MG/DL (ref 0–99)
Lab: NORMAL
Lab: NORMAL
MCH RBC QN AUTO: 30.2 PG (ref 26.6–33)
MCHC RBC AUTO-ENTMCNC: 33.8 G/DL (ref 31.5–35.7)
MCV RBC AUTO: 90 FL (ref 79–97)
MICROALBUMIN UR-MCNC: 20 UG/ML
PLATELET # BLD AUTO: 273 X10E3/UL (ref 150–450)
POTASSIUM SERPL-SCNC: 4.4 MMOL/L (ref 3.5–5.2)
PROT SERPL-MCNC: 7.3 G/DL (ref 6–8.5)
RBC # BLD AUTO: 4.27 X10E6/UL (ref 3.77–5.28)
SODIUM SERPL-SCNC: 141 MMOL/L (ref 134–144)
TRIGL SERPL-MCNC: 436 MG/DL (ref 0–149)
VLDLC SERPL CALC-MCNC: ABNORMAL MG/DL (ref 5–40)
WBC # BLD AUTO: 7 X10E3/UL (ref 3.4–10.8)

## 2019-10-03 ENCOUNTER — TELEPHONE (OUTPATIENT)
Dept: FAMILY MEDICINE CLINIC | Age: 69
End: 2019-10-03

## 2019-10-03 NOTE — TELEPHONE ENCOUNTER
Patients blood sugar has significantly increased. I'd like to get her in to discuss her diabetes and treatment.

## 2019-10-03 NOTE — TELEPHONE ENCOUNTER
I called and left a voicemail for patient wanting to schedule an appt to discuss diabetes treatment. I let her know that her blood sugars have gone up significantly and he wanted to discuss these findings with you in clinic. Expecting a call back from patient to schedule appt.

## 2019-10-10 NOTE — TELEPHONE ENCOUNTER
Attempted to leave another phone message for patient letting her know that she needs to schedule an appt with Dr Steffany Horvath to discuss elevated sugar levels and possible diabetic treatment. Expecting a call back.

## 2019-10-10 NOTE — TELEPHONE ENCOUNTER
I spoke with patient and let her know that Dr Yumiko Petersen wanted to connect with her regarding her elevated sugar levels as well as discussing diabetic treatment if needed. Patient has been  Scheduled to see Dr Yumiko Petersen on Tuesday the 29th of October.

## 2019-10-22 ENCOUNTER — TELEPHONE (OUTPATIENT)
Dept: DIABETES SERVICES | Age: 69
End: 2019-10-22

## 2019-10-28 ENCOUNTER — TELEPHONE (OUTPATIENT)
Dept: FAMILY MEDICINE CLINIC | Age: 69
End: 2019-10-28

## 2019-10-28 NOTE — TELEPHONE ENCOUNTER
Patient's daughter calling(NOT ON HIPPA) Josué Reyes,  Called to verify appt of tomorrow with Dr Barbie Gee. I did make her aware that the appointment had been canceled as Dr Barbie Gee will not be in clinic and need to be rescheduled. She states her mother is confused about appointment and wants to know why she's coming in. The patient got on the line and states she forgot to add her daughter to HIPPA and explained to just make sure to add at next visit. She expressed understanding. Asking that she be contacted.     [phone]

## 2019-10-31 NOTE — PROGRESS NOTES
Libertad Rehman  76 y.o. female  1950  [address] [facility] [id number]  [address] [place] [address] Dr: Progress Note  Valentino Babe, MD       Encounter Date: 10/1/2019    Chief Complaint   Patient presents with    Annual Wellness Visit     History of Present Illness   Libertad Rehman is a 76 y.o. female who presents to clinic today for the Subsequent Medicare Annual Wellness Visit providing Personalized Prevention Plan Services (PPPS) (Performed 12 months after initial AWV and PPPS )       Concerns today   (Patient understands that medical problems addressed today may incur additional cost as this is a preventive visit)  -Follow-up on HTN, DM and Anemia    HTN: Is taking amlodipine and losartan. Denies chest pain, palpitations, lightheadedness,Dyspnea    DMII: diet controlled. Denies polyuria, polydipsia, polyphagia    Anemia: Generally fatigues easily. No hematochezia, melena noted. Specialists/Care Team   Karen More. Kavitha Guardado has established care with the following healthcare providers:  Patient Care Team:  Daniel Lopez MD as PCP - General (Family Practice)  Daniel Lopez MD as PCP - REHABILITATION [facility] Provider      Depression Risk Factor Screening:     PHQ-2 Negative      Alcohol Risk Factor Screening:     Alcohol Risk Factor Screening:   Do you average 1 drink per night or more than 7 drinks a week:  No    On any one occasion in the past three months have you have had more than 3 drinks containing alcohol:  No      Functional Ability and Level of Safety:     Hearing Loss   The patient needs further evaluation. Activities of Daily Living   Self-care. Requires assistance with: no ADLs    Fall Risk     Fall Risk Assessment, last 12 mths 10/30/2019   Able to walk? Yes   Fall in past 12 months?  No       Patient is not abused      Advice/Referrals/Counseling (as indicated)   Education and counseling provided for any problems identified above: Preventive Services   There is no immunization history for the selected administration types on file for this patient. Health Maintenance   Topic    EYE EXAM RETINAL OR DILATED     DTaP/Tdap/Td series (1 - Tdap)    Shingrix Vaccine Age 50> (1 of 2)    GLAUCOMA SCREENING Q2Y     Bone Densitometry (Dexa) Screening     Pneumococcal 65+ years (1 of 2 - PCV13)    Influenza Age 5 to Adult     FOOT EXAM Q1     HEMOGLOBIN A1C Q6M     MEDICARE YEARLY EXAM     MICROALBUMIN Q1     LIPID PANEL Q1     BREAST CANCER SCRN MAMMOGRAM     COLONOSCOPY     Hepatitis C Screening        Discussion of Advance Directive   Discussed with Misty Perez her ability to prepare and advance directive in the case that an injury or illness causes her to be unable to make health care decisions. Review of Systems   Review of Systems   Constitutional: Positive for malaise/fatigue. Negative for chills and fever. HENT: Negative for congestion and sore throat. Eyes: Negative for blurred vision and double vision. Respiratory: Negative for cough, shortness of breath and wheezing. Cardiovascular: Negative for chest pain and palpitations. Gastrointestinal: Negative for abdominal pain, constipation, diarrhea and nausea. Genitourinary: Negative for dysuria and hematuria. Musculoskeletal: Negative for back pain, falls and myalgias. Skin: Negative for rash. Neurological: Negative for dizziness, tremors and headaches. Psychiatric/Behavioral: Negative for depression. The patient is not nervous/anxious. All other systems reviewed and are negative. Vitals/Objective:     Vitals:    10/01/19 1042   BP: 114/72   Pulse: 90   Resp: 17   Temp: 97.8 °F (36.6 °C)   TempSrc: Oral   SpO2: 96%   Weight: 160 lb (72.6 kg)   Height: 5' 3\" (1.6 m)     Body mass index is 28.34 kg/m². General: Patient alert and oriented and in NAD  HEENT: PER/EOMI, no conjunctival pallor or scleral icterus.   No thyromegaly or cervical lymphadenopathy  Heart: Regular rate and rhythm, No murmurs, rubs or gallops. 2+ peripheral pulses  Lungs: Clear to auscultation bilaterally, no wheezing, rales or rhonchi  Abd: +BS, non-tender, non-distended  Ext: No edema  Skin: No rashes or lesions noted on exposed skin,  Psych: Appropriate mood and affect      Was the patient's timed Up & Go test unsteady or longer than 30 seconds? yes    Evaluation of Cognitive Function   Mood/affect:  happy  Orientation: Person, Place, Time and Situation  Appearance: age appropriate  Family member/caregiver input: n/a    No results found for this or any previous visit (from the past 24 hour(s)). Assessment and Plan:   1. Medicare annual wellness visit, subsequent  Jocelyne Guthrie was counseled on age-appropriate/ guideline-based risk prevention behaviors and screening for a 76y.o. year old   female . We also discussed adjustments in screening based on family history if necessary. Printed instructions for preventative screening guidelines and healthy behaviors given to patient with after visit summary. Declined immunizations. Known hx of fragility fracture. Recommend Bone density scan. Declined at this time. 2. Advanced directives, counseling/discussion  See ACP note    3. Screening for alcoholism  - UT ANNUAL ALCOHOL SCREEN 15 MIN    4. Screening for depression  - DEPRESSION SCREEN ANNUAL    5. Type 2 diabetes with nephropathy ([facility] [address]  Getting labs. Encouraged diabetic education  - HEMOGLOBIN A1C WITH EAG  - MICROALBUMIN, UR, RAND W/ MICROALB/CREAT RATIO  - LIPID PANEL  - REFERRAL TO DIABETIC EDUCATION; Standing    6. Essential hypertension  At goal.  Will continue cozaar and norvasc.  - CBC W/O DIFF  - METABOLIC PANEL, COMPREHENSIVE  - amLODIPine (NORVASC) 10 mg tablet; Take 1 Tab by mouth daily. Dispense: 90 Tab; Refill: 3  - losartan (COZAAR) 100 mg tablet; Take 1 Tab by mouth daily. Dispense: 90 Tab; Refill: 3    7.  Iron deficiency anemia, unspecified iron deficiency anemia type  - CBC W/O DIFF  - FERRITIN      I have discussed the diagnosis with the patient and the intended plan as seen in the above orders. she has expressed understanding. The patient has received an after-visit summary and questions were answered concerning future plans. I have discussed medication side effects and warnings with the patient as well. Follow-up and Dispositions  ·   Return in about 6 months (around 4/1/2020), or if symptoms worsen or fail to improve. Electronically Signed: Edwin Nuñez MD     History   Patients past medical, surgical and family histories were reviewed and updated.     Past Medical History:   Diagnosis Date    Diverticulosis 9/23/2015    Hard of hearing     Hypertension     Iron deficiency anemia     baseline 9.7 on 8/2015    PUD (peptic ulcer disease)     hospitalized at [facility], EGD showed a nonbleeding esophageal ulcer, multiple gastric ulcers    Pulmonary nodule 8/2/2015    CT chest: 2.8 cm focal airspace disease RUL, pna vs mass, referred to pulm with PET    Type 2 diabetes mellitus without complication, without long-term current use of insulin ([facility] [address] 9/12/2015     Past Surgical History:   Procedure Laterality Date    HX MALIGNANT SKIN LESION EXCISION Right under  eye    HX PARTIAL HYSTERECTOMY Bilateral      Family History   Problem Relation Age of Onset    Diabetes Mother     Hypertension Mother     Alzheimer Mother     Pacemaker Mother     Heart Attack Father      Social History     Socioeconomic History    Marital status:      Spouse name: Not on file    Number of children: Not on file    Years of education: Not on file    Highest education level: Not on file   Occupational History    Not on file   Social Needs    Financial resource strain: Not on file    Food insecurity:     Worry: Not on file     Inability: Not on file    Transportation needs:     Medical: Not on file     Non-medical: Not on file Tobacco Use    Smoking status: Former Smoker     Packs/day: 0.25     Years: 30.00     Pack years: 7.50     Last attempt to quit: 10/16/2016     Years since quitting: 3.0    Smokeless tobacco: Never Used   Substance and Sexual Activity    Alcohol use: Yes     Alcohol/week: 0.0 standard drinks     Comment: very rare    Drug use: No    Sexual activity: Not on file   Lifestyle    Physical activity:     Days per week: Not on file     Minutes per session: Not on file    Stress: Not on file   Relationships    Social connections:     Talks on phone: Not on file     Gets together: Not on file     Attends Sikh service: Not on file     Active member of club or organization: Not on file     Attends meetings of clubs or organizations: Not on file     Relationship status: Not on file    Intimate partner violence:     Fear of current or ex partner: Not on file     Emotionally abused: Not on file     Physically abused: Not on file     Forced sexual activity: Not on file   Other Topics Concern    Not on file   Social History Narrative    Not on file            Current Medications/Allergies     Current Outpatient Medications   Medication Sig Dispense Refill    acetaminophen (TYLENOL) 500 mg tablet Take  by mouth every six (6) hours as needed for Pain.  amLODIPine (NORVASC) 10 mg tablet Take 1 Tab by mouth daily. 90 Tab 3    losartan (COZAAR) 100 mg tablet Take 1 Tab by mouth daily. 90 Tab 3    albuterol (PROVENTIL HFA, VENTOLIN HFA, PROAIR HFA) 90 mcg/actuation inhaler Take 2 Puffs by inhalation every four (4) hours as needed for Wheezing. 1 Inhaler 5    naproxen (NAPROSYN) 500 mg tablet Take 1 Tab by mouth every twelve (12) hours as needed.  Take with food 30 Tab 0     No Known Allergies

## 2019-11-01 ENCOUNTER — TELEPHONE (OUTPATIENT)
Dept: FAMILY MEDICINE CLINIC | Age: 69
End: 2019-11-01

## 2019-11-01 NOTE — TELEPHONE ENCOUNTER
Attempted call, recording states party is not reachable. If patient calls, she needs appointment to discuss increasing blood sugars.

## 2020-04-30 ENCOUNTER — TELEPHONE (OUTPATIENT)
Dept: FAMILY MEDICINE CLINIC | Age: 70
End: 2020-04-30

## 2020-04-30 NOTE — TELEPHONE ENCOUNTER
----- Message from Charlee Acosta sent at 4/29/2020  5:14 PM EDT -----  Regarding: Corazon Blank MD/Telephone  General Message/Vendor Calls    Caller's first and last name:  Gilford Reason     Reason for call: Pt's daughter, Gilford Reason is requesting Dr. Ambika Flores to write a doctor's note for her employer, [address] [place] [address] stating that it would be best for the pt to stay in quarantine until the COVID-19 pandemic is lifted for health reason.        Callback required yes/no and why: Yes       Best contact number(s): [phone]      Details to clarify the request: N/A

## 2020-04-30 NOTE — LETTER
NOTIFICATION RETURN TO WORK / SCHOOL 
 
5/5/2020 11:57 AM 
 
Ms. So Choi [address] [facility] [address] To Whom It May Concern: 
 
So Choi is currently under the care of [address]. Given her age and medical history, Ms. Carolina Alvarez is at high risk for severe illness if she contracts the coronavirus. She is advised to reduce contact with the general public in an effort to reduce her risk of exposure. Please consider allowing Ms. Carolina Alvarez to self-isolate/quarantine until additional restrictions are lifted. She is also advised to wear a mask while in public places. If there are questions or concerns please have the patient contact our office.  
 
 
 
Sincerely, 
 
 
Payal Blanton MD

## 2021-02-18 ENCOUNTER — VIRTUAL VISIT (OUTPATIENT)
Dept: FAMILY MEDICINE CLINIC | Age: 71
End: 2021-02-18

## 2021-02-18 RX ORDER — ALBUTEROL SULFATE 90 UG/1
2 AEROSOL, METERED RESPIRATORY (INHALATION)
Qty: 1 INHALER | Refills: 5 | Status: SHIPPED | OUTPATIENT
Start: 2021-02-18 | End: 2022-06-07 | Stop reason: SDUPTHER

## 2021-02-18 RX ORDER — ALBUTEROL SULFATE 90 UG/1
2 AEROSOL, METERED RESPIRATORY (INHALATION)
Qty: 1 INHALER | Refills: 5 | Status: SHIPPED | OUTPATIENT
Start: 2021-02-18 | End: 2021-02-18 | Stop reason: SDUPTHER

## 2021-02-18 NOTE — PROGRESS NOTES
Patient called to cancel appointment this AM.  I spoke with daughter to confirm. Patient needs refill for albuterol in the meantime. Patient does not have reliable Wifi and is in need for labs. Recommend next appointment be in-person. Aurora Heredia MD  2/18/2021 10:25 AM      This encounter was created in error - please disregard.

## 2021-02-19 ENCOUNTER — TELEPHONE (OUTPATIENT)
Dept: FAMILY MEDICINE CLINIC | Age: 71
End: 2021-02-19

## 2021-03-15 ENCOUNTER — TELEPHONE (OUTPATIENT)
Dept: FAMILY MEDICINE CLINIC | Age: 71
End: 2021-03-15

## 2021-03-15 DIAGNOSIS — E11.21 TYPE 2 DIABETES WITH NEPHROPATHY (HCC): Primary | ICD-10-CM

## 2021-03-15 NOTE — TELEPHONE ENCOUNTER
----- Message from [facility] sent at 3/12/2021 12:18 PM EST -----  Regarding: MD Pain/telephone  Appointment not available    Caller's first and last name and relationship to patient (if not the patient): Lizet/daughter       Best contact number:[phone]      Preferred date and time: earliest      Scheduled appointment date and time: N/A      Reason for appointment: Lab and cep      Details to clarify the request: Patients daughter stated that the doctor would like her mom to come into the office for lab at the earliest however, there are no appointments until may.       Paulina Webb

## 2021-04-01 DIAGNOSIS — I10 ESSENTIAL HYPERTENSION: ICD-10-CM

## 2021-04-01 RX ORDER — AMLODIPINE BESYLATE 10 MG/1
TABLET ORAL
Qty: 90 TAB | Refills: 0 | OUTPATIENT
Start: 2021-04-01

## 2021-04-01 RX ORDER — LOSARTAN POTASSIUM 100 MG/1
TABLET ORAL
Qty: 60 TAB | Refills: 0 | OUTPATIENT
Start: 2021-04-01

## 2021-04-01 NOTE — TELEPHONE ENCOUNTER
----- Message from Mona Pandya sent at 4/1/2021  1:22 PM EDT -----  Regarding: Dr. Gail June: [phone]  Medication Refill    Caller (if not patient): Lizet      Relationship of caller (if not patient): Daughter      Best contact number(s):[phone]      Name of medication and dosage if known: Losartan 100 mg, Amlodipine 10 mg      Is patient out of this medication (yes/no): Yes      Pharmacy name: [facility] [place], [address] listed in chart? (yes/no):  No  Pharmacy phone number: [phone]      Details to clarify the request: N/A      Mona Pandya

## 2021-04-06 NOTE — TELEPHONE ENCOUNTER
Per call from Adwoa Ball) with [facility],PT Daughter calling again, states that her mother is out of medication in emergency supply need to be sent to pharmacy told hold until appt of next week. Asking to be notified once addressed.

## 2021-04-07 RX ORDER — LOSARTAN POTASSIUM 100 MG/1
TABLET ORAL
Qty: 30 TAB | Refills: 0 | Status: SHIPPED | OUTPATIENT
Start: 2021-04-07 | End: 2021-05-04 | Stop reason: SDUPTHER

## 2021-04-07 RX ORDER — AMLODIPINE BESYLATE 10 MG/1
TABLET ORAL
Qty: 30 TAB | Refills: 0 | Status: SHIPPED | OUTPATIENT
Start: 2021-04-07 | End: 2021-05-04 | Stop reason: SDUPTHER

## 2021-04-13 ENCOUNTER — LAB ONLY (OUTPATIENT)
Dept: FAMILY MEDICINE CLINIC | Age: 71
End: 2021-04-13

## 2021-04-13 DIAGNOSIS — E11.21 TYPE 2 DIABETES WITH NEPHROPATHY (HCC): ICD-10-CM

## 2021-04-13 LAB
ANION GAP SERPL CALC-SCNC: 8 MMOL/L (ref 5–15)
BUN SERPL-MCNC: 16 MG/DL (ref 6–20)
BUN/CREAT SERPL: 31 (ref 12–20)
CALCIUM SERPL-MCNC: 9.1 MG/DL (ref 8.5–10.1)
CHLORIDE SERPL-SCNC: 100 MMOL/L (ref 97–108)
CHOLEST SERPL-MCNC: 308 MG/DL
CO2 SERPL-SCNC: 26 MMOL/L (ref 21–32)
CREAT SERPL-MCNC: 0.52 MG/DL (ref 0.55–1.02)
CREAT UR-MCNC: 64.5 MG/DL
ERYTHROCYTE [DISTWIDTH] IN BLOOD BY AUTOMATED COUNT: 13.2 % (ref 11.5–14.5)
EST. AVERAGE GLUCOSE BLD GHB EST-MCNC: 312 MG/DL
GLUCOSE SERPL-MCNC: 340 MG/DL (ref 65–100)
HBA1C MFR BLD: 12.5 % (ref 4–5.6)
HCT VFR BLD AUTO: 41.1 % (ref 35–47)
HDLC SERPL-MCNC: 30 MG/DL
HDLC SERPL: 10.3 {RATIO} (ref 0–5)
HGB BLD-MCNC: 13.2 G/DL (ref 11.5–16)
LDLC SERPL CALC-MCNC: ABNORMAL MG/DL (ref 0–100)
LDLC SERPL DIRECT ASSAY-MCNC: 90 MG/DL (ref 0–100)
LIPID PROFILE,FLP: ABNORMAL
MCH RBC QN AUTO: 30.3 PG (ref 26–34)
MCHC RBC AUTO-ENTMCNC: 32.1 G/DL (ref 30–36.5)
MCV RBC AUTO: 94.5 FL (ref 80–99)
MICROALBUMIN UR-MCNC: 6.82 MG/DL
MICROALBUMIN/CREAT UR-RTO: 106 MG/G (ref 0–30)
NRBC # BLD: 0 K/UL (ref 0–0.01)
NRBC BLD-RTO: 0 PER 100 WBC
PLATELET # BLD AUTO: 244 K/UL (ref 150–400)
PMV BLD AUTO: 10.8 FL (ref 8.9–12.9)
POTASSIUM SERPL-SCNC: 4 MMOL/L (ref 3.5–5.1)
RBC # BLD AUTO: 4.35 M/UL (ref 3.8–5.2)
SODIUM SERPL-SCNC: 134 MMOL/L (ref 136–145)
TRIGL SERPL-MCNC: 955 MG/DL (ref ?–150)
VLDLC SERPL CALC-MCNC: ABNORMAL MG/DL
WBC # BLD AUTO: 5.5 K/UL (ref 3.6–11)

## 2021-05-04 ENCOUNTER — OFFICE VISIT (OUTPATIENT)
Dept: FAMILY MEDICINE CLINIC | Age: 71
End: 2021-05-04
Payer: MEDICARE

## 2021-05-04 VITALS
SYSTOLIC BLOOD PRESSURE: 149 MMHG | TEMPERATURE: 97.5 F | WEIGHT: 148.6 LBS | HEIGHT: 63 IN | HEART RATE: 81 BPM | BODY MASS INDEX: 26.33 KG/M2 | DIASTOLIC BLOOD PRESSURE: 81 MMHG | OXYGEN SATURATION: 96 % | RESPIRATION RATE: 18 BRPM

## 2021-05-04 DIAGNOSIS — E11.21 TYPE 2 DIABETES WITH NEPHROPATHY (HCC): Primary | ICD-10-CM

## 2021-05-04 DIAGNOSIS — E78.1 HYPERTRIGLYCERIDEMIA: ICD-10-CM

## 2021-05-04 DIAGNOSIS — I10 ESSENTIAL HYPERTENSION: ICD-10-CM

## 2021-05-04 PROCEDURE — G8536 NO DOC ELDER MAL SCRN: HCPCS | Performed by: FAMILY MEDICINE

## 2021-05-04 PROCEDURE — 3017F COLORECTAL CA SCREEN DOC REV: CPT | Performed by: FAMILY MEDICINE

## 2021-05-04 PROCEDURE — 3046F HEMOGLOBIN A1C LEVEL >9.0%: CPT | Performed by: FAMILY MEDICINE

## 2021-05-04 PROCEDURE — G8510 SCR DEP NEG, NO PLAN REQD: HCPCS | Performed by: FAMILY MEDICINE

## 2021-05-04 PROCEDURE — G8754 DIAS BP LESS 90: HCPCS | Performed by: FAMILY MEDICINE

## 2021-05-04 PROCEDURE — 1090F PRES/ABSN URINE INCON ASSESS: CPT | Performed by: FAMILY MEDICINE

## 2021-05-04 PROCEDURE — G8753 SYS BP > OR = 140: HCPCS | Performed by: FAMILY MEDICINE

## 2021-05-04 PROCEDURE — G8400 PT W/DXA NO RESULTS DOC: HCPCS | Performed by: FAMILY MEDICINE

## 2021-05-04 PROCEDURE — G0463 HOSPITAL OUTPT CLINIC VISIT: HCPCS | Performed by: FAMILY MEDICINE

## 2021-05-04 PROCEDURE — 99214 OFFICE O/P EST MOD 30 MIN: CPT | Performed by: FAMILY MEDICINE

## 2021-05-04 PROCEDURE — G8419 CALC BMI OUT NRM PARAM NOF/U: HCPCS | Performed by: FAMILY MEDICINE

## 2021-05-04 PROCEDURE — 2022F DILAT RTA XM EVC RTNOPTHY: CPT | Performed by: FAMILY MEDICINE

## 2021-05-04 PROCEDURE — 1101F PT FALLS ASSESS-DOCD LE1/YR: CPT | Performed by: FAMILY MEDICINE

## 2021-05-04 PROCEDURE — G8427 DOCREV CUR MEDS BY ELIG CLIN: HCPCS | Performed by: FAMILY MEDICINE

## 2021-05-04 RX ORDER — METFORMIN HYDROCHLORIDE 500 MG/1
500 TABLET ORAL 2 TIMES DAILY WITH MEALS
Qty: 60 TAB | Refills: 1 | Status: SHIPPED | OUTPATIENT
Start: 2021-05-04 | End: 2021-05-05 | Stop reason: SDUPTHER

## 2021-05-04 RX ORDER — ISOPROPYL ALCOHOL 70 ML/100ML
SWAB TOPICAL
Qty: 100 PAD | Refills: 3 | Status: SHIPPED | OUTPATIENT
Start: 2021-05-04 | End: 2022-07-06 | Stop reason: SDUPTHER

## 2021-05-04 RX ORDER — CALCIUM CITRATE/VITAMIN D3 200MG-6.25
TABLET ORAL
Qty: 100 STRIP | Refills: 3 | Status: SHIPPED | OUTPATIENT
Start: 2021-05-04 | End: 2022-07-22

## 2021-05-04 RX ORDER — ATORVASTATIN CALCIUM 80 MG/1
80 TABLET, FILM COATED ORAL DAILY
Qty: 30 TAB | Refills: 2 | Status: SHIPPED | OUTPATIENT
Start: 2021-05-04 | End: 2021-05-05 | Stop reason: SDUPTHER

## 2021-05-04 RX ORDER — LANCETS
EACH MISCELLANEOUS
Qty: 100 EACH | Refills: 3 | Status: SHIPPED | OUTPATIENT
Start: 2021-05-04 | End: 2021-05-05 | Stop reason: SDUPTHER

## 2021-05-04 RX ORDER — INSULIN PUMP SYRINGE, 3 ML
EACH MISCELLANEOUS
Qty: 1 KIT | Refills: 0 | Status: SHIPPED | OUTPATIENT
Start: 2021-05-04

## 2021-05-04 NOTE — PATIENT INSTRUCTIONS
Type 2 Diabetes: Care Instructions Your Care Instructions Type 2 diabetes is a disease that develops when the body's tissues cannot use insulin properly. Over time, the pancreas cannot make enough insulin. Insulin is a hormone that helps the body's cells use sugar (glucose) for energy. It also helps the body store extra sugar in muscle, fat, and liver cells. Without insulin, the sugar cannot get into the cells to do its work. It stays in the blood instead. This can cause high blood sugar levels. A person has diabetes when the blood sugar stays too high too much of the time. Over time, diabetes can lead to diseases of the heart, blood vessels, nerves, kidneys, and eyes. You may be able to control your blood sugar by losing weight, eating a healthy diet, and getting daily exercise. You may also have to take insulin or other diabetes medicine. Follow-up care is a key part of your treatment and safety. Be sure to make and go to all appointments. Call your doctor if you are having problems. It's also a good idea to know your test results and keep a list of the medicines you take. How can you care for yourself at home? · Keep your blood sugar at a target level (which you set with your doctor). ? Carbohydratethe body's main source of fuelaffects blood sugar more than any other nutrient. Carbohydrate is in fruits, vegetables, milk, and yogurt. It also is in breads, cereals, vegetables such as potatoes and corn, and sugary foods such as candy and cakes. Follow your meal plan to know how much carbohydrate to eat at each meal and snack. ? Aim for 30 minutes of exercise on most, preferably all, days of the week. Walking is a good choice. You also may want to do other activities, such as running, swimming, cycling, or playing tennis or team sports. Try to do muscle-strengthening exercises at least 2 times a week. ? Take your medicines exactly as prescribed.  Call your doctor if you think you are having a problem with your medicine. You will get more details on the specific medicines your doctor prescribes. · Check your blood sugar as often as your doctor recommends. It is important to keep track of any symptoms you have, such as low blood sugar. Also tell your doctor if you have any changes in your activities, diet, or insulin use. · Talk to your doctor before you start taking aspirin every day. Aspirin can help certain people lower their risk of a heart attack or stroke. But taking aspirin isn't right for everyone, because it can cause serious bleeding. · Do not smoke. If you need help quitting, talk to your doctor about stop-smoking programs and medicines. These can increase your chances of quitting for good. · Keep your cholesterol and blood pressure at normal levels. You may need to take one or more medicines to reach your goals. Take them exactly as directed. Do not stop or change a medicine without talking to your doctor first. 
When should you call for help? Call 911 anytime you think you may need emergency care. For example, call if: 
  · You passed out (lost consciousness), or you suddenly become very sleepy or confused. (You may have very low blood sugar.) Call your doctor now or seek immediate medical care if: 
  · Your blood sugar is 300 mg/dL or is higher than the level your doctor has set for you.  
  · You have symptoms of low blood sugar, such as: ? Sweating. ? Feeling nervous, shaky, and weak. ? Extreme hunger and slight nausea. ? Dizziness and headache. 
? Blurred vision. ? Confusion. Watch closely for changes in your health, and be sure to contact your doctor if: 
  · You often have problems controlling your blood sugar.  
  · You have symptoms of long-term diabetes problems, such as: ? New vision changes. ? New pain, numbness, or tingling in your hands or feet. ? Skin problems. Where can you learn more? Go to http://www.gray.com/ Enter C553 in the search box to learn more about \"Type 2 Diabetes: Care Instructions. \" Current as of: August 31, 2020               Content Version: 12.8 © 2006-2021 [employer]. Care instructions adapted under license by [employer] (which disclaims liability or warranty for this information). If you have questions about a medical condition or this instruction, always ask your healthcare professional. [address] any warranty or liability for your use of this information. Learning About Metformin for Type 2 Diabetes Introduction Metformin is a medicine used to treat type 2 diabetes. It helps keep blood sugar levels on target. You may have tried to eat a healthy diet, lose weight, and get more exercise to keep your blood sugar in your target range. If those things do not help, you may take a medicine called metformin. It helps your body use insulin. This can help you control your blood sugar. You might take it on its own or with other medicines. When taken on its own, metformin should not cause low blood sugar or weight gain. Example · Metformin (Glucophage) Possible side effects Common side effects include: · Short-term nausea. · Not feeling hungry. · Diarrhea. · Increased gas in your belly. · A metallic taste. You may have side effects or reactions not listed here. Check the information that comes with your medicine. What to know about taking this medicine · Metformin does not usually cause low blood sugar. But you may get a low blood sugar when you take metformin and you exercise hard, drink alcohol, or you do not eat enough food. · Sometimes metformin is combined with other diabetes medicine. Some of these can cause low blood sugar. · If you need a test that uses a dye or you need to have surgery, be sure to tell all of your doctors that you take metformin. You may have to stop taking it before and after the test or surgery.  
· Over time, blood levels of vitamin B12 can decrease in some people who take metformin. Your body needs this B vitamin to make blood cells. It also keeps your nervous system healthy. If you have been taking metformin for more than a few years, ask your doctor if you need a B12 blood test to measure the amount of vitamin B12 in your blood. · Be safe with medicines. Take your medicines exactly as prescribed. Call your doctor if you think you are having a problem with your medicine. · Check with your doctor or pharmacist before you use any other medicines. This includes over-the-counter medicines. Make sure your doctor knows all of the medicines, vitamins, herbal products, and supplements you take. Taking some medicines together can cause problems. Where can you learn more? Go to http://www.bishop.com/ Enter Daily Eisenberg in the search box to learn more about \"Learning About Metformin for Type 2 Diabetes. \" Current as of: August 31, 2020               Content Version: 12.8 © 4709-2466 [employer]. Care instructions adapted under license by [employer] (which disclaims liability or warranty for this information). If you have questions about a medical condition or this instruction, always ask your healthcare professional. [place] [address] any warranty or liability for your use of this information. Home Blood Sugar Test: About This Test 
What is it? A home blood sugar test measures the amount of sugar (glucose) in your blood, using a small device called a blood sugar meter. It's a quick way to test your blood sugar anywhere, at any time. Why is this test done? Testing your blood sugar helps you know if your levels are in your target range. It helps you know when to take action and may help you avoid blood sugar emergencies. Testing also helps you learn how things like exercise, stress, and what you eat can affect your blood sugar.  
What happens before the test? 
The supplies you will need for testing blood sugar include: · A blood glucose meter. · Testing strips. These are made to be used with a specific model of meter. Make sure the strips haven't . · Sugar control solutions. Some meters require a specific solution. Many new meters are made to operate without a control solution. · Short needles called lancets for pricking your skin. · A pen-sized cavazos for the lancet (lancet device). It positions the lancet and controls how deeply it goes into your skin. · Clean cotton balls. These are used to stop the bleeding from the testing site. What happens during the test? 
Checking your blood sugar involves pricking your finger, palm, or forearm with a lancet to collect a drop of blood. The blood drop is placed on a test strip, which you insert into the blood glucose meter. The instructions for testing are slightly different for each blood glucose meter model. Follow the instructions that came with your meter. · Wash your hands with warm, soapy water. Dry them well with a clean towel. You may also use an alcohol wipe to clean your finger or other site. But make sure your hands are dry before the test. 
· Insert a clean lancet into the lancet device. · Remove a test strip from the test strip bottle. Replace the lid right away to keep moisture away from the other strips. · Follow the instructions that came with your meter to get it ready. · Use the lancet device to stick the side of your fingertip with the lancet. Do not stick the tip of your finger. Some blood sugar meters use lancet devices that take the blood sample from other sites, such as the palm of the hand or the forearm. But the finger is usually the most accurate place to test blood sugar. · Put a drop of blood on the correct spot on the test strip. · Apply pressure with a clean cotton ball to stop the bleeding. · Follow the directions that came with the meter to get the results.  
· Write down the results and the time that you tested your blood. Some meters will store the results for you. How long does the test take? The blood glucose meter will show the results of the test in a minute or less. What are the possible results for the test? 
The American Diabetes Association (ADA) recommends that you stay within the following blood glucose level ranges. But depending on your health, you and your doctor may set a different range for you. For nonpregnant adults with diabetes · 80 milligrams per deciliter (mg/dL) to 130 mg/dL before a meal 
· Less than 180 mg/dL 1 to 2 hours after a meal 
For women who have diabetes and are pregnant · 95 mg/dL or less before breakfast 
· 120 to 140 mg/dL (or lower) 1 to 2 hours after a meal 
Where can you learn more? Go to http://www.bishop.com/ Enter F871 in the search box to learn more about \"Home Blood Sugar Test: About This Test.\" Current as of: August 31, 2020               Content Version: 12.8 © 2006-2021 [employer]. Care instructions adapted under license by [employer] (which disclaims liability or warranty for this information). If you have questions about a medical condition or this instruction, always ask your healthcare professional. [place] [address] any warranty or liability for your use of this information. Learning About Meal Planning for Diabetes Why plan your meals? Meal planning can be a key part of managing diabetes. Planning meals and snacks with the right balance of carbohydrate, protein, and fat can help you keep your blood sugar at the target level you set with your doctor. You don't have to eat special foods. You can eat what your family eats, including sweets once in a while. But you do have to pay attention to how often you eat and how much you eat of certain foods. You may want to work with a dietitian or a certified diabetes educator.  He or she can give you tips and meal ideas and can answer your questions about meal planning. This health professional can also help you reach a healthy weight if that is one of your goals. What plan is right for you? Your dietitian or diabetes educator may suggest that you start with the plate format or carbohydrate counting. The plate format The plate format is a simple way to help you manage how you eat. You plan meals by learning how much space each food should take on a plate. Using the plate format helps you spread carbohydrate throughout the day. It can make it easier to keep your blood sugar level within your target range. It also helps you see if you're eating healthy portion sizes. To use the plate format, you put non-starchy vegetables on half your plate. Add meat or meat substitutes on one-quarter of the plate. Put a grain or starchy vegetable (such as brown rice or a potato) on the final quarter of the plate. You can add a small piece of fruit and some low-fat or fat-free milk or yogurt, depending on your carbohydrate goal for each meal. 
Here are some tips for using the plate format: · Make sure that you are not using an oversized plate. A 9-inch plate is best. Many restaurants use larger plates. · Get used to using the plate format at home. Then you can use it when you eat out. · Write down your questions about using the plate format. Talk to your doctor, a dietitian, or a diabetes educator about your concerns. Carbohydrate counting With carbohydrate counting, you plan meals based on the amount of carbohydrate in each food. Carbohydrate raises blood sugar higher and more quickly than any other nutrient. It is found in desserts, breads and cereals, and fruit. It's also found in starchy vegetables such as potatoes and corn, grains such as rice and pasta, and milk and yogurt. Spreading carbohydrate throughout the day helps keep your blood sugar levels within your target range.  
Your daily amount depends on several things, including your weight, how active you are, which diabetes medicines you take, and what your goals are for your blood sugar levels. A registered dietitian or diabetes educator can help you plan how much carbohydrate to include in each meal and snack. A guideline for your daily amount of carbohydrate is: · 45 to 60 grams at each meal. That's about the same as 3 to 4 carbohydrate servings. · 15 to 20 grams at each snack. That's about the same as 1 carbohydrate serving. The Nutrition Facts label on packaged foods tells you how much carbohydrate is in a serving of the food. First, look at the serving size on the food label. Is that the amount you eat in a serving? All of the nutrition information on a food label is based on that serving size. So if you eat more or less than that, you'll need to adjust the other numbers. Total carbohydrate is the next thing you need to look for on the label. If you count carbohydrate servings, one serving of carbohydrate is 15 grams. For foods that don't come with labels, such as fresh fruits and vegetables, you'll need a guide that lists carbohydrate in these foods. Ask your doctor, dietitian, or diabetes educator about books or other nutrition guides you can use. If you take insulin, you need to know how many grams of carbohydrate are in a meal. This lets you know how much rapid-acting insulin to take before you eat. If you use an insulin pump, you get a constant rate of insulin during the day. So the pump must be programmed at meals to give you extra insulin to cover the rise in blood sugar after meals. When you know how much carbohydrate you will eat, you can take the right amount of insulin. Or, if you always use the same amount of insulin, you need to make sure that you eat the same amount of carbohydrate at meals. If you need more help to understand carbohydrate counting and food labels, ask your doctor, dietitian, or diabetes educator. How can you plan healthy meals? Here are some tips to get started: 
· Plan your meals a week at a time. Don't forget to include snacks too. · Use cookbooks or online recipes to plan several main meals. Plan some quick meals for busy nights. You also can double some recipes that freeze well. Then you can save half for other busy nights when you don't have time to cook. · Make sure you have the ingredients you need for your recipes. If you're running low on basic items, put these items on your shopping list too. · List foods that you use to make breakfasts, lunches, and snacks. List plenty of fruits and vegetables. · Post this list on the refrigerator. Add to it as you think of more things you need. · Take the list to the store to do your weekly shopping. Follow-up care is a key part of your treatment and safety. Be sure to make and go to all appointments, and call your doctor if you are having problems. It's also a good idea to know your test results and keep a list of the medicines you take. Where can you learn more? Go to http://www.DocRun/ Mynor Valentine in the search box to learn more about \"Learning About Meal Planning for Diabetes. \" Current as of: August 31, 2020               Content Version: 12.8 © 1616-1948 [employer]. Care instructions adapted under license by [employer] (which disclaims liability or warranty for this information). If you have questions about a medical condition or this instruction, always ask your healthcare professional. [place] [address] any warranty or liability for your use of this information.

## 2021-05-04 NOTE — PROGRESS NOTES
Chief Complaint   Patient presents with    Hypertension       General Health Questions   -During the past 4 weeks:   -how would you rate your health in general? Fair   -how often have you been bothered by feeling dizzy when standing up? -how much have you been bothered by bodily pain? mildly   -Have you noticed any hearing difficulties? yes   -has your physical and emotional health limited your social activities with family or friends? no    Emotional Health Questions   -Do you have a history of depression, anxiety, or emotional problems? no  -Over the past 2 weeks, have you felt down, depressed or hopeless? no  -Over the past 2 weeks, have you felt little interest or pleasure in doing things? no    Health Habits   Please describe your diet habits: Good  Do you get 5 servings of fruits or vegetables daily? no  Do you exercise regularly? yes    Activities of Daily Living and Functional Status   -Do you need help with eating, walking, dressing, bathing, toileting, the phone, transportation, shopping, preparing meals, housework, laundry, medications or managing money? no  -In the past four weeks, was someone available to help you if you needed and wanted help with anything? yes  -Are you confident are you that you can control and manage most of your health problems? yes  -Have you been given information to help you keep track of your medications? no  -How often do you have trouble taking your medications as prescribed? never    Fall Risk and Home Safety   Have you fallen 2 or more times in the past year? no  Does your home have rugs in the hallways? Yes, Do you have grab bars in the bathrooms?  no, Does your home have handrails on the stairs? no and N/A, Do you have adequate lighting in your home? yes  Do you have smoke detectors and check them regularly?  yes  Do you have difficulties driving a car/vehicle? no  Do you always fasten your seat belt when you are in a car? yes

## 2021-05-05 DIAGNOSIS — E78.1 HYPERTRIGLYCERIDEMIA: ICD-10-CM

## 2021-05-05 DIAGNOSIS — E11.21 TYPE 2 DIABETES WITH NEPHROPATHY (HCC): ICD-10-CM

## 2021-05-05 RX ORDER — ATORVASTATIN CALCIUM 80 MG/1
80 TABLET, FILM COATED ORAL DAILY
Qty: 30 TAB | Refills: 2 | Status: SHIPPED | OUTPATIENT
Start: 2021-05-05 | End: 2021-08-30 | Stop reason: SDUPTHER

## 2021-05-05 RX ORDER — LANCETS
EACH MISCELLANEOUS
Qty: 100 EACH | Refills: 3 | Status: SHIPPED | OUTPATIENT
Start: 2021-05-05

## 2021-05-05 RX ORDER — METFORMIN HYDROCHLORIDE 500 MG/1
500 TABLET ORAL 2 TIMES DAILY WITH MEALS
Qty: 60 TAB | Refills: 1 | Status: SHIPPED | OUTPATIENT
Start: 2021-05-05 | End: 2021-08-30 | Stop reason: SDUPTHER

## 2021-05-05 RX ORDER — LOSARTAN POTASSIUM 100 MG/1
TABLET ORAL
Qty: 90 TAB | Refills: 0 | Status: SHIPPED | OUTPATIENT
Start: 2021-05-05 | End: 2021-07-27

## 2021-05-05 RX ORDER — AMLODIPINE BESYLATE 10 MG/1
TABLET ORAL
Qty: 90 TAB | Refills: 0 | Status: SHIPPED | OUTPATIENT
Start: 2021-05-05 | End: 2021-07-27

## 2021-05-19 ENCOUNTER — OFFICE VISIT (OUTPATIENT)
Dept: FAMILY MEDICINE CLINIC | Age: 71
End: 2021-05-19
Payer: MEDICARE

## 2021-05-19 VITALS
WEIGHT: 149 LBS | HEIGHT: 63 IN | SYSTOLIC BLOOD PRESSURE: 149 MMHG | HEART RATE: 94 BPM | OXYGEN SATURATION: 99 % | DIASTOLIC BLOOD PRESSURE: 78 MMHG | BODY MASS INDEX: 26.4 KG/M2 | TEMPERATURE: 97.7 F | RESPIRATION RATE: 16 BRPM

## 2021-05-19 DIAGNOSIS — E11.21 TYPE 2 DIABETES WITH NEPHROPATHY (HCC): Primary | ICD-10-CM

## 2021-05-19 PROCEDURE — G8754 DIAS BP LESS 90: HCPCS | Performed by: FAMILY MEDICINE

## 2021-05-19 PROCEDURE — 1101F PT FALLS ASSESS-DOCD LE1/YR: CPT | Performed by: FAMILY MEDICINE

## 2021-05-19 PROCEDURE — G8419 CALC BMI OUT NRM PARAM NOF/U: HCPCS | Performed by: FAMILY MEDICINE

## 2021-05-19 PROCEDURE — G8427 DOCREV CUR MEDS BY ELIG CLIN: HCPCS | Performed by: FAMILY MEDICINE

## 2021-05-19 PROCEDURE — G8753 SYS BP > OR = 140: HCPCS | Performed by: FAMILY MEDICINE

## 2021-05-19 PROCEDURE — 99214 OFFICE O/P EST MOD 30 MIN: CPT | Performed by: FAMILY MEDICINE

## 2021-05-19 PROCEDURE — G0463 HOSPITAL OUTPT CLINIC VISIT: HCPCS | Performed by: FAMILY MEDICINE

## 2021-05-19 PROCEDURE — 3017F COLORECTAL CA SCREEN DOC REV: CPT | Performed by: FAMILY MEDICINE

## 2021-05-19 PROCEDURE — 2022F DILAT RTA XM EVC RTNOPTHY: CPT | Performed by: FAMILY MEDICINE

## 2021-05-19 PROCEDURE — G8400 PT W/DXA NO RESULTS DOC: HCPCS | Performed by: FAMILY MEDICINE

## 2021-05-19 PROCEDURE — 1090F PRES/ABSN URINE INCON ASSESS: CPT | Performed by: FAMILY MEDICINE

## 2021-05-19 PROCEDURE — G8510 SCR DEP NEG, NO PLAN REQD: HCPCS | Performed by: FAMILY MEDICINE

## 2021-05-19 PROCEDURE — 3046F HEMOGLOBIN A1C LEVEL >9.0%: CPT | Performed by: FAMILY MEDICINE

## 2021-05-19 PROCEDURE — G8536 NO DOC ELDER MAL SCRN: HCPCS | Performed by: FAMILY MEDICINE

## 2021-05-19 RX ORDER — GLIPIZIDE 5 MG/1
2.5 TABLET ORAL DAILY
Qty: 45 TABLET | Refills: 1 | Status: SHIPPED | OUTPATIENT
Start: 2021-05-19 | End: 2021-06-14 | Stop reason: SDUPTHER

## 2021-05-19 NOTE — PROGRESS NOTES
Dc Amaro  79 y.o. female  1950  [address] [facility] [address]  [id number]   [address]: Progress Note  Lalo Cheatham MD       Encounter Date: 5/4/2021    Chief Complaint   Patient presents with    Hypertension     History of Present Illness   Dc Amaro is a 79 y.o. female who presents to clinic today for follow-up on hypertension. Patient last seen in 2019. Due to the Covid pandemic, patient had elected to defer appointments over the past year. She has been out of her medications for at least the past month. Denies chest pain, palpitations, lightheadedness, blurred vision. She does note occasional lightheadedness and dizziness with impaired gait. Reviewed lab work with patient (see below). Patient with \"new diagnosis of diabetes. \"  This is a surprise to the patient, however upon chart review this was discussed with her back in 2018. Patient does note polyuria, polydipsia, and polyphagia. Daughter does help her with meals, however she snacks often throughout the day. She has not been working at [employer] over the past year and has been more sedentary than before.     Lab Results   Component Value Date/Time    WBC 5.5 04/13/2021 10:35 AM    Hemoglobin (POC) 10.6 09/29/2015 04:05 PM    HGB 13.2 04/13/2021 10:35 AM    HCT 41.1 04/13/2021 10:35 AM    PLATELET 388 11/24/8483 10:35 AM    MCV 94.5 04/13/2021 10:35 AM     Lab Results   Component Value Date/Time    Sodium 134 (L) 04/13/2021 10:35 AM    Potassium 4.0 04/13/2021 10:35 AM    Chloride 100 04/13/2021 10:35 AM    CO2 26 04/13/2021 10:35 AM    Anion gap 8 04/13/2021 10:35 AM    Glucose 340 (H) 04/13/2021 10:35 AM    BUN 16 04/13/2021 10:35 AM    Creatinine 0.52 (L) 04/13/2021 10:35 AM    BUN/Creatinine ratio 31 (H) 04/13/2021 10:35 AM    GFR est AA >60 04/13/2021 10:35 AM    GFR est non-AA >60 04/13/2021 10:35 AM    Calcium 9.1 04/13/2021 10:35 AM     Lab Results   Component Value Date/Time Hemoglobin A1c 12.5 (H) 04/13/2021 10:35 AM     Lab Results   Component Value Date/Time    Cholesterol, total 308 (H) 04/13/2021 10:35 AM    HDL Cholesterol 30 04/13/2021 10:35 AM    LDL,Direct 90 04/13/2021 10:35 AM    LDL, calculated Not calculated due to elevated triglyceride level 04/13/2021 10:35 AM    VLDL, calculated  04/13/2021 10:35 AM     Calculation not valid with this patient's other Lipid values. Triglyceride 955 (H) 04/13/2021 10:35 AM    CHOL/HDL Ratio 10.3 (H) 04/13/2021 10:35 AM       Review of Systems   Review of Systems   Gastrointestinal: Negative. Genitourinary: Positive for frequency. Negative for dysuria and urgency. Musculoskeletal: Negative for falls. Neurological: Positive for dizziness and sensory change. Negative for headaches. Endo/Heme/Allergies: Positive for polydipsia. Vitals/Objective:     Vitals:    05/04/21 1039 05/04/21 1131   BP: (!) 169/91 (!) 149/81   Pulse: 85 81   Resp: 18    Temp: 97.5 °F (36.4 °C)    TempSrc: Temporal    SpO2: 96%    Weight: 148 lb 9.6 oz (67.4 kg)    Height: 5' 3\" (1.6 m)      Body mass index is 26.32 kg/m². General: Patient alert and oriented and in NAD, abnormal speech at baseline. HEENT: PER/EOMI, no conjunctival pallor or scleral icterus. No thyromegaly or cervical lymphadenopathy. Hearing loss bilaterally  Heart: Regular rate and rhythm, No murmurs, rubs or gallops. 2+ peripheral pulses  Lungs: Clear to auscultation bilaterally, no wheezing, rales or rhonchi  Abd: +BS, non-tender, non-distended  Ext: No edema  Skin: No rashes or lesions noted on exposed skin,  Psych: Appropriate mood and affect      Assessment and Plan:   1. Type 2 diabetes with nephropathy (HCC)  Start metformin. Given prescription for glucometer and referred to diabetic education. Will likely require additional medications, however I would like her to return with her daughter at her next visit to ensure patient understands diagnosis and plan.   - Blood-Glucose Meter (True Metrix Air Glucose Meter) monitoring kit; Use to check blood glucose daily  Dispense: 1 Kit; Refill: 0  - glucose blood VI test strips (True Metrix Glucose Test Strip) strip; Use to check blood sugar once daily  Dispense: 100 Strip; Refill: 3  - alcohol swabs (Alcohol Pads) padm; Use to clean fingers before checking blood sugar  Dispense: 100 Pad; Refill: 3  - REFERRAL TO DIABETIC EDUCATION; Standing    2. Hypertriglyceridemia  Significantly elevated total cholesterol and triglycerides. Given diagnosis of diabetes and these findings, will start atorvastatin. 3. Essential hypertension  Restart patient's antihypertensives. Follow-up blood pressure in 2 weeks when she returns    I have discussed the diagnosis with the patient and the intended plan as seen in the above orders. she has expressed understanding. The patient has received an after-visit summary and questions were answered concerning future plans. I have discussed medication side effects and warnings with the patient as well. Follow-up and Dispositions  ·   Return in about 2 weeks (around 5/18/2021) for Diabetes Follow-up. Electronically Signed: Yanna Velasquez MD     History   Patients past medical, surgical and family histories were reviewed and updated.     Past Medical History:   Diagnosis Date    Diverticulosis 9/23/2015    Hard of hearing     Hypertension     Iron deficiency anemia     baseline 9.7 on 8/2015    PUD (peptic ulcer disease)     hospitalized at [facility], EGD showed a nonbleeding esophageal ulcer, multiple gastric ulcers    Pulmonary nodule 8/2/2015    CT chest: 2.8 cm focal airspace disease RUL, pna vs mass, referred to pulm with PET    Type 2 diabetes mellitus without complication, without long-term current use of insulin ([facility] [address] 9/12/2015     Past Surgical History:   Procedure Laterality Date    HX MALIGNANT SKIN LESION EXCISION Right under  eye    HX PARTIAL HYSTERECTOMY Bilateral      Family History   Problem Relation Age of Onset    Diabetes Mother     Hypertension Mother     Alzheimer Mother     Pacemaker Mother     Heart Attack Father      Social History     Socioeconomic History    Marital status:      Spouse name: Not on file    Number of children: Not on file    Years of education: Not on file    Highest education level: Not on file   Occupational History    Not on file   Tobacco Use    Smoking status: Former Smoker     Packs/day: 0.25     Years: 30.00     Pack years: 7.50     Quit date: 10/16/2016     Years since quittin.5    Smokeless tobacco: Never Used   Substance and Sexual Activity    Alcohol use: Yes     Alcohol/week: 0.0 standard drinks     Comment: very rare    Drug use: No    Sexual activity: Not on file   Other Topics Concern    Not on file   Social History Narrative    Not on file     Social Determinants of Health     Financial Resource Strain:     Difficulty of Paying Living Expenses:    Food Insecurity:     Worried About Running Out of Food in the Last Year:     [address] in the Last Year:    Transportation Needs:     Lack of Transportation (Medical):      Lack of Transportation (Non-Medical):    Physical Activity:     Days of Exercise per Week:     Minutes of Exercise per Session:    Stress:     Feeling of Stress :    Social Connections:     Frequency of Communication with Friends and Family:     Frequency of Social Gatherings with Friends and Family:     Attends Restoration[employer] Services:     Active Member of Clubs or Organizations:     Attends Club or Organization Meetings:     Marital Status:    Intimate Partner Violence:     Fear of Current or Ex-Partner:     Emotionally Abused:     Physically Abused:     Sexually Abused:             Current Medications/Allergies     Current Outpatient Medications   Medication Sig Dispense Refill    Blood-Glucose Meter (True Metrix Air Glucose Meter) monitoring kit Use to check blood glucose daily 1 Kit 0    glucose blood VI test strips (True Metrix Glucose Test Strip) strip Use to check blood sugar once daily 100 Strip 3    alcohol swabs (Alcohol Pads) padm Use to clean fingers before checking blood sugar 100 Pad 3    albuterol (PROVENTIL HFA, VENTOLIN HFA, PROAIR HFA) 90 mcg/actuation inhaler Take 2 Puffs by inhalation every four (4) hours as needed for Wheezing. 1 Inhaler 5    naproxen (NAPROSYN) 500 mg tablet Take 1 Tab by mouth every twelve (12) hours as needed. Take with food 30 Tab 0    amLODIPine (NORVASC) 10 mg tablet Take 1 tablet by mouth once daily 90 Tab 0    losartan (COZAAR) 100 mg tablet Take 1 tablet by mouth once daily 90 Tab 0    atorvastatin (LIPITOR) 80 mg tablet Take 1 Tab by mouth daily. 30 Tab 2    lancets misc Use to check blood sugar once daily 100 Each 3    metFORMIN (GLUCOPHAGE) 500 mg tablet Take 1 Tab by mouth two (2) times daily (with meals). 60 Tab 1    acetaminophen (TYLENOL) 500 mg tablet Take  by mouth every six (6) hours as needed for Pain.        No Known Allergies

## 2021-05-19 NOTE — PROGRESS NOTES
Raquel Guallpa  79 y.o. female  1950  [address]  [address] [address]  [id number]   [address]: Progress Note  Tru Beaver MD       Encounter Date: 5/19/2021    Chief Complaint   Patient presents with    Diabetes     2 wk f/u for diabetes. Daughter checks patient's blood sugar. Lowest bs was 238. History of Present Illness   Raquel Guallpa is a 79 y.o. female who presents to clinic today for follow-up on blood sugar. She is here today with her daughter. At Ms. Maradiaga most recent appointment we reviewed her diagnosis of diabetes. She was unaware of her diagnosis despite previously being told about this. They were able to purchase a glucometer and have been checking her blood sugars at home. Blood sugars have ranged from 236-440. Review of Systems   Review of Systems   Eyes: Negative. Respiratory: Negative. Cardiovascular: Negative. Endo/Heme/Allergies: Positive for polydipsia. Vitals/Objective:     Vitals:    05/19/21 1302 05/19/21 1307   BP: (!) 157/72 (!) 149/78   Pulse: 70 94   Resp: 16    Temp: 97.7 °F (36.5 °C)    TempSrc: Oral    SpO2: 99%    Weight: 149 lb (67.6 kg)    Height: 5' 3\" (1.6 m)      Body mass index is 26.39 kg/m². General: Patient alert and oriented and in NAD  Heart: Regular rate and rhythm, No murmurs, rubs or gallops. 2+ peripheral pulses  Lungs: Clear to auscultation bilaterally, no wheezing, rales or rhonchi  Psych: Appropriate mood and affect      Assessment and Plan: Total time spent: 40 minutes    1. Type 2 diabetes with nephropathy (HCC)  Continue metformin and add glipizide. Will gradually titrate up. Encouraged to meet with diabetes educator. Continue to monitor blood glucose. Follow-up in 4 weeks. - glipiZIDE (GLUCOTROL) 5 mg tablet; Take 0.5 Tablets by mouth daily. Dispense: 45 Tablet;  Refill: 1      I have discussed the diagnosis with the patient and the intended plan as seen in the above orders. she has expressed understanding. The patient has received an after-visit summary and questions were answered concerning future plans. I have discussed medication side effects and warnings with the patient as well. Follow-up and Dispositions  ·   Return in about 4 weeks (around 2021). Electronically Signed: Rakesh Meek MD     History   Patients past medical, surgical and family histories were reviewed and updated. Past Medical History:   Diagnosis Date    Diverticulosis 2015    Hard of hearing     Hypertension     Iron deficiency anemia     baseline 9.7 on 2015    PUD (peptic ulcer disease)     hospitalized at [facility], EGD showed a nonbleeding esophageal ulcer, multiple gastric ulcers    Pulmonary nodule 2015    CT chest: 2.8 cm focal airspace disease RUL, pna vs mass, referred to pulm with PET    Type 2 diabetes mellitus without complication, without long-term current use of insulin ([facility] [address] 2015     Past Surgical History:   Procedure Laterality Date    HX MALIGNANT SKIN LESION EXCISION Right under  eye    HX PARTIAL HYSTERECTOMY Bilateral      Family History   Problem Relation Age of Onset    Diabetes Mother     Hypertension Mother     Alzheimer Mother     Pacemaker Mother     Heart Attack Father      Social History     Socioeconomic History    Marital status:      Spouse name: Not on file    Number of children: Not on file    Years of education: Not on file    Highest education level: Not on file   Occupational History    Not on file   Tobacco Use    Smoking status: Former Smoker     Packs/day: 0.25     Years: 30.00     Pack years: 7.50     Quit date: 10/16/2016     Years since quittin.5    Smokeless tobacco: Never Used   Vaping Use    Vaping Use: Never used   Substance and Sexual Activity    Alcohol use:  Yes     Alcohol/week: 0.0 standard drinks     Comment: very rare    Drug use: No    Sexual activity: Not on file Other Topics Concern    Not on file   Social History Narrative    Not on file     Social Determinants of Health     Financial Resource Strain:     Difficulty of Paying Living Expenses:    Food Insecurity:     Worried About Running Out of Food in the Last Year:     [address] in the Last Year:    Transportation Needs:     Lack of Transportation (Medical):  Lack of Transportation (Non-Medical):    Physical Activity:     Days of Exercise per Week:     Minutes of Exercise per Session:    Stress:     Feeling of Stress :    Social Connections:     Frequency of Communication with Friends and Family:     Frequency of Social Gatherings with Friends and Family:     Attends Denominational Services:     Active Member of Clubs or Organizations:     Attends Club or Organization Meetings:     Marital Status:    Intimate Partner Violence:     Fear of Current or Ex-Partner:     Emotionally Abused:     Physically Abused:     Sexually Abused:             Current Medications/Allergies     Current Outpatient Medications   Medication Sig Dispense Refill    amLODIPine (NORVASC) 10 mg tablet Take 1 tablet by mouth once daily 90 Tab 0    losartan (COZAAR) 100 mg tablet Take 1 tablet by mouth once daily 90 Tab 0    atorvastatin (LIPITOR) 80 mg tablet Take 1 Tab by mouth daily. 30 Tab 2    lancets misc Use to check blood sugar once daily 100 Each 3    metFORMIN (GLUCOPHAGE) 500 mg tablet Take 1 Tab by mouth two (2) times daily (with meals).  60 Tab 1    Blood-Glucose Meter (True Metrix Air Glucose Meter) monitoring kit Use to check blood glucose daily 1 Kit 0    glucose blood VI test strips (True Metrix Glucose Test Strip) strip Use to check blood sugar once daily 100 Strip 3    alcohol swabs (Alcohol Pads) padm Use to clean fingers before checking blood sugar 100 Pad 3    albuterol (PROVENTIL HFA, VENTOLIN HFA, PROAIR HFA) 90 mcg/actuation inhaler Take 2 Puffs by inhalation every four (4) hours as needed for Wheezing. 1 Inhaler 5    acetaminophen (TYLENOL) 500 mg tablet Take  by mouth every six (6) hours as needed for Pain.  naproxen (NAPROSYN) 500 mg tablet Take 1 Tab by mouth every twelve (12) hours as needed.  Take with food 30 Tab 0     No Known Allergies

## 2021-05-19 NOTE — PROGRESS NOTES
Chief Complaint   Patient presents with    Diabetes     2 wk f/u for diabetes. Daughter checks patient's blood sugar. Lowest bs was 238.     1. Have you been to the ER, urgent care clinic since your last visit? Hospitalized since your last visit?no    2. Have you seen or consulted any other health care providers outside of the [address] [place] [address] since your last visit? Include any pap smears or colon screening.  no

## 2021-05-19 NOTE — PATIENT INSTRUCTIONS
Home Blood Sugar Test: About This Test 
What is it? A home blood sugar test measures the amount of sugar (glucose) in your blood, using a small device called a blood sugar meter. It's a quick way to test your blood sugar anywhere, at any time. Why is this test done? Testing your blood sugar helps you know if your levels are in your target range. It helps you know when to take action and may help you avoid blood sugar emergencies. Testing also helps you learn how things like exercise, stress, and what you eat can affect your blood sugar. What happens before the test? 
The supplies you will need for testing blood sugar include: · A blood glucose meter. · Testing strips. These are made to be used with a specific model of meter. Make sure the strips haven't . · Sugar control solutions. Some meters require a specific solution. Many new meters are made to operate without a control solution. · Short needles called lancets for pricking your skin. · A pen-sized cavazos for the lancet (lancet device). It positions the lancet and controls how deeply it goes into your skin. · Clean cotton balls. These are used to stop the bleeding from the testing site. What happens during the test? 
Checking your blood sugar involves pricking your finger, palm, or forearm with a lancet to collect a drop of blood. The blood drop is placed on a test strip, which you insert into the blood glucose meter. The instructions for testing are slightly different for each blood glucose meter model. Follow the instructions that came with your meter. · Wash your hands with warm, soapy water. Dry them well with a clean towel. You may also use an alcohol wipe to clean your finger or other site. But make sure your hands are dry before the test. 
· Insert a clean lancet into the lancet device. · Remove a test strip from the test strip bottle. Replace the lid right away to keep moisture away from the other strips.  
· Follow the instructions that came with your meter to get it ready. · Use the lancet device to stick the side of your fingertip with the lancet. Do not stick the tip of your finger. Some blood sugar meters use lancet devices that take the blood sample from other sites, such as the palm of the hand or the forearm. But the finger is usually the most accurate place to test blood sugar. · Put a drop of blood on the correct spot on the test strip. · Apply pressure with a clean cotton ball to stop the bleeding. · Follow the directions that came with the meter to get the results. · Write down the results and the time that you tested your blood. Some meters will store the results for you. How long does the test take? The blood glucose meter will show the results of the test in a minute or less. What are the possible results for the test? 
The American Diabetes Association (ADA) recommends that you stay within the following blood glucose level ranges. But depending on your health, you and your doctor may set a different range for you. For nonpregnant adults with diabetes · 80 milligrams per deciliter (mg/dL) to 130 mg/dL before a meal 
· Less than 180 mg/dL 1 to 2 hours after a meal 
For women who have diabetes and are pregnant · 95 mg/dL or less before breakfast 
· 120 to 140 mg/dL (or lower) 1 to 2 hours after a meal 
Where can you learn more? Go to http://www.gray.com/ Enter U097 in the search box to learn more about \"Home Blood Sugar Test: About This Test.\" Current as of: August 31, 2020               Content Version: 12.8 © 2006-2021 [employer]. Care instructions adapted under license by [employer] (which disclaims liability or warranty for this information). If you have questions about a medical condition or this instruction, always ask your healthcare professional. [address] any warranty or liability for your use of this information. Learning About Meal Planning for Diabetes Why plan your meals? Meal planning can be a key part of managing diabetes. Planning meals and snacks with the right balance of carbohydrate, protein, and fat can help you keep your blood sugar at the target level you set with your doctor. You don't have to eat special foods. You can eat what your family eats, including sweets once in a while. But you do have to pay attention to how often you eat and how much you eat of certain foods. You may want to work with a dietitian or a certified diabetes educator. He or she can give you tips and meal ideas and can answer your questions about meal planning. This health professional can also help you reach a healthy weight if that is one of your goals. What plan is right for you? Your dietitian or diabetes educator may suggest that you start with the plate format or carbohydrate counting. The plate format The plate format is a simple way to help you manage how you eat. You plan meals by learning how much space each food should take on a plate. Using the plate format helps you spread carbohydrate throughout the day. It can make it easier to keep your blood sugar level within your target range. It also helps you see if you're eating healthy portion sizes. To use the plate format, you put non-starchy vegetables on half your plate. Add meat or meat substitutes on one-quarter of the plate. Put a grain or starchy vegetable (such as brown rice or a potato) on the final quarter of the plate. You can add a small piece of fruit and some low-fat or fat-free milk or yogurt, depending on your carbohydrate goal for each meal. 
Here are some tips for using the plate format: · Make sure that you are not using an oversized plate. A 9-inch plate is best. Many restaurants use larger plates. · Get used to using the plate format at home. Then you can use it when you eat out. · Write down your questions about using the plate format.  Talk to your doctor, a dietitian, or a diabetes educator about your concerns. Carbohydrate counting With carbohydrate counting, you plan meals based on the amount of carbohydrate in each food. Carbohydrate raises blood sugar higher and more quickly than any other nutrient. It is found in desserts, breads and cereals, and fruit. It's also found in starchy vegetables such as potatoes and corn, grains such as rice and pasta, and milk and yogurt. Spreading carbohydrate throughout the day helps keep your blood sugar levels within your target range. Your daily amount depends on several things, including your weight, how active you are, which diabetes medicines you take, and what your goals are for your blood sugar levels. A registered dietitian or diabetes educator can help you plan how much carbohydrate to include in each meal and snack. A guideline for your daily amount of carbohydrate is: · 45 to 60 grams at each meal. That's about the same as 3 to 4 carbohydrate servings. · 15 to 20 grams at each snack. That's about the same as 1 carbohydrate serving. The Nutrition Facts label on packaged foods tells you how much carbohydrate is in a serving of the food. First, look at the serving size on the food label. Is that the amount you eat in a serving? All of the nutrition information on a food label is based on that serving size. So if you eat more or less than that, you'll need to adjust the other numbers. Total carbohydrate is the next thing you need to look for on the label. If you count carbohydrate servings, one serving of carbohydrate is 15 grams. For foods that don't come with labels, such as fresh fruits and vegetables, you'll need a guide that lists carbohydrate in these foods. Ask your doctor, dietitian, or diabetes educator about books or other nutrition guides you can use.  
If you take insulin, you need to know how many grams of carbohydrate are in a meal. This lets you know how much rapid-acting insulin to take before you eat. If you use an insulin pump, you get a constant rate of insulin during the day. So the pump must be programmed at meals to give you extra insulin to cover the rise in blood sugar after meals. When you know how much carbohydrate you will eat, you can take the right amount of insulin. Or, if you always use the same amount of insulin, you need to make sure that you eat the same amount of carbohydrate at meals. If you need more help to understand carbohydrate counting and food labels, ask your doctor, dietitian, or diabetes educator. How can you plan healthy meals? Here are some tips to get started: 
· Plan your meals a week at a time. Don't forget to include snacks too. · Use cookbooks or online recipes to plan several main meals. Plan some quick meals for busy nights. You also can double some recipes that freeze well. Then you can save half for other busy nights when you don't have time to cook. · Make sure you have the ingredients you need for your recipes. If you're running low on basic items, put these items on your shopping list too. · List foods that you use to make breakfasts, lunches, and snacks. List plenty of fruits and vegetables. · Post this list on the refrigerator. Add to it as you think of more things you need. · Take the list to the store to do your weekly shopping. Follow-up care is a key part of your treatment and safety. Be sure to make and go to all appointments, and call your doctor if you are having problems. It's also a good idea to know your test results and keep a list of the medicines you take. Where can you learn more? Go to http://www.gray.com/ Mynor Valentine in the search box to learn more about \"Learning About Meal Planning for Diabetes. \" Current as of: August 31, 2020               Content Version: 12.8 © 4875-0716 [employer].   
Care instructions adapted under license by [employer] (which disclaims liability or warranty for this information). If you have questions about a medical condition or this instruction, always ask your healthcare professional. [place] [address] any warranty or liability for your use of this information.

## 2021-06-14 ENCOUNTER — OFFICE VISIT (OUTPATIENT)
Dept: FAMILY MEDICINE CLINIC | Age: 71
End: 2021-06-14
Payer: MEDICARE

## 2021-06-14 VITALS
HEART RATE: 70 BPM | SYSTOLIC BLOOD PRESSURE: 121 MMHG | WEIGHT: 150 LBS | TEMPERATURE: 98 F | HEIGHT: 63 IN | OXYGEN SATURATION: 97 % | DIASTOLIC BLOOD PRESSURE: 69 MMHG | RESPIRATION RATE: 16 BRPM | BODY MASS INDEX: 26.58 KG/M2

## 2021-06-14 DIAGNOSIS — E11.21 TYPE 2 DIABETES WITH NEPHROPATHY (HCC): Primary | ICD-10-CM

## 2021-06-14 PROCEDURE — G8400 PT W/DXA NO RESULTS DOC: HCPCS | Performed by: FAMILY MEDICINE

## 2021-06-14 PROCEDURE — G8427 DOCREV CUR MEDS BY ELIG CLIN: HCPCS | Performed by: FAMILY MEDICINE

## 2021-06-14 PROCEDURE — 1101F PT FALLS ASSESS-DOCD LE1/YR: CPT | Performed by: FAMILY MEDICINE

## 2021-06-14 PROCEDURE — G8432 DEP SCR NOT DOC, RNG: HCPCS | Performed by: FAMILY MEDICINE

## 2021-06-14 PROCEDURE — 1090F PRES/ABSN URINE INCON ASSESS: CPT | Performed by: FAMILY MEDICINE

## 2021-06-14 PROCEDURE — 2022F DILAT RTA XM EVC RTNOPTHY: CPT | Performed by: FAMILY MEDICINE

## 2021-06-14 PROCEDURE — G0463 HOSPITAL OUTPT CLINIC VISIT: HCPCS | Performed by: FAMILY MEDICINE

## 2021-06-14 PROCEDURE — G8419 CALC BMI OUT NRM PARAM NOF/U: HCPCS | Performed by: FAMILY MEDICINE

## 2021-06-14 PROCEDURE — G8752 SYS BP LESS 140: HCPCS | Performed by: FAMILY MEDICINE

## 2021-06-14 PROCEDURE — G8754 DIAS BP LESS 90: HCPCS | Performed by: FAMILY MEDICINE

## 2021-06-14 PROCEDURE — 99213 OFFICE O/P EST LOW 20 MIN: CPT | Performed by: FAMILY MEDICINE

## 2021-06-14 PROCEDURE — 3046F HEMOGLOBIN A1C LEVEL >9.0%: CPT | Performed by: FAMILY MEDICINE

## 2021-06-14 PROCEDURE — G8536 NO DOC ELDER MAL SCRN: HCPCS | Performed by: FAMILY MEDICINE

## 2021-06-14 PROCEDURE — 3017F COLORECTAL CA SCREEN DOC REV: CPT | Performed by: FAMILY MEDICINE

## 2021-06-14 RX ORDER — GLIPIZIDE 5 MG/1
5 TABLET ORAL DAILY
Qty: 90 TABLET | Refills: 1 | Status: SHIPPED | OUTPATIENT
Start: 2021-06-14 | End: 2022-01-06

## 2021-06-14 NOTE — PATIENT INSTRUCTIONS

## 2021-06-14 NOTE — PROGRESS NOTES
Dacia Rodríguez  79 y.o. female  1950  [address]  [address] [facility] [id number]  [id number]   [address]: Progress Note  Diana Vides MD       Encounter Date: 6/14/2021    Chief Complaint   Patient presents with    Follow Up Chronic Condition     DM follow up. States doing well. She has been doing her on BS and brought her log with her. History of Present Illness   Dacia Rodríguez is a 79 y.o. female who presents to clinic today for Diabetes folloow-up. [phone] over the past week. She is checking her glucose daily and recording the values on her own. She is taking the metformin and glipizide as prescribed and denies side effects. Review of Systems   Review of Systems   Gastrointestinal: Negative for abdominal pain and diarrhea. Genitourinary: Negative for frequency. Endo/Heme/Allergies: Negative for polydipsia. Vitals/Objective:     Vitals:    06/14/21 1444 06/14/21 1456   BP: (!) 155/77 121/69   Pulse: 78 70   Resp: 16    Temp: 98 °F (36.7 °C)    TempSrc: Temporal    SpO2: 97%    Weight: 150 lb (68 kg)    Height: 5' 3\" (1.6 m)      Body mass index is 26.57 kg/m². Physical Exam  Constitutional:       General: She is not in acute distress. Appearance: Normal appearance. Cardiovascular:      Rate and Rhythm: Normal rate and regular rhythm. Pulmonary:      Effort: Pulmonary effort is normal.      Breath sounds: Normal breath sounds. Neurological:      Cranial Nerves: Dysarthria (At baseline) present. Assessment and Plan:   1. Type 2 diabetes with nephropathy (HCC)  Increase glipizide to 5mg daily. Continue to keep daily glucose log.  - glipiZIDE (GLUCOTROL) 5 mg tablet; Take 1 Tablet by mouth daily. Dispense: 90 Tablet; Refill: 1      I have discussed the diagnosis with the patient and the intended plan as seen in the above orders. she has expressed understanding.   The patient has received an after-visit summary and questions were answered concerning future plans. I have discussed medication side effects and warnings with the patient as well. Follow-up and Dispositions  ·   Return in about 6 weeks (around 2021). Electronically Signed: Joey Mcbride MD     History   Patients past medical, surgical and family histories were reviewed and updated. Past Medical History:   Diagnosis Date    Diverticulosis 2015    Hard of hearing     Hypertension     Iron deficiency anemia     baseline 9.7 on 2015    PUD (peptic ulcer disease)     hospitalized at [facility], EGD showed a nonbleeding esophageal ulcer, multiple gastric ulcers    Pulmonary nodule 2015    CT chest: 2.8 cm focal airspace disease RUL, pna vs mass, referred to pulm with PET    Type 2 diabetes mellitus without complication, without long-term current use of insulin ([facility] [address] 2015     Past Surgical History:   Procedure Laterality Date    HX MALIGNANT SKIN LESION EXCISION Right under  eye    HX PARTIAL HYSTERECTOMY Bilateral      Family History   Problem Relation Age of Onset    Diabetes Mother     Hypertension Mother     Alzheimer Mother     Pacemaker Mother     Heart Attack Father      Social History     Socioeconomic History    Marital status:      Spouse name: Not on file    Number of children: Not on file    Years of education: Not on file    Highest education level: Not on file   Occupational History    Not on file   Tobacco Use    Smoking status: Former Smoker     Packs/day: 0.25     Years: 30.00     Pack years: 7.50     Quit date: 10/16/2016     Years since quittin.6    Smokeless tobacco: Never Used   Vaping Use    Vaping Use: Never used   Substance and Sexual Activity    Alcohol use:  Yes     Alcohol/week: 0.0 standard drinks     Comment: very rare    Drug use: No    Sexual activity: Not on file   Other Topics Concern    Not on file   Social History Narrative    Not on file     Social Determinants of Health     Financial Resource Strain:     Difficulty of Paying Living Expenses:    Food Insecurity:     Worried About Running Out of Food in the Last Year:     [address] in the Last Year:    Transportation Needs:     Lack of Transportation (Medical):  Lack of Transportation (Non-Medical):    Physical Activity:     Days of Exercise per Week:     Minutes of Exercise per Session:    Stress:     Feeling of Stress :    Social Connections:     Frequency of Communication with Friends and Family:     Frequency of Social Gatherings with Friends and Family:     Attends Orthodoxy Services:     Active Member of Clubs or Organizations:     Attends Club or Organization Meetings:     Marital Status:    Intimate Partner Violence:     Fear of Current or Ex-Partner:     Emotionally Abused:     Physically Abused:     Sexually Abused:             Current Medications/Allergies     Current Outpatient Medications   Medication Sig Dispense Refill    glipiZIDE (GLUCOTROL) 5 mg tablet Take 0.5 Tablets by mouth daily. 45 Tablet 1    amLODIPine (NORVASC) 10 mg tablet Take 1 tablet by mouth once daily 90 Tab 0    losartan (COZAAR) 100 mg tablet Take 1 tablet by mouth once daily 90 Tab 0    atorvastatin (LIPITOR) 80 mg tablet Take 1 Tab by mouth daily. 30 Tab 2    lancets misc Use to check blood sugar once daily 100 Each 3    metFORMIN (GLUCOPHAGE) 500 mg tablet Take 1 Tab by mouth two (2) times daily (with meals). 60 Tab 1    Blood-Glucose Meter (True Metrix Air Glucose Meter) monitoring kit Use to check blood glucose daily 1 Kit 0    glucose blood VI test strips (True Metrix Glucose Test Strip) strip Use to check blood sugar once daily 100 Strip 3    alcohol swabs (Alcohol Pads) padm Use to clean fingers before checking blood sugar 100 Pad 3    albuterol (PROVENTIL HFA, VENTOLIN HFA, PROAIR HFA) 90 mcg/actuation inhaler Take 2 Puffs by inhalation every four (4) hours as needed for Wheezing.  1 Inhaler 5    acetaminophen (TYLENOL) 500 mg tablet Take  by mouth every six (6) hours as needed for Pain.  naproxen (NAPROSYN) 500 mg tablet Take 1 Tab by mouth every twelve (12) hours as needed.  Take with food (Patient not taking: Reported on 6/14/2021) 30 Tab 0     No Known Allergies

## 2021-06-14 NOTE — PROGRESS NOTES
Chief Complaint   Patient presents with    Follow Up Chronic Condition     DM follow up. States doing well. She has been doing her on BS and brought her log with her. 1. Have you been to the ER, urgent care clinic since your last visit? Hospitalized since your last visit? no  2. Have you seen or consulted any other health care providers outside of the [address] [place] [address] since your last visit? Include any pap smears or colon screening.   no

## 2021-06-14 NOTE — LETTER
6/17/2021 1:12 PM 
 
[address] [facility] [address] Dear Ms. Gutierrez: 
 
I just wanted to follow-up from our last visit to summarize what we discussed. · Please increase your glipizide to 5mg daily (1 full tablet) · Continue to check blood sugars daily · Follow-up with me in 6 weeks. Bring your blood sugar log. Please let me know if you have any questions.  
 
Sincerely, 
 
 
Jonna Torres MD

## 2021-06-23 ENCOUNTER — TELEPHONE (OUTPATIENT)
Dept: FAMILY MEDICINE CLINIC | Age: 71
End: 2021-06-23

## 2021-06-23 NOTE — TELEPHONE ENCOUNTER
Received notice that patient did not show for her diabetes education appointment. They are requesting that if she is still interested have her call [phone] to reschedule. Will reach out to patient/daughter to follow-up. Will also ensure she has increased her glipizide to 5mg (1 tab) and continued her metformin.

## 2021-06-24 NOTE — TELEPHONE ENCOUNTER
Called patient's daughter--Lizet (on [employer]) and left a message for her to call the office back. See Dr. Johnna Ponce message below-->when she calls back.

## 2021-07-15 NOTE — TELEPHONE ENCOUNTER
Called, no answer. [address] for appt  ===View-only below this line===  ----- Message -----  From: Carmel Molina MD  Sent: 2/18/2021  10:29 AM EST  To: [facility] Front Office    Patient called to cancel todays appointment. Will need to reschedule. I prefer an in-person appointment. She does not have reliable wifi. Also needs labs. Please prescreen and schedule. Thank you! Statement Selected

## 2021-08-30 ENCOUNTER — OFFICE VISIT (OUTPATIENT)
Dept: FAMILY MEDICINE CLINIC | Age: 71
End: 2021-08-30
Payer: MEDICARE

## 2021-08-30 VITALS
HEART RATE: 91 BPM | DIASTOLIC BLOOD PRESSURE: 72 MMHG | WEIGHT: 156.2 LBS | HEIGHT: 63 IN | TEMPERATURE: 97.7 F | OXYGEN SATURATION: 97 % | SYSTOLIC BLOOD PRESSURE: 157 MMHG | BODY MASS INDEX: 27.68 KG/M2 | RESPIRATION RATE: 16 BRPM

## 2021-08-30 DIAGNOSIS — M25.50 ARTHRALGIA, UNSPECIFIED JOINT: ICD-10-CM

## 2021-08-30 DIAGNOSIS — D50.9 IRON DEFICIENCY ANEMIA, UNSPECIFIED IRON DEFICIENCY ANEMIA TYPE: ICD-10-CM

## 2021-08-30 DIAGNOSIS — Z78.0 POSTMENOPAUSAL STATUS, AGE-RELATED: ICD-10-CM

## 2021-08-30 DIAGNOSIS — E11.21 TYPE 2 DIABETES WITH NEPHROPATHY (HCC): ICD-10-CM

## 2021-08-30 DIAGNOSIS — G25.81 RESTLESS LEG: ICD-10-CM

## 2021-08-30 DIAGNOSIS — Z12.31 ENCOUNTER FOR SCREENING MAMMOGRAM FOR MALIGNANT NEOPLASM OF BREAST: Primary | ICD-10-CM

## 2021-08-30 DIAGNOSIS — E78.1 HYPERTRIGLYCERIDEMIA: ICD-10-CM

## 2021-08-30 PROCEDURE — G8427 DOCREV CUR MEDS BY ELIG CLIN: HCPCS | Performed by: FAMILY MEDICINE

## 2021-08-30 PROCEDURE — 99214 OFFICE O/P EST MOD 30 MIN: CPT | Performed by: FAMILY MEDICINE

## 2021-08-30 PROCEDURE — G8400 PT W/DXA NO RESULTS DOC: HCPCS | Performed by: FAMILY MEDICINE

## 2021-08-30 PROCEDURE — 3017F COLORECTAL CA SCREEN DOC REV: CPT | Performed by: FAMILY MEDICINE

## 2021-08-30 PROCEDURE — G0463 HOSPITAL OUTPT CLINIC VISIT: HCPCS | Performed by: FAMILY MEDICINE

## 2021-08-30 PROCEDURE — 2022F DILAT RTA XM EVC RTNOPTHY: CPT | Performed by: FAMILY MEDICINE

## 2021-08-30 PROCEDURE — 1101F PT FALLS ASSESS-DOCD LE1/YR: CPT | Performed by: FAMILY MEDICINE

## 2021-08-30 PROCEDURE — G8754 DIAS BP LESS 90: HCPCS | Performed by: FAMILY MEDICINE

## 2021-08-30 PROCEDURE — G8536 NO DOC ELDER MAL SCRN: HCPCS | Performed by: FAMILY MEDICINE

## 2021-08-30 PROCEDURE — G9899 SCRN MAM PERF RSLTS DOC: HCPCS | Performed by: FAMILY MEDICINE

## 2021-08-30 PROCEDURE — G8753 SYS BP > OR = 140: HCPCS | Performed by: FAMILY MEDICINE

## 2021-08-30 PROCEDURE — G8419 CALC BMI OUT NRM PARAM NOF/U: HCPCS | Performed by: FAMILY MEDICINE

## 2021-08-30 PROCEDURE — 1090F PRES/ABSN URINE INCON ASSESS: CPT | Performed by: FAMILY MEDICINE

## 2021-08-30 PROCEDURE — G8432 DEP SCR NOT DOC, RNG: HCPCS | Performed by: FAMILY MEDICINE

## 2021-08-30 PROCEDURE — 3052F HG A1C>EQUAL 8.0%<EQUAL 9.0%: CPT | Performed by: FAMILY MEDICINE

## 2021-08-30 RX ORDER — MULTIVIT WITH IRON,MINERALS
TABLET,CHEWABLE ORAL
COMMUNITY
Start: 2021-08-30

## 2021-08-30 RX ORDER — METFORMIN HYDROCHLORIDE 500 MG/1
1000 TABLET ORAL 2 TIMES DAILY WITH MEALS
Qty: 60 TABLET | Refills: 1 | Status: SHIPPED | OUTPATIENT
Start: 2021-08-30 | End: 2021-11-03

## 2021-08-30 RX ORDER — ATORVASTATIN CALCIUM 80 MG/1
80 TABLET, FILM COATED ORAL DAILY
Qty: 30 TABLET | Refills: 2 | Status: SHIPPED | OUTPATIENT
Start: 2021-08-30 | End: 2022-02-02

## 2021-08-30 NOTE — PROGRESS NOTES
Chief Complaint   Patient presents with    Diabetes     Patient states she is here for a diabetes follow up. She would also like to discuss body pain. States taking too much over the counter pain medication.

## 2021-08-30 NOTE — PATIENT INSTRUCTIONS
Joint Pain: Care Instructions  Your Care Instructions     Many people have small aches and pains from overuse or injury to muscles and joints. Joint injuries often happen during sports or recreation, work tasks, or projects around the home. An overuse injury can happen when you put too much stress on a joint or when you do an activity that stresses the joint over and over, such as using the computer or rowing a boat. You can take action at home to help your muscles and joints get better. You should feel better in 1 to 2 weeks, but it can take 3 months or more to heal completely. Follow-up care is a key part of your treatment and safety. Be sure to make and go to all appointments, and call your doctor if you are having problems. It's also a good idea to know your test results and keep a list of the medicines you take. How can you care for yourself at home? · Do not put weight on the injured joint for at least a day or two. · For the first day or two after an injury, do not take hot showers or baths, and do not use hot packs. The heat could make swelling worse. · Put ice or a cold pack on the sore joint for 10 to 20 minutes at a time. Try to do this every 1 to 2 hours for the next 3 days (when you are awake) or until the swelling goes down. Put a thin cloth between the ice and your skin. · Wrap the injury in an elastic bandage. Do not wrap it too tightly because this can cause more swelling. · Prop up the sore joint on a pillow when you ice it or anytime you sit or lie down during the next 3 days. Try to keep it above the level of your heart. This will help reduce swelling. · Take an over-the-counter pain medicine, such as acetaminophen (Tylenol), ibuprofen (Advil, Motrin), or naproxen (Aleve). Read and follow all instructions on the label. · After 1 or 2 days of rest, begin moving the joint gently.  While the joint is still healing, you can begin to exercise using activities that do not strain or hurt the painful joint. When should you call for help? Call your doctor now or seek immediate medical care if:    · You have signs of infection, such as:  ? Increased pain, swelling, warmth, and redness. ? Red streaks leading from the joint. ? A fever. Watch closely for changes in your health, and be sure to contact your doctor if:    · Your movement or symptoms are not getting better after 1 to 2 weeks of home treatment. Where can you learn more? Go to http://www.gray.com/  Enter P205 in the search box to learn more about \"Joint Pain: Care Instructions. \"  Current as of: November 16, 2020               Content Version: 12.8  © 2006-2021 [employer]. Care instructions adapted under license by [employer] (which disclaims liability or warranty for this information). If you have questions about a medical condition or this instruction, always ask your healthcare professional. [place] [address] any warranty or liability for your use of this information. High Cholesterol: Care Instructions  Your Care Instructions     Cholesterol is a type of fat in your blood. It is needed for many body functions, such as making new cells. Cholesterol is made by your body. It also comes from food you eat. High cholesterol means that you have too much of the fat in your blood. This raises your risk of a heart attack and stroke. LDL and HDL are part of your total cholesterol. LDL is the \"bad\" cholesterol. High LDL can raise your risk for heart disease, heart attack, and stroke. HDL is the \"good\" cholesterol. It helps clear bad cholesterol from the body. High HDL is linked with a lower risk of heart disease, heart attack, and stroke. Your cholesterol levels help your doctor find out your risk for having a heart attack or stroke. You and your doctor can talk about whether you need to lower your risk and what treatment is best for you.   A heart-healthy lifestyle along with medicines can help lower your cholesterol and your risk. The way you choose to lower your risk will depend on how high your risk is for heart attack and stroke. It will also depend on how you feel about taking medicines. Follow-up care is a key part of your treatment and safety. Be sure to make and go to all appointments, and call your doctor if you are having problems. It's also a good idea to know your test results and keep a list of the medicines you take. How can you care for yourself at home? · Eat a variety of foods every day. Good choices include fruits, vegetables, whole grains (like oatmeal), dried beans and peas, nuts and seeds, soy products (like tofu), and fat-free or low-fat dairy products. · Replace butter, margarine, and hydrogenated or partially hydrogenated oils with olive and canola oils. (Canola oil margarine without trans fat is fine.)  · Replace red meat with fish, poultry, and soy protein (like tofu). · Limit processed and packaged foods like chips, crackers, and cookies. · Bake, broil, or steam foods. Don't mills them. · Be physically active. Get at least 30 minutes of exercise on most days of the week. Walking is a good choice. You also may want to do other activities, such as running, swimming, cycling, or playing tennis or team sports. · Stay at a healthy weight or lose weight by making the changes in eating and physical activity listed above. Losing just a small amount of weight, even 5 to 10 pounds, can reduce your risk for having a heart attack or stroke. · Do not smoke. When should you call for help? Watch closely for changes in your health, and be sure to contact your doctor if:    · You need help making lifestyle changes.     · You have questions about your medicine. Where can you learn more? Go to http://www.gray.com/  Enter M291 in the search box to learn more about \"High Cholesterol: Care Instructions. \"  Current as of: August 31, 2020               Content Version: 12.8  © 7709-4901 [employer]. Care instructions adapted under license by [employer] (which disclaims liability or warranty for this information). If you have questions about a medical condition or this instruction, always ask your healthcare professional. [address] any warranty or liability for your use of this information.

## 2021-08-30 NOTE — PROGRESS NOTES
Maycol Baez  79 y.o. female  1950  [address]  [address] [facility] [address] [address]  [id number]   [address]: Progress Note  Diana Weber MD       Encounter Date: 8/30/2021    Chief Complaint   Patient presents with    Diabetes     Patient states she is here for a diabetes follow up. She would also like to discuss body pain. States taking too much over the counter pain medication. History of Present Illness   Maycol Baez is a 79 y.o. female who presents to clinic today for follow-up on her diabetes. Also notes that she is having troubles with \"body pain. \"      DM: Went to get some more strips, but the code on the strip bottle is different than her initial bottle. She is concerned that the strips aren't working right. General Body Pain: Is hurting all over. She is taking OTC \"pain pill\". She feels she may be taking too many pain pills. Isnt doing too much any more since she stopped working at [employer]. Review of Systems   Review of Systems   Constitutional: Negative. Respiratory: Negative. Cardiovascular: Negative. Genitourinary: Negative. Musculoskeletal: Positive for joint pain and myalgias. Vitals/Objective:     Vitals:    08/30/21 1318   BP: (!) 157/72   Pulse: 91   Resp: 16   Temp: 97.7 °F (36.5 °C)   TempSrc: Temporal   SpO2: 97%   Weight: 156 lb 3.2 oz (70.9 kg)   Height: 5' 3\" (1.6 m)     Body mass index is 27.67 kg/m². General: Patient alert and oriented and in NAD    Heart: Regular rate and rhythm, No murmurs, rubs or gallops.   2+ peripheral pulses  Lungs: Clear to auscultation bilaterally, no wheezing, rales or rhonchi  Abd: +BS, non-tender, non-distended  Ext: No edema  Skin: No rashes or lesions noted on exposed skin,  Psych: Appropriate mood and affect         Diabetic foot exam performed by Diana Weber MD   Measurement  Response   Monofilament  R - 6/6  L - 6/6   Pulse DP R - present  L - present   Pulse TP R - present  L - present   Structural deformity R - None  L - None   Skin Integrity / Deformity R - None  L - None                  Assessment and Plan:   1. Type 2 diabetes with nephropathy ([address]  Getting updated labs. Last A1c 12.5%. Will refill metformin.   - HEMOGLOBIN A1C WITH EAG; Future  -  DIABETES FOOT EXAM  - atorvastatin (LIPITOR) 80 mg tablet; Take 1 Tablet by mouth daily. Dispense: 30 Tablet; Refill: 2  - metFORMIN (GLUCOPHAGE) 500 mg tablet; Take 2 Tablets by mouth two (2) times daily (with meals). Dispense: 60 Tablet; Refill: 1  - CBC W/O DIFF; Future  - METABOLIC PANEL, BASIC; Future    2. Hypertriglyceridemia  Discussed risk/benefits of statin therapy in patients with diabetes. Blood sugar improving. Last . Will get updated lipid panel.   - LIPID PANEL; Future  - atorvastatin (LIPITOR) 80 mg tablet; Take 1 Tablet by mouth daily. Dispense: 30 Tablet; Refill: 2    3. Encounter for screening mammogram for malignant neoplasm of breast  - [facility] MAMMO BI SCREENING INCL CAD; Future    4. Postmenopausal status, age-related  - DEXA BONE DENSITY STUDY AXIAL; Future    5. Restless leg  - FERRITIN; Future  - CBC W/O DIFF; Future    6. Iron deficiency anemia, unspecified iron deficiency anemia type  - FERRITIN; Future  - CBC W/O DIFF; Future    7. Arthralgia, unspecified joint  Discussed appropriate tylenol and ibuprofen doses. Add  Osteo bi-flex.   - glucosamine-chondroitin (Osteo Bi-Flex) 250-200 mg tab; Use as directed      I have discussed the diagnosis with the patient and the intended plan as seen in the above orders. she has expressed understanding. The patient has received an after-visit summary and questions were answered concerning future plans. I have discussed medication side effects and warnings with the patient as well. Follow-up and Dispositions  ·   Return in about 3 months (around 11/30/2021).            Electronically Signed: Samuel Tracy MD     History   Patients past medical, surgical and family histories were reviewed and updated. Past Medical History:   Diagnosis Date    Diverticulosis 2015    Hard of hearing     Hypertension     Iron deficiency anemia     baseline 9.7 on 2015    PUD (peptic ulcer disease)     hospitalized at [facility], EGD showed a nonbleeding esophageal ulcer, multiple gastric ulcers    Pulmonary nodule 2015    CT chest: 2.8 cm focal airspace disease RUL, pna vs mass, referred to pulm with PET    Type 2 diabetes mellitus without complication, without long-term current use of insulin ([facility] [address] 2015     Past Surgical History:   Procedure Laterality Date    HX MALIGNANT SKIN LESION EXCISION Right under  eye    HX PARTIAL HYSTERECTOMY Bilateral      Family History   Problem Relation Age of Onset    Diabetes Mother     Hypertension Mother     Alzheimer Mother     Pacemaker Mother     Heart Attack Father      Social History     Socioeconomic History    Marital status:      Spouse name: Not on file    Number of children: Not on file    Years of education: Not on file    Highest education level: Not on file   Occupational History    Not on file   Tobacco Use    Smoking status: Former Smoker     Packs/day: 0.25     Years: 30.00     Pack years: 7.50     Quit date: 10/16/2016     Years since quittin.8    Smokeless tobacco: Never Used   Vaping Use    Vaping Use: Never used   Substance and Sexual Activity    Alcohol use: Yes     Alcohol/week: 0.0 standard drinks     Comment: very rare    Drug use: No    Sexual activity: Not on file   Other Topics Concern    Not on file   Social History Narrative    Not on file     Social Determinants of Health     Financial Resource Strain:     Difficulty of Paying Living Expenses:    Food Insecurity:     Worried About Running Out of Food in the Last Year:     [address] in the Last Year:    Transportation Needs:     Lack of Transportation (Medical):      Lack of Transportation (Non-Medical):    Physical Activity:     Days of Exercise per Week:     Minutes of Exercise per Session:    Stress:     Feeling of Stress :    Social Connections:     Frequency of Communication with Friends and Family:     Frequency of Social Gatherings with Friends and Family:     Attends Voodoo Services:     Active Member of Clubs or Organizations:     Attends Club or Organization Meetings:     Marital Status:    Intimate Partner Violence:     Fear of Current or Ex-Partner:     Emotionally Abused:     Physically Abused:     Sexually Abused:             Current Medications/Allergies     Current Outpatient Medications   Medication Sig Dispense Refill    atorvastatin (LIPITOR) 80 mg tablet Take 1 Tablet by mouth daily. 30 Tablet 2    metFORMIN (GLUCOPHAGE) 500 mg tablet Take 2 Tablets by mouth two (2) times daily (with meals). 60 Tablet 1    glucosamine-chondroitin (Osteo Bi-Flex) 250-200 mg tab Use as directed      amLODIPine (NORVASC) 10 mg tablet TAKE ONE TABLET BY MOUTH DAILY 90 Tablet 3    losartan (COZAAR) 100 mg tablet TAKE ONE TABLET BY MOUTH DAILY 90 Tablet 3    glipiZIDE (GLUCOTROL) 5 mg tablet Take 1 Tablet by mouth daily. 90 Tablet 1    lancets misc Use to check blood sugar once daily 100 Each 3    Blood-Glucose Meter (True Metrix Air Glucose Meter) monitoring kit Use to check blood glucose daily 1 Kit 0    glucose blood VI test strips (True Metrix Glucose Test Strip) strip Use to check blood sugar once daily 100 Strip 3    alcohol swabs (Alcohol Pads) padm Use to clean fingers before checking blood sugar 100 Pad 3    albuterol (PROVENTIL HFA, VENTOLIN HFA, PROAIR HFA) 90 mcg/actuation inhaler Take 2 Puffs by inhalation every four (4) hours as needed for Wheezing. 1 Inhaler 5    acetaminophen (TYLENOL) 500 mg tablet Take  by mouth every six (6) hours as needed for Pain.        No Known Allergies

## 2021-08-31 LAB
ANION GAP SERPL CALC-SCNC: 6 MMOL/L (ref 5–15)
BUN SERPL-MCNC: 12 MG/DL (ref 6–20)
BUN/CREAT SERPL: 19 (ref 12–20)
CALCIUM SERPL-MCNC: 9.3 MG/DL (ref 8.5–10.1)
CHLORIDE SERPL-SCNC: 101 MMOL/L (ref 97–108)
CHOLEST SERPL-MCNC: 103 MG/DL
CO2 SERPL-SCNC: 30 MMOL/L (ref 21–32)
CREAT SERPL-MCNC: 0.64 MG/DL (ref 0.55–1.02)
ERYTHROCYTE [DISTWIDTH] IN BLOOD BY AUTOMATED COUNT: 12.5 % (ref 11.5–14.5)
EST. AVERAGE GLUCOSE BLD GHB EST-MCNC: 194 MG/DL
FERRITIN SERPL-MCNC: 103 NG/ML (ref 8–252)
GLUCOSE SERPL-MCNC: 176 MG/DL (ref 65–100)
HBA1C MFR BLD: 8.4 % (ref 4–5.6)
HCT VFR BLD AUTO: 41 % (ref 35–47)
HDLC SERPL-MCNC: 39 MG/DL
HDLC SERPL: 2.6 {RATIO} (ref 0–5)
HGB BLD-MCNC: 13 G/DL (ref 11.5–16)
LDLC SERPL CALC-MCNC: 27.2 MG/DL (ref 0–100)
MCH RBC QN AUTO: 31 PG (ref 26–34)
MCHC RBC AUTO-ENTMCNC: 31.7 G/DL (ref 30–36.5)
MCV RBC AUTO: 97.6 FL (ref 80–99)
NRBC # BLD: 0 K/UL (ref 0–0.01)
NRBC BLD-RTO: 0 PER 100 WBC
PLATELET # BLD AUTO: 282 K/UL (ref 150–400)
PMV BLD AUTO: 11.3 FL (ref 8.9–12.9)
POTASSIUM SERPL-SCNC: 4.4 MMOL/L (ref 3.5–5.1)
RBC # BLD AUTO: 4.2 M/UL (ref 3.8–5.2)
SODIUM SERPL-SCNC: 137 MMOL/L (ref 136–145)
TRIGL SERPL-MCNC: 184 MG/DL (ref ?–150)
VLDLC SERPL CALC-MCNC: 36.8 MG/DL
WBC # BLD AUTO: 9 K/UL (ref 3.6–11)

## 2021-11-01 DIAGNOSIS — E11.21 TYPE 2 DIABETES WITH NEPHROPATHY (HCC): ICD-10-CM

## 2021-11-03 RX ORDER — METFORMIN HYDROCHLORIDE 1000 MG/1
1000 TABLET ORAL 2 TIMES DAILY WITH MEALS
Qty: 180 TABLET | Refills: 4 | Status: SHIPPED | OUTPATIENT
Start: 2021-11-03

## 2022-03-09 ENCOUNTER — OFFICE VISIT (OUTPATIENT)
Dept: FAMILY MEDICINE CLINIC | Age: 72
End: 2022-03-09
Payer: MEDICARE

## 2022-03-09 VITALS
HEART RATE: 94 BPM | WEIGHT: 157.6 LBS | BODY MASS INDEX: 27.93 KG/M2 | DIASTOLIC BLOOD PRESSURE: 89 MMHG | HEIGHT: 63 IN | OXYGEN SATURATION: 94 % | SYSTOLIC BLOOD PRESSURE: 179 MMHG | RESPIRATION RATE: 16 BRPM

## 2022-03-09 DIAGNOSIS — R51.9 NONINTRACTABLE EPISODIC HEADACHE, UNSPECIFIED HEADACHE TYPE: ICD-10-CM

## 2022-03-09 DIAGNOSIS — Z12.31 ENCOUNTER FOR SCREENING MAMMOGRAM FOR MALIGNANT NEOPLASM OF BREAST: ICD-10-CM

## 2022-03-09 DIAGNOSIS — E11.21 TYPE 2 DIABETES WITH NEPHROPATHY (HCC): Primary | ICD-10-CM

## 2022-03-09 DIAGNOSIS — I10 PRIMARY HYPERTENSION: ICD-10-CM

## 2022-03-09 DIAGNOSIS — R29.6 FREQUENT FALLS: ICD-10-CM

## 2022-03-09 DIAGNOSIS — Z78.0 POSTMENOPAUSAL STATUS, AGE-RELATED: ICD-10-CM

## 2022-03-09 DIAGNOSIS — Z87.311 HISTORY OF PATHOLOGIC FRACTURE: ICD-10-CM

## 2022-03-09 PROCEDURE — 3051F HG A1C>EQUAL 7.0%<8.0%: CPT | Performed by: FAMILY MEDICINE

## 2022-03-09 PROCEDURE — 2022F DILAT RTA XM EVC RTNOPTHY: CPT | Performed by: FAMILY MEDICINE

## 2022-03-09 PROCEDURE — 99214 OFFICE O/P EST MOD 30 MIN: CPT | Performed by: FAMILY MEDICINE

## 2022-03-09 PROCEDURE — 3017F COLORECTAL CA SCREEN DOC REV: CPT | Performed by: FAMILY MEDICINE

## 2022-03-09 PROCEDURE — G8400 PT W/DXA NO RESULTS DOC: HCPCS | Performed by: FAMILY MEDICINE

## 2022-03-09 PROCEDURE — 1090F PRES/ABSN URINE INCON ASSESS: CPT | Performed by: FAMILY MEDICINE

## 2022-03-09 PROCEDURE — 1101F PT FALLS ASSESS-DOCD LE1/YR: CPT | Performed by: FAMILY MEDICINE

## 2022-03-09 PROCEDURE — G8419 CALC BMI OUT NRM PARAM NOF/U: HCPCS | Performed by: FAMILY MEDICINE

## 2022-03-09 PROCEDURE — G8754 DIAS BP LESS 90: HCPCS | Performed by: FAMILY MEDICINE

## 2022-03-09 PROCEDURE — G8427 DOCREV CUR MEDS BY ELIG CLIN: HCPCS | Performed by: FAMILY MEDICINE

## 2022-03-09 PROCEDURE — G8432 DEP SCR NOT DOC, RNG: HCPCS | Performed by: FAMILY MEDICINE

## 2022-03-09 PROCEDURE — G8536 NO DOC ELDER MAL SCRN: HCPCS | Performed by: FAMILY MEDICINE

## 2022-03-09 PROCEDURE — G8753 SYS BP > OR = 140: HCPCS | Performed by: FAMILY MEDICINE

## 2022-03-09 NOTE — PROGRESS NOTES
Chief Complaint   Patient presents with    Follow Up Chronic Condition     Seen in ER due to R side weakness, tingling. Daughter states every day the patient has a headache. Having terrible body pains. ER said they saw severe arthritis in her back on imaging. Cannot walk by herself, does fall. Patient states she feels off-balance all the time.      Visit Vitals  BP (!) 179/89 (BP 1 Location: Right arm, BP Patient Position: Sitting, BP Cuff Size: Adult)   Pulse 94   Resp 16   Ht 5' 3\" (1.6 m)   Wt 157 lb 9.6 oz (71.5 kg)   SpO2 94%   BMI 27.92 kg/m²

## 2022-03-09 NOTE — PROGRESS NOTES
Kenneth Dasilva  70 y.o. female  1950  [address] [facility] [address]  [id number]   [address]: Progress Note  Francia Saha MD       Encounter Date: 3/9/2022    Chief Complaint   Patient presents with    Follow Up Chronic Condition     Seen in ER due to R side weakness, tingling. Daughter states every day the patient has a headache. Having terrible body pains. ER said they saw severe arthritis in her back on imaging. Cannot walk by herself, does fall. Patient states she feels off-balance all the time. History of Present Illness   Kenneth Dasilva is a 70 y.o. female who presents to clinic today for follow-up from an ED visit. Here today with her daughter    Daily headaches. Right sided weakness (hand/leg). Went to [facility] for evaluation. Records requested. Head hurts in the AM.  Feels like she wants to cut off her head. Frontal headache mostly. Takes 2 pills in AM and it will go away. 3-4 hours later she has to take another 2 pills. Headache is pressure like. No photophobia. Deaf (so no phonophobia). Dizziness, especially when you bend over and then stand back up. Sleeps in recliner. Has tolds on to get herself up. Has some nausea (but may not be related to her headache). Still having issues with fecal incontinence. Falling at home. Always feels off balance. Knees seem to give out. Had a recent fall where she lost balance getting on bleechers. Went down on both knees. HTN: Blood pressure elevated today. Unclear if she took her medication today. Review of Systems   Review of Systems   Constitutional: Negative for chills, fever and weight loss. HENT: Positive for hearing loss. Respiratory: Negative. Cardiovascular: Negative. Negative for leg swelling. Musculoskeletal: Positive for falls and joint pain. Neurological: Positive for headaches.        Vitals/Objective:     Vitals:    03/09/22 1604   BP: (!) 179/89   Pulse: 94   Resp: 16   SpO2: 94%   Weight: 157 lb 9.6 oz (71.5 kg)   Height: 5' 3\" (1.6 m)     Body mass index is 27.92 kg/m². General: Patient alert and oriented and in NAD  HEENT: PER/EOMI, no conjunctival pallor or scleral icterus. No thyromegaly or cervical lymphadenopathy. Hard of hearing  Heart: Regular rate and rhythm, No murmurs, rubs or gallops. 2+ peripheral pulses  Lungs: Clear to auscultation bilaterally, no wheezing, rales or rhonchi  MSK: antalgic gait  Neuro: No facial droop, speech fluent, EOMI, Pupils equally round and reactive to light. Hearing decreased ,smile symmetrical, puffs out cheeks symmetrically. Shoulder shrug symmetrical.   moves all extremities, strength/sensationseem intact and symmetrical.  Has significant difficulty getting out of chair without use of arms. balance seems OK, no pronator drift. no tenderness of C spine, T spine, LS spine  Ext: No edema    Assessment and Plan:   1. Type 2 diabetes with nephropathy ([facility] [address]  Will get updated labs. Continue metformin and glipizide at this time. Keep log of blood sugars.  - HEMOGLOBIN A1C WITH EAG; Future  - LIPID PANEL; Future  - METABOLIC PANEL, BASIC; Future    2. Primary hypertension  BP uncontrolled and likely culprit of headaches. Reports BP at home some better. Will have her keep a log of her BP for the next 1-2 weeks. Follow-up with her in ~2 weeks. Will likely need to add third agent (thiazide likely) at follow-up    3. Encounter for screening mammogram for malignant neoplasm of breast  - [facility] MAMMO BI SCREENING INCL CAD; Future    4. Postmenopausal status, age-related  - DEXA BONE DENSITY STUDY AXIAL; Future    5. History of pathologic fracture  - DEXA BONE DENSITY STUDY AXIAL; Future    6. Nonintractable episodic headache, unspecified headache type  Can be severe in nature. Does not seem to be migrainous. DDx includes tension vs HTN-mediated. Check BP during headaches. Keep daily log.   While NSAID would be ideal for tension, could cause rise in BP. Use tylenol for headache    7. Frequent falls  Would benefit from physical therapy. I have discussed the diagnosis with the patient and the intended plan as seen in the above orders. she has expressed understanding. The patient has received an after-visit summary and questions were answered concerning future plans. I have discussed medication side effects and warnings with the patient as well. Future Appointments   Date Time Provider Dennise Gilli   4/5/2022 11:00 AM Bekah Christian MD [facility]       Electronically Signed: Allie Galindo MD     History   Patients past medical, surgical and family histories were reviewed and updated.     Past Medical History:   Diagnosis Date    Diverticulosis 9/23/2015    Hard of hearing     Hypertension     Iron deficiency anemia     baseline 9.7 on 8/2015    PUD (peptic ulcer disease)     hospitalized at [facility], EGD showed a nonbleeding esophageal ulcer, multiple gastric ulcers    Pulmonary nodule 8/2/2015    CT chest: 2.8 cm focal airspace disease RUL, pna vs mass, referred to pulm with PET    Type 2 diabetes mellitus without complication, without long-term current use of insulin ([facility] [address] 9/12/2015     Past Surgical History:   Procedure Laterality Date    HX MALIGNANT SKIN LESION EXCISION Right under  eye    HX PARTIAL HYSTERECTOMY Bilateral      Family History   Problem Relation Age of Onset    Diabetes Mother     Hypertension Mother     Alzheimer's Disease Mother     Pacemaker Mother     Heart Attack Father      Social History     Socioeconomic History    Marital status:      Spouse name: Not on file    Number of children: Not on file    Years of education: Not on file    Highest education level: Not on file   Occupational History    Not on file   Tobacco Use    Smoking status: Former Smoker     Packs/day: 0.25     Years: 30.00     Pack years: 7.50     Quit date: 10/16/2016     Years since quittin.4    Smokeless tobacco: Never Used   Vaping Use    Vaping Use: Never used   Substance and Sexual Activity    Alcohol use: Yes     Alcohol/week: 0.0 standard drinks     Comment: very rare    Drug use: No    Sexual activity: Not on file   Other Topics Concern    Not on file   Social History Narrative    Not on file     Social Determinants of Health     Financial Resource Strain:     Difficulty of Paying Living Expenses: Not on file   Food Insecurity:     Worried About Running Out of Food in the Last Year: Not on file    Bere of Food in the Last Year: Not on file   Transportation Needs:     Lack of Transportation (Medical): Not on file    Lack of Transportation (Non-Medical):  Not on file   Physical Activity:     Days of Exercise per Week: Not on file    Minutes of Exercise per Session: Not on file   Stress:     Feeling of Stress : Not on file   Social Connections:     Frequency of Communication with Friends and Family: Not on file    Frequency of Social Gatherings with Friends and Family: Not on file    Attends Yazidi Services: Not on file    Active Member of [address] [place] [address] or Organizations: Not on file    Attends Club or Organization Meetings: Not on file    Marital Status: Not on file   Intimate Partner Violence:     Fear of Current or Ex-Partner: Not on file    Emotionally Abused: Not on file    Physically Abused: Not on file    Sexually Abused: Not on file   Housing Stability:     Unable to Pay for Housing in the Last Year: Not on file    Number of [place] in the Last Year: Not on file    Unstable Housing in the Last Year: Not on file            Current Medications/Allergies     Current Outpatient Medications   Medication Sig Dispense Refill    atorvastatin (LIPITOR) 80 mg tablet TAKE ONE TABLET BY MOUTH DAILY 90 Tablet 1    glipiZIDE (GLUCOTROL) 5 mg tablet TAKE ONE TABLET BY MOUTH DAILY 90 Tablet 4    metFORMIN (GLUCOPHAGE) 1,000 mg tablet Take 1 Tablet by mouth two (2) times daily (with meals). 180 Tablet 4    glucosamine-chondroitin (Osteo Bi-Flex) 250-200 mg tab Use as directed      amLODIPine (NORVASC) 10 mg tablet TAKE ONE TABLET BY MOUTH DAILY 90 Tablet 3    losartan (COZAAR) 100 mg tablet TAKE ONE TABLET BY MOUTH DAILY 90 Tablet 3    lancets misc Use to check blood sugar once daily 100 Each 3    Blood-Glucose Meter (True Metrix Air Glucose Meter) monitoring kit Use to check blood glucose daily 1 Kit 0    glucose blood VI test strips (True Metrix Glucose Test Strip) strip Use to check blood sugar once daily 100 Strip 3    alcohol swabs (Alcohol Pads) padm Use to clean fingers before checking blood sugar 100 Pad 3    albuterol (PROVENTIL HFA, VENTOLIN HFA, PROAIR HFA) 90 mcg/actuation inhaler Take 2 Puffs by inhalation every four (4) hours as needed for Wheezing. 1 Inhaler 5    acetaminophen (TYLENOL) 500 mg tablet Take  by mouth every six (6) hours as needed for Pain.        No Known Allergies

## 2022-03-15 ENCOUNTER — LAB ONLY (OUTPATIENT)
Dept: FAMILY MEDICINE CLINIC | Age: 72
End: 2022-03-15
Payer: MEDICARE

## 2022-03-15 DIAGNOSIS — E11.21 TYPE 2 DIABETES WITH NEPHROPATHY (HCC): ICD-10-CM

## 2022-03-15 PROCEDURE — 83036 HEMOGLOBIN GLYCOSYLATED A1C: CPT

## 2022-03-16 LAB
ANION GAP SERPL CALC-SCNC: 5 MMOL/L (ref 5–15)
BUN SERPL-MCNC: 22 MG/DL (ref 6–20)
BUN/CREAT SERPL: 35 (ref 12–20)
CALCIUM SERPL-MCNC: 9.4 MG/DL (ref 8.5–10.1)
CHLORIDE SERPL-SCNC: 103 MMOL/L (ref 97–108)
CHOLEST SERPL-MCNC: 120 MG/DL
CO2 SERPL-SCNC: 29 MMOL/L (ref 21–32)
CREAT SERPL-MCNC: 0.62 MG/DL (ref 0.55–1.02)
EST. AVERAGE GLUCOSE BLD GHB EST-MCNC: 171 MG/DL
GLUCOSE SERPL-MCNC: 171 MG/DL (ref 65–100)
HBA1C MFR BLD: 7.6 % (ref 4–5.6)
HDLC SERPL-MCNC: 37 MG/DL
HDLC SERPL: 3.2 {RATIO} (ref 0–5)
LDLC SERPL CALC-MCNC: 37.4 MG/DL (ref 0–100)
POTASSIUM SERPL-SCNC: 4 MMOL/L (ref 3.5–5.1)
SODIUM SERPL-SCNC: 137 MMOL/L (ref 136–145)
TRIGL SERPL-MCNC: 228 MG/DL (ref ?–150)
VLDLC SERPL CALC-MCNC: 45.6 MG/DL

## 2022-03-18 PROBLEM — E11.21 TYPE 2 DIABETES WITH NEPHROPATHY (HCC): Status: ACTIVE | Noted: 2018-02-22

## 2022-04-04 NOTE — PROGRESS NOTES
Griselda Mallow  70 y.o. female  1950  [address] [facility] [address]  [id number]   [address]: Progress Note  Grant Bustamante MD       Encounter Date: 4/5/2022    Chief Complaint   Patient presents with    Results     Patient states she is here to discuss her lab results. She did not bring her bp or bs log. History of Present Illness   Griselda Mallow is a 70 y.o. female who presents to clinic today for follow-up on her blood pressure and blood sugar. Did not bring the log of these with her. She is alone at today's appointment, but daughter joined by phone. Since last appointment she notes her headache is doing some better. She did have another fall getting into the truck. Lost balance and hit the ground. Noted a little pain in her ankle. But this is getting better. Denied LOC or head trauma. Going to beach with daughter tomorrow. Is nervous about this. Got a blood pressure cuff: This AM:   Blood sugar 150  Blood pressure: 150-170s/80-90s. Reviewed labs with patient.   Recent Results (from the past 672 hour(s))   METABOLIC PANEL, BASIC    Collection Time: 03/15/22  9:15 AM   Result Value Ref Range    Sodium 137 136 - 145 mmol/L    Potassium 4.0 3.5 - 5.1 mmol/L    Chloride 103 97 - 108 mmol/L    CO2 29 21 - 32 mmol/L    Anion gap 5 5 - 15 mmol/L    Glucose 171 (H) 65 - 100 mg/dL    BUN 22 (H) 6 - 20 MG/DL    Creatinine 0.62 0.55 - 1.02 MG/DL    BUN/Creatinine ratio 35 (H) 12 - 20      GFR est AA >60 >60 ml/min/1.73m2    GFR est non-AA >60 >60 ml/min/1.73m2    Calcium 9.4 8.5 - 10.1 MG/DL   LIPID PANEL    Collection Time: 03/15/22  9:15 AM   Result Value Ref Range    Cholesterol, total 120 <200 MG/DL    Triglyceride 228 (H) <150 MG/DL    HDL Cholesterol 37 MG/DL    LDL, calculated 37.4 0 - 100 MG/DL    VLDL, calculated 45.6 MG/DL    CHOL/HDL Ratio 3.2 0.0 - 5.0     HEMOGLOBIN A1C WITH EAG    Collection Time: 03/15/22  9:15 AM   Result Value Ref Range    Hemoglobin A1c 7.6 (H) 4.0 - 5.6 %    Est. average glucose 171 mg/dL     CT Chest, abd w/ contrast 10/22/2016  IMPRESSION:  1. CHEST: Significant improvement in micronodular interstitial lung disease and lymphadenopathy, WITH STABLE FOCAL OPACITIES IN THE RIGHT MIDLUNG FIELD AND LEFT MIDLUNG FIELD. 2..ABDOMEN: Pneumoperitoneum with ascites. No obvious focal inflammatory or neoplastic abnormality of the stomach or visualized bowel is identified. Review of Systems   Review of Systems   Constitutional: Negative for chills and fever. Respiratory: Negative. Cardiovascular: Negative. Gastrointestinal: Negative. Genitourinary: Negative. Musculoskeletal: Positive for falls and joint pain (Knee). Neurological: Headaches: Improving. Endo/Heme/Allergies: Negative for polydipsia. Vitals/Objective:     Vitals:    04/05/22 1112   BP: (!) 160/80   Pulse: 95   Resp: 16   SpO2: 94%   Weight: 156 lb (70.8 kg)   Height: 5' 3\" (1.6 m)     Body mass index is 27.63 kg/m². General: Patient alert and oriented and in NAD, Hard of hearing  Heart: Regular rate and rhythm, No murmurs, rubs or gallops. 2+ peripheral pulses. Lungs: Clear to auscultation bilaterally, no wheezing, rales or rhonchi  Skin: No rashes or lesions noted on exposed skin,  Psych: Appropriate mood and affect      Assessment and Plan:   1. Lung nodule  > 5 years since last CT. At time lung nodules were stable. Does have smoking history. Quit in 2016. 7.5-15 Pack year history. At higher risk for malignancy. Will get updated check CT  - CT CHEST WO CONT; Future    2. Type 2 diabetes with nephropathy ([facility] [address]  Okay with current level of control. Goal to maintain A1c <8.0. No changes today. 3. Frequent falls  Would benefit from formal PT. Gait is antalgic and very unsteady. Uses a cane to aid in ambulation. They did not notice a lot of improvement with in-home PT.   Patient hesitant to pursuing in-office PT, but daughter is supportive of this idea. Contact information for [facility] given. Consider neuro imaging and neurology evaluation if symptoms persist.  Will also likely need ortho evaluation of knee, but patient not ready for this at this time.   - REFERRAL TO PHYSICAL THERAPY    4. Primary hypertension  Adding HCTZ to regimen and placing in combo pill. Will need BMP at follow-up. Continue to keep log.   - losartan-hydroCHLOROthiazide (HYZAAR) 100-12.5 mg per tablet; Take 1 Tablet by mouth daily. Dispense: 90 Tablet; Refill: 1      I have discussed the diagnosis with the patient and the intended plan as seen in the above orders. she has expressed understanding. The patient has received an after-visit summary and questions were answered concerning future plans. I have discussed medication side effects and warnings with the patient as well. Patient encounter was at > 40 minutes of total time spend on the date of the encounter: preparing to see the patient(reviewing prior notes and tests), obtaining and/or reviewing separately obtained history, performing a medially appropriate examination and/or evaluation, counseling and educating the patient/family/caregiver, ordering medications, tests or procedures, documenting clinical information in the electronic or other health record, independently interpreting results(not separately reported) and communicating results to the patient/family/caregiver and care coordination(not separately reported)        Follow-up and Dispositions  ·   Return in about 4 weeks (around 5/3/2022) for Blood Pressure Follow-up. Electronically Signed: Hailey Cesar MD     History   Patients past medical, surgical and family histories were reviewed and updated.     Past Medical History:   Diagnosis Date    Diverticulosis 9/23/2015    Hard of hearing     Hypertension     Iron deficiency anemia     baseline 9.7 on 8/2015    PUD (peptic ulcer disease) hospitalized at [facility], EGD showed a nonbleeding esophageal ulcer, multiple gastric ulcers    Pulmonary nodule 2015    CT chest: 2.8 cm focal airspace disease RUL, pna vs mass, referred to pulm with PET    Type 2 diabetes mellitus without complication, without long-term current use of insulin ([facility] 2015     Past Surgical History:   Procedure Laterality Date    HX MALIGNANT SKIN LESION EXCISION Right under  eye    HX PARTIAL HYSTERECTOMY Bilateral      Family History   Problem Relation Age of Onset    Diabetes Mother     Hypertension Mother     Alzheimer's Disease Mother     Pacemaker Mother     Heart Attack Father      Social History     Socioeconomic History    Marital status:      Spouse name: Not on file    Number of children: Not on file    Years of education: Not on file    Highest education level: Not on file   Occupational History    Not on file   Tobacco Use    Smoking status: Former Smoker     Packs/day: 0.25     Years: 30.00     Pack years: 7.50     Quit date: 10/16/2016     Years since quittin.4    Smokeless tobacco: Never Used   Vaping Use    Vaping Use: Never used   Substance and Sexual Activity    Alcohol use: Yes     Alcohol/week: 0.0 standard drinks     Comment: very rare    Drug use: No    Sexual activity: Not on file   Other Topics Concern    Not on file   Social History Narrative    Not on file     Social Determinants of Health     Financial Resource Strain:     Difficulty of Paying Living Expenses: Not on file   Food Insecurity:     Worried About Running Out of Food in the Last Year: Not on file    Bere of Food in the Last Year: Not on file   Transportation Needs:     Lack of Transportation (Medical): Not on file    Lack of Transportation (Non-Medical):  Not on file   Physical Activity:     Days of Exercise per Week: Not on file    Minutes of Exercise per Session: Not on file   Stress:     Feeling of Stress : Not on file   Social Connections:     Frequency of Communication with Friends and Family: Not on file    Frequency of Social Gatherings with Friends and Family: Not on file    Attends Buddhist Services: Not on file    Active Member of Clubs or Organizations: Not on file    Attends Club or Organization Meetings: Not on file    Marital Status: Not on file   Intimate Partner Violence:     Fear of Current or Ex-Partner: Not on file    Emotionally Abused: Not on file    Physically Abused: Not on file    Sexually Abused: Not on file   Housing Stability:     Unable to Pay for Housing in the Last Year: Not on file    Number of [place] in the Last Year: Not on file    Unstable Housing in the Last Year: Not on file            Current Medications/Allergies     Current Outpatient Medications   Medication Sig Dispense Refill    losartan-hydroCHLOROthiazide (HYZAAR) 100-12.5 mg per tablet Take 1 Tablet by mouth daily. 90 Tablet 1    atorvastatin (LIPITOR) 80 mg tablet TAKE ONE TABLET BY MOUTH DAILY 90 Tablet 1    glipiZIDE (GLUCOTROL) 5 mg tablet TAKE ONE TABLET BY MOUTH DAILY 90 Tablet 4    metFORMIN (GLUCOPHAGE) 1,000 mg tablet Take 1 Tablet by mouth two (2) times daily (with meals). 180 Tablet 4    glucosamine-chondroitin (Osteo Bi-Flex) 250-200 mg tab Use as directed      amLODIPine (NORVASC) 10 mg tablet TAKE ONE TABLET BY MOUTH DAILY 90 Tablet 3    lancets misc Use to check blood sugar once daily 100 Each 3    Blood-Glucose Meter (True Metrix Air Glucose Meter) monitoring kit Use to check blood glucose daily 1 Kit 0    glucose blood VI test strips (True Metrix Glucose Test Strip) strip Use to check blood sugar once daily 100 Strip 3    alcohol swabs (Alcohol Pads) padm Use to clean fingers before checking blood sugar 100 Pad 3    albuterol (PROVENTIL HFA, VENTOLIN HFA, PROAIR HFA) 90 mcg/actuation inhaler Take 2 Puffs by inhalation every four (4) hours as needed for Wheezing.  1 Inhaler 5    acetaminophen (TYLENOL) 500 mg tablet Take  by mouth every six (6) hours as needed for Pain.        No Known Allergies

## 2022-04-05 ENCOUNTER — OFFICE VISIT (OUTPATIENT)
Dept: FAMILY MEDICINE CLINIC | Age: 72
End: 2022-04-05
Payer: MEDICARE

## 2022-04-05 VITALS
HEART RATE: 95 BPM | WEIGHT: 156 LBS | SYSTOLIC BLOOD PRESSURE: 160 MMHG | HEIGHT: 63 IN | OXYGEN SATURATION: 94 % | DIASTOLIC BLOOD PRESSURE: 80 MMHG | RESPIRATION RATE: 16 BRPM | BODY MASS INDEX: 27.64 KG/M2

## 2022-04-05 DIAGNOSIS — R91.1 LUNG NODULE: Primary | ICD-10-CM

## 2022-04-05 DIAGNOSIS — I10 PRIMARY HYPERTENSION: ICD-10-CM

## 2022-04-05 DIAGNOSIS — R29.6 FREQUENT FALLS: ICD-10-CM

## 2022-04-05 DIAGNOSIS — E11.21 TYPE 2 DIABETES WITH NEPHROPATHY (HCC): ICD-10-CM

## 2022-04-05 PROCEDURE — G8753 SYS BP > OR = 140: HCPCS | Performed by: FAMILY MEDICINE

## 2022-04-05 PROCEDURE — 2022F DILAT RTA XM EVC RTNOPTHY: CPT | Performed by: FAMILY MEDICINE

## 2022-04-05 PROCEDURE — G8400 PT W/DXA NO RESULTS DOC: HCPCS | Performed by: FAMILY MEDICINE

## 2022-04-05 PROCEDURE — G9899 SCRN MAM PERF RSLTS DOC: HCPCS | Performed by: FAMILY MEDICINE

## 2022-04-05 PROCEDURE — 99215 OFFICE O/P EST HI 40 MIN: CPT | Performed by: FAMILY MEDICINE

## 2022-04-05 PROCEDURE — G8427 DOCREV CUR MEDS BY ELIG CLIN: HCPCS | Performed by: FAMILY MEDICINE

## 2022-04-05 PROCEDURE — G8536 NO DOC ELDER MAL SCRN: HCPCS | Performed by: FAMILY MEDICINE

## 2022-04-05 PROCEDURE — 3051F HG A1C>EQUAL 7.0%<8.0%: CPT | Performed by: FAMILY MEDICINE

## 2022-04-05 PROCEDURE — 1090F PRES/ABSN URINE INCON ASSESS: CPT | Performed by: FAMILY MEDICINE

## 2022-04-05 PROCEDURE — 3017F COLORECTAL CA SCREEN DOC REV: CPT | Performed by: FAMILY MEDICINE

## 2022-04-05 PROCEDURE — G8419 CALC BMI OUT NRM PARAM NOF/U: HCPCS | Performed by: FAMILY MEDICINE

## 2022-04-05 PROCEDURE — 1101F PT FALLS ASSESS-DOCD LE1/YR: CPT | Performed by: FAMILY MEDICINE

## 2022-04-05 PROCEDURE — G8432 DEP SCR NOT DOC, RNG: HCPCS | Performed by: FAMILY MEDICINE

## 2022-04-05 PROCEDURE — G8754 DIAS BP LESS 90: HCPCS | Performed by: FAMILY MEDICINE

## 2022-04-05 RX ORDER — LOSARTAN POTASSIUM AND HYDROCHLOROTHIAZIDE 12.5; 1 MG/1; MG/1
1 TABLET ORAL DAILY
Qty: 90 TABLET | Refills: 1 | Status: SHIPPED | OUTPATIENT
Start: 2022-04-05 | End: 2022-06-17 | Stop reason: SDUPTHER

## 2022-04-05 NOTE — PATIENT INSTRUCTIONS
How to Get Up Safely After a Fall: Care Instructions  Your Care Instructions     If you have injuries, health problems, or other reasons that may make it easy for you to fall at home, it is a good idea to learn how to get up safely after a fall. Learning how to get up correctly can help you avoid making an injury worse. Also, knowing what to do if you cannot get up can help you stay safe until help arrives. Follow-up care is a key part of your treatment and safety. Be sure to make and go to all appointments, and call your doctor if you are having problems. It's also a good idea to know your test results and keep a list of the medicines you take. How can you care for yourself after a fall? If you think you can get up  First lie still for a few minutes and think about how you feel. If your body feels okay and you think you can get up safely, follow the rest of the steps below:  1. Look for a chair or other piece of furniture that is close to you. 2. Roll onto your side and rest. Roll by turning your head in the direction you want to roll, move your shoulder and arm, then hip and leg in the same direction. 3. Lie still for a moment to let your blood pressure adjust.  4. Slowly push your upper body up, lift your head, and take a moment to rest.  5. Slowly get up on your hands and knees, and crawl to the chair or other stable piece of furniture. 6. Put your hands on the chair. 7. Move one foot forward, and place it flat on the floor. Your other leg should be bent with the knee on the floor. 8. Rise slowly, turn your body, and sit in the chair. Stay seated for a bit and think about how you feel. Call for help. Even if you feel okay, let someone know what happened to you. You might not know that you have a serious injury. If you cannot get up  1. If you think you are injured after a fall or you cannot get up, try not to panic. 2. Call out for help.   3. If you have a phone within reach or you have an emergency call device, use it to call for help. 4. If you do not have a phone within reach, try to slide yourself toward it. If you cannot get to the phone, try to slide toward a door or window or a place where you think you can be heard. 5. [place] or use an object to make noise so someone might hear you. 6. If you can reach something that you can use for a pillow, place it under your head. Try to stay warm by covering yourself with a blanket or clothing while you wait for help. When should you call for help? Call 911 anytime you think you may need emergency care. For example, call if:    · You passed out (lost consciousness).     · You cannot get up after a fall.     · You have severe pain. Call your doctor now or seek immediate medical care if:    · You have new or worse pain.     · You are dizzy or lightheaded.     · You hit your head. Watch closely for changes in your health, and be sure to contact your doctor if:    · You do not get better as expected. Where can you learn more? Go to http://www.bishop.com/  Enter G513 in the search box to learn more about \"How to Get Up Safely After a Fall: Care Instructions. \"  Current as of: September 8, 2021               Content Version: 13.2  © 5360-7365 [employer]. Care instructions adapted under license by [employer] (which disclaims liability or warranty for this information). If you have questions about a medical condition or this instruction, always ask your healthcare professional. [address] any warranty or liability for your use of this information.

## 2022-04-05 NOTE — LETTER
4/5/2022 12:09 PM    Ms. Nahum Roberts [id number]      Recent Results (from the past 672 hour(s))   METABOLIC PANEL, BASIC    Collection Time: 03/15/22  9:15 AM   Result Value Ref Range    Sodium 137 136 - 145 mmol/L    Potassium 4.0 3.5 - 5.1 mmol/L    Chloride 103 97 - 108 mmol/L    CO2 29 21 - 32 mmol/L    Anion gap 5 5 - 15 mmol/L    Glucose 171 (H) 65 - 100 mg/dL    BUN 22 (H) 6 - 20 MG/DL    Creatinine 0.62 0.55 - 1.02 MG/DL    BUN/Creatinine ratio 35 (H) 12 - 20      GFR est AA >60 >60 ml/min/1.73m2    GFR est non-AA >60 >60 ml/min/1.73m2    Calcium 9.4 8.5 - 10.1 MG/DL   LIPID PANEL    Collection Time: 03/15/22  9:15 AM   Result Value Ref Range    Cholesterol, total 120 <200 MG/DL    Triglyceride 228 (H) <150 MG/DL    HDL Cholesterol 37 MG/DL    LDL, calculated 37.4 0 - 100 MG/DL    VLDL, calculated 45.6 MG/DL    CHOL/HDL Ratio 3.2 0.0 - 5.0     HEMOGLOBIN A1C WITH EAG    Collection Time: 03/15/22  9:15 AM   Result Value Ref Range    Hemoglobin A1c 7.6 (H) 4.0 - 5.6 %    Est. average glucose 171 mg/dL

## 2022-04-05 NOTE — PROGRESS NOTES
Chief Complaint   Patient presents with    Results     Patient states she is here to discuss her lab results. She did not bring her bp or bs log.      Visit Vitals  BP (!) 160/80 (BP 1 Location: Left arm, BP Patient Position: Sitting, BP Cuff Size: Adult)   Pulse 95   Resp 16   Ht 5' 3\" (1.6 m)   Wt 156 lb (70.8 kg)   SpO2 94%   BMI 27.63 kg/m²

## 2022-06-07 DIAGNOSIS — J84.9 ILD (INTERSTITIAL LUNG DISEASE) (HCC): Primary | ICD-10-CM

## 2022-06-07 NOTE — TELEPHONE ENCOUNTER
----- Message from Frankey Rinks sent at 6/6/2022  3:37 PM EDT -----  Subject: Refill Request    QUESTIONS  Name of Medication? albuterol (PROVENTIL HFA, VENTOLIN HFA, PROAIR HFA) 90   mcg/actuation inhaler  Patient-reported dosage and instructions? not using as needed, trying to   save medication since she is running low  How many days do you have left? 0  [id number]  Pharmacy phone number (if available)? [phone]  Additional Information for Provider? Pt got a letter from [employer]   stating she could get $200 worth of medications. She & her daughter are   wondering if Dr. Sofie Franco can send over her inhaler since that is the most   expensive of her meds? Pt's daughter is wondering if he could send her   inhaler refills all at once; (5-6 refills if possible) so these would be   covered since this $200 can only be used once? Pt is struggling to afford   the inhalers & they would like to know if this is possible? Please call pt   regarding this asap.  ---------------------------------------------------------------------------  --------------  CALL BACK INFO  What is the best way for the office to contact you? OK to leave message on   voicemail  Preferred Call Back Phone Number? [phone]  ---------------------------------------------------------------------------  --------------  SCRIPT ANSWERS  Relationship to Patient? Other  Representative Name? Mushtaq Keating (daughter)  Is the Representative on the appropriate HIPAA document in Epic?  Yes

## 2022-06-08 PROBLEM — J84.9 ILD (INTERSTITIAL LUNG DISEASE) (HCC): Status: ACTIVE | Noted: 2022-06-08

## 2022-06-08 RX ORDER — ALBUTEROL SULFATE 90 UG/1
2 AEROSOL, METERED RESPIRATORY (INHALATION)
Qty: 2 EACH | Refills: 5 | Status: SHIPPED | OUTPATIENT
Start: 2022-06-08 | End: 2022-07-06 | Stop reason: SDUPTHER

## 2022-06-13 ENCOUNTER — TELEPHONE (OUTPATIENT)
Dept: FAMILY MEDICINE CLINIC | Age: 72
End: 2022-06-13

## 2022-06-13 NOTE — TELEPHONE ENCOUNTER
Tried to get in touch with Farida Domingo (daughter) to let her know that Albuterol has been sent to the pharmacy. I was not able to leave a message in voicemail, due to the mail box is full.

## 2022-06-13 NOTE — TELEPHONE ENCOUNTER
----- Message from Franciscoemeli Cota sent at 6/9/2022 12:41 PM EDT -----  Subject: Message to Provider    QUESTIONS  Information for Provider? Pt's daughter STEPHANYΡΑΝΤΙ calling for pt that sees   Dr. Annie Dexter to follow up on a refill request for Albuterol from 6/7. Please   call ΣΑΡΑΝΤΙ back w/ an update asap.  ---------------------------------------------------------------------------  --------------  CALL BACK INFO  What is the best way for the office to contact you? OK to leave message on   voicemail  Preferred Call Back Phone Number?  [phone]  ---------------------------------------------------------------------------  --------------  SCRIPT ANSWERS  undefined

## 2022-06-13 NOTE — TELEPHONE ENCOUNTER
----- Message from Ashley Putnam sent at 6/9/2022 12:41 PM EDT -----  Subject: Message to Provider    QUESTIONS  Information for Provider? Pt's daughter Lavell Cervantes calling for pt that sees   Dr. William Felton to follow up on a refill request for Albuterol from 6/7. Please   call Lavell Cervantes back w/ an update asap.  ---------------------------------------------------------------------------  --------------  CALL BACK INFO  What is the best way for the office to contact you? OK to leave message on   voicemail  Preferred Call Back Phone Number?  [phone]  ---------------------------------------------------------------------------  --------------  SCRIPT ANSWERS  undefined

## 2022-06-15 DIAGNOSIS — I10 PRIMARY HYPERTENSION: ICD-10-CM

## 2022-06-15 DIAGNOSIS — I10 ESSENTIAL HYPERTENSION: ICD-10-CM

## 2022-06-16 RX ORDER — AMLODIPINE BESYLATE 10 MG/1
TABLET ORAL
Qty: 30 TABLET | OUTPATIENT
Start: 2022-06-16

## 2022-06-16 RX ORDER — LOSARTAN POTASSIUM 100 MG/1
TABLET ORAL
Qty: 30 TABLET | Refills: 0 | OUTPATIENT
Start: 2022-06-16

## 2022-06-17 RX ORDER — LOSARTAN POTASSIUM AND HYDROCHLOROTHIAZIDE 12.5; 1 MG/1; MG/1
1 TABLET ORAL DAILY
Qty: 90 TABLET | Refills: 1 | Status: SHIPPED | OUTPATIENT
Start: 2022-06-17 | End: 2022-11-01

## 2022-06-17 RX ORDER — AMLODIPINE BESYLATE 10 MG/1
TABLET ORAL
Qty: 90 TABLET | Refills: 3 | Status: SHIPPED | OUTPATIENT
Start: 2022-06-17 | End: 2022-08-24

## 2022-07-04 RX ORDER — LANCETS 30 GAUGE
EACH MISCELLANEOUS
Qty: 100 LANCET | Refills: 3 | Status: SHIPPED | OUTPATIENT
Start: 2022-07-04

## 2022-07-06 DIAGNOSIS — J84.9 ILD (INTERSTITIAL LUNG DISEASE) (HCC): ICD-10-CM

## 2022-07-06 DIAGNOSIS — E11.21 TYPE 2 DIABETES WITH NEPHROPATHY (HCC): ICD-10-CM

## 2022-07-06 RX ORDER — ISOPROPYL ALCOHOL 70 ML/100ML
SWAB TOPICAL
Qty: 100 PAD | Refills: 3 | Status: SHIPPED | OUTPATIENT
Start: 2022-07-06

## 2022-07-06 RX ORDER — ALBUTEROL SULFATE 90 UG/1
2 AEROSOL, METERED RESPIRATORY (INHALATION)
Qty: 2 EACH | Refills: 5 | Status: SHIPPED | OUTPATIENT
Start: 2022-07-06

## 2022-07-19 NOTE — Clinical Note
Patient called to cancel todays appointment. Will need to reschedule. I prefer an in-person appointment. She does not have reliable wifi. Also needs labs. Please prescreen and schedule. Thank you!
Negative

## 2022-07-29 DIAGNOSIS — E11.21 TYPE 2 DIABETES WITH NEPHROPATHY (HCC): ICD-10-CM

## 2022-07-29 DIAGNOSIS — E78.1 HYPERTRIGLYCERIDEMIA: ICD-10-CM

## 2022-07-29 DIAGNOSIS — I10 ESSENTIAL HYPERTENSION: ICD-10-CM

## 2022-08-19 DIAGNOSIS — E11.21 TYPE 2 DIABETES WITH NEPHROPATHY (HCC): ICD-10-CM

## 2022-08-19 DIAGNOSIS — E78.1 HYPERTRIGLYCERIDEMIA: ICD-10-CM

## 2022-08-19 RX ORDER — ATORVASTATIN CALCIUM 80 MG/1
80 TABLET, FILM COATED ORAL DAILY
Qty: 90 TABLET | Refills: 1 | Status: SHIPPED | OUTPATIENT
Start: 2022-08-19 | End: 2022-11-01

## 2022-08-24 RX ORDER — AMLODIPINE BESYLATE 10 MG/1
TABLET ORAL
Qty: 90 TABLET | Refills: 3 | Status: SHIPPED
Start: 2022-08-24 | End: 2022-08-29 | Stop reason: SDUPTHER

## 2022-08-24 RX ORDER — ATORVASTATIN CALCIUM 80 MG/1
TABLET, FILM COATED ORAL
Qty: 90 TABLET | Refills: 1 | OUTPATIENT
Start: 2022-08-24

## 2022-08-29 RX ORDER — AMLODIPINE BESYLATE 10 MG/1
10 TABLET ORAL DAILY
Qty: 90 TABLET | Refills: 3 | Status: CANCELLED | OUTPATIENT
Start: 2022-08-29

## 2022-10-21 ENCOUNTER — TELEPHONE (OUTPATIENT)
Dept: FAMILY MEDICINE CLINIC | Age: 72
End: 2022-10-21

## 2022-10-21 NOTE — TELEPHONE ENCOUNTER
Attempted to reach patient to discuss breast cancer screening. Mammogram was ordered in March. Other imaging, including a DEXa and CT scan have been ordered since then. Unfortunately she has not had any of the imaging completed. Left message for patient to call back. Recommend patient call to schedule her imaging at [phone]. Will also send a letter.

## 2022-10-21 NOTE — LETTER
10/21/2022 3:39 PM    Ms. Nahum Roberts [id number]      October is National Breast Cancer Awareness Month. Our system identified you as being eligible for breast cancer screening by mammography. We had ordered you mammogram in March and it appears this has not yet been completed. You may call [phone] to schedule this and other imaging procedures. Did you know? · Breast cancer is the most common cancer among American women, except for skin cancers, and is the second leading cause of cancer death in women. · About 1 in 8 (12%) women in the [address] [facility] will develop invasive breast cancer during their lifetime. · The American Cancer Society;s estimates for breast cancer in the [place] for 2015 are:  · About 231,840 new cases of invasive breast cancer will be diagnosed in women  · About 60,290 new cases of carcinoma in situ (CIS) will be diagnosed (CIS is non-invasive and is the earliest form of breast cancer)  · About 40,290 women will die from breast cancer  · At this time there are more than 2.8 million breast cancer survivors in the [address] [place] [address],[address]. Currently, the [facility] States [place] (USPSTF) recommends that women ages 48 to 76 receive screening mammography every 2 years. This may be offered to women 36 to 52 who are at increased risk of breast cancer (e.g. Women with a parent, sibling, or child with breast cancer). A mammogram is an x-ray exam of the breast that is used to detect and evaluate breast changes. The whole procedure takes about 20 minutes. If you have additional questions regarding breast cancer or mammography you may visit the [address] [place], [place] [address] website at [email or web address]     Please call [phone] to schedule an appointment with your physician to discuss your breast cancer risk or simply request an order for your screening mammogram.    Sincerely,      Darwin Hart MD    P.S.     Many patients ask, Where can I get help with mammogram costs? \"Medicare, Medicaid, and all private health insurance policies created after March 23, 2010 cover screening mammogram costs. The new health care law requires that health insurance companies pay for screening mammograms. Insurance coverage is different for diagnostic mammograms, which usually cost more than screening mammograms. [address] has awarded [employer]' [employer] funds to offers screening for uninsured patients   For more information on the Every [employer] program at [employer], please call [phone] ([phone]). Low-cost mammograms are available in most areas. Call the [address] at [phone] for information about facilities in your area. The National Breast and Cervical Cancer Early Detection Program (NBCCEDP) also provides breast and cervical cancer early detection testing to women without health insurance for free or at very little cost. To learn more about this program, please contact the Centers for Disease Control and Prevention (CDC) at 7-064-CDC INFO (2-441.172.5879) or visit their website at www.cdc.gov/cancer. \" ([email or web address]. org).                 Sincerely,      Jose Keating MD

## 2022-11-01 DIAGNOSIS — I10 PRIMARY HYPERTENSION: ICD-10-CM

## 2022-11-01 DIAGNOSIS — E78.1 HYPERTRIGLYCERIDEMIA: ICD-10-CM

## 2022-11-01 DIAGNOSIS — E11.21 TYPE 2 DIABETES WITH NEPHROPATHY (HCC): ICD-10-CM

## 2022-11-01 RX ORDER — LOSARTAN POTASSIUM AND HYDROCHLOROTHIAZIDE 12.5; 1 MG/1; MG/1
TABLET ORAL
Qty: 90 TABLET | Refills: 1 | Status: SHIPPED | OUTPATIENT
Start: 2022-11-01

## 2022-11-01 RX ORDER — ATORVASTATIN CALCIUM 80 MG/1
TABLET, FILM COATED ORAL
Qty: 90 TABLET | Refills: 1 | Status: SHIPPED | OUTPATIENT
Start: 2022-11-01

## 2022-11-20 DIAGNOSIS — E11.21 TYPE 2 DIABETES WITH NEPHROPATHY (HCC): ICD-10-CM

## 2022-11-21 RX ORDER — METFORMIN HYDROCHLORIDE 1000 MG/1
TABLET ORAL
Qty: 180 TABLET | Refills: 4 | Status: SHIPPED | OUTPATIENT
Start: 2022-11-21

## 2022-12-07 ENCOUNTER — NURSE TRIAGE (OUTPATIENT)
Dept: OTHER | Facility: CLINIC | Age: 72
End: 2022-12-07

## 2022-12-07 NOTE — TELEPHONE ENCOUNTER
Location of patient: [address] [facility] triage due to pt not being with caller     Received call from Austin Goodrich at [facility] with [employer]. Subjective: Caller states \"Tingling in extremities\"     Current Symptoms:   DM neuropathy bilateral- worsening   HA- intermittent   Denies SOB   Denies head injurt  Denies pain           Onset: 1 month ago; worsening    Associated Symptoms: reduced activity    Pain Severity: none noted     Temperature: denies fever    What has been tried: Nothing noted     LMP: NA Pregnant: NA    Recommended disposition: See PCP within 3 Days    Care advice provided, patient verbalizes understanding; denies any other questions or concerns; instructed to call back for any new or worsening symptoms. Patient/Caller agrees with recommended disposition; writer provided warm transfer to [place]  at [facility] for appointment scheduling    Attention Provider: Thank you for allowing me to participate in the care of your patient. The patient was connected to triage in response to information provided to the ECC. Please do not respond through this encounter as the response is not directed to a shared pool.       Reason for Disposition   Numbness or tingling in one or both feet is a chronic symptom (recurrent or ongoing problem lasting > 4 weeks)    Protocols used: Neurologic Deficit-ADULT-[place]

## 2022-12-09 ENCOUNTER — OFFICE VISIT (OUTPATIENT)
Dept: FAMILY MEDICINE CLINIC | Age: 72
End: 2022-12-09
Payer: MEDICARE

## 2022-12-09 VITALS
OXYGEN SATURATION: 98 % | SYSTOLIC BLOOD PRESSURE: 148 MMHG | TEMPERATURE: 98 F | DIASTOLIC BLOOD PRESSURE: 82 MMHG | BODY MASS INDEX: 26.12 KG/M2 | HEART RATE: 95 BPM | HEIGHT: 63 IN | RESPIRATION RATE: 14 BRPM | WEIGHT: 147.4 LBS

## 2022-12-09 DIAGNOSIS — E11.21 TYPE 2 DIABETES WITH NEPHROPATHY (HCC): ICD-10-CM

## 2022-12-09 DIAGNOSIS — R53.1 RIGHT SIDED WEAKNESS: Primary | ICD-10-CM

## 2022-12-09 DIAGNOSIS — I10 ESSENTIAL HYPERTENSION: ICD-10-CM

## 2022-12-09 DIAGNOSIS — R30.0 DYSURIA: ICD-10-CM

## 2022-12-09 NOTE — PROGRESS NOTES
Juan Carlos Briones is a 70 y.o. female    Chief Complaint   Patient presents with    Tingling     Right side of body       1. Have you been to the ER, urgent care clinic since your last visit? Hospitalized since your last visit? No  2. Have you seen or consulted any other health care providers outside of the [address] [place] [address] since your last visit? Include any pap smears or colon screening. No    There were no vitals taken for this visit.   3 most recent PHQ Screens 12/9/2022   Little interest or pleasure in doing things Not at all   Feeling down, depressed, irritable, or hopeless Not at all   Total Score PHQ 2 0     Health Maintenance Due   Topic Date Due    Eye Exam Retinal or Dilated  Never done    DTaP/Tdap/Td series (1 - Tdap) Never done    Shingrix Vaccine Age 50> (1 of 2) Never done    Bone Densitometry (Dexa) Screening  Never done    Medicare Yearly Exam  10/01/2020    Breast Cancer Screen Mammogram  10/22/2020    COVID-19 Vaccine (3 - Booster for Pfizer series) 06/22/2021    MICROALBUMIN Q1  04/13/2022    Depression Screen  05/19/2022    Flu Vaccine (1) Never done    Foot Exam Q1  09/20/2022

## 2022-12-09 NOTE — PROGRESS NOTES
[facility] [address] [place] [address]   Office [phone], Fax [phone]    Subjective:     Chief Complaint   Patient presents with    Tingling     Right side of body       HPI:  Erum Alfaro is a 67 y.o. female with a history of T2DM that presents for: neuropathy       Neuropathy/ Tingling:   - history of T2DM,   - endorses poor diet control   - present for the last couple months   - went to ED for evaluation ([facility]) for right sided tingling, however stroke evaluation negative. - no history of prior stroke  - Right arm tingling the worst, present from fingers to shoulder. Present for the last 6 months, feels it maybe has gotten a little better. - never hurt neck and denies neck pain   - Glipizide 5mg daily and Metformin 1000mg daily     Dysuria:   - present for the last week, no blood    Health Maintenance:  Health Maintenance Due   Topic Date Due    Eye Exam Retinal or Dilated  Never done    DTaP/Tdap/Td series (1 - Tdap) Never done    Shingrix Vaccine Age 50> (1 of 2) Never done    Bone Densitometry (Dexa) Screening  Never done    Medicare Yearly Exam  10/01/2020    Breast Cancer Screen Mammogram  10/22/2020    COVID-19 Vaccine (3 - Booster for Pfizer series) 06/22/2021    MICROALBUMIN Q1  04/13/2022    Flu Vaccine (1) Never done    Foot Exam Q1  09/20/2022          Past Medical Hx  I personally reviewed. Past Medical History:   Diagnosis Date    Diverticulosis 9/23/2015    Hard of hearing     Hypertension     Iron deficiency anemia     baseline 9.7 on 8/2015    PUD (peptic ulcer disease)     hospitalized at [facility], EGD showed a nonbleeding esophageal ulcer, multiple gastric ulcers    Pulmonary nodule 8/2/2015    CT chest: 2.8 cm focal airspace disease RUL, pna vs mass, referred to pulm with PET    Type 2 diabetes mellitus without complication, without long-term current use of insulin ([address] 9/12/2015        SocHx   I personally reviewed.   Social History     Socioeconomic History Marital status:      Spouse name: Not on file    Number of children: Not on file    Years of education: Not on file    Highest education level: Not on file   Occupational History    Not on file   Tobacco Use    Smoking status: Former     Packs/day: 0.25     Years: 30.00     Pack years: 7.50     Types: Cigarettes     Quit date: 10/16/2016     Years since quittin.1    Smokeless tobacco: Never   Vaping Use    Vaping Use: Never used   Substance and Sexual Activity    Alcohol use: Yes     Alcohol/week: 0.0 standard drinks     Comment: very rare    Drug use: No    Sexual activity: Not on file   Other Topics Concern    Not on file   Social History Narrative    Not on file     Social Determinants of Health     Financial Resource Strain: Not on file   Food Insecurity: Not on file   Transportation Needs: Not on file   Physical Activity: Not on file   Stress: Not on file   Social Connections: Not on file   Intimate Partner Violence: Not on file   Housing Stability: Not on file        Allergies  I personally reviewed. No Known Allergies     Medications  I personally reviewed. Current Outpatient Medications on File Prior to Visit   Medication Sig Dispense Refill    metFORMIN (GLUCOPHAGE) 1,000 mg tablet TAKE 1 TABLET BY MOUTH TWICE A DAY WITH MEALS 180 Tablet 4    losartan-hydroCHLOROthiazide (HYZAAR) 100-12.5 mg per tablet TAKE ONE TABLET BY MOUTH DAILY 90 Tablet 1    atorvastatin (LIPITOR) 80 mg tablet TAKE ONE TABLET BY MOUTH DAILY 90 Tablet 1    glucose blood VI test strips (HealthPro Test Strips) strip USE ONE STRIP TO TEST DAILY 100 Strip 0    alcohol swabs (Alcohol Pads) padm Use to clean fingers before checking blood sugar 100 Pad 3    albuterol (PROVENTIL HFA, VENTOLIN HFA, PROAIR HFA) 90 mcg/actuation inhaler Take 2 Puffs by inhalation every four (4) hours as needed for Wheezing.  2 Each 5    Easy Touch Twist Lancets 30 gauge misc USE TO CHECK BLOOD SUGAR ONCE DAILY AS DIRECTED 100 Lancet 3    glipiZIDE (GLUCOTROL) 5 mg tablet TAKE ONE TABLET BY MOUTH DAILY 90 Tablet 4    glucosamine-chondroitin 250-200 mg tab Use as directed      lancets misc Use to check blood sugar once daily 100 Each 3    Blood-Glucose Meter (True Metrix Air Glucose Meter) monitoring kit Use to check blood glucose daily 1 Kit 0    acetaminophen (TYLENOL) 500 mg tablet Take  by mouth every six (6) hours as needed for Pain. No current facility-administered medications on file prior to visit. ROS: as listed in HPI        Objective:   Vitals  I personally reviewed. Visit Vitals  BP (!) 148/82 (BP 1 Location: Right arm, BP Patient Position: Sitting, BP Cuff Size: Large adult)   Pulse 95   Temp 98 °F (36.7 °C)   Resp 14   Ht 5' 3\" (1.6 m)   Wt 147 lb 6.4 oz (66.9 kg)   SpO2 98%   BMI 26.11 kg/m²        Physical Exam:   Physical Exam  Constitutional:       Appearance: Normal appearance. HENT:      Head: Normocephalic. Right Ear: External ear normal.      Left Ear: External ear normal.   Cardiovascular:      Rate and Rhythm: Normal rate and regular rhythm. Pulses: Normal pulses. Heart sounds: Normal heart sounds. Pulmonary:      Effort: Pulmonary effort is normal.      Breath sounds: Normal breath sounds. Abdominal:      General: Abdomen is flat. Palpations: Abdomen is soft. Tenderness: There is no abdominal tenderness. Musculoskeletal:      Cervical back: Normal range of motion. Neurological:      General: No focal deficit present. Mental Status: She is alert and oriented to person, place, and time. Assessment/Plan:     66-year-old female with past medical history of hypertension and type 2 diabetes presents as follow-up for hypertension right sided weakness, as well as new complaint of dysuria. Diagnoses and all orders for this visit:    1.  Right sided weakness  Patient presents today with daughter and endorsing history of right-sided weakness arm and leg present for the last 6 months or more. Noted on previous note with Dr. Julianna Ghotra. Patient present with daughter. Work up for stroke including CT and MRI at [facility] negative per patient's daughter, however no records available. Patient continues to have recurrent falls, has not hit head recently. Given continued weakness today in clinic on right side, will refer to neurology and pursue records from [facility]. Concern for possible old stroke. PT referral.   -     REFERRAL TO NEUROLOGY    2. Type 2 diabetes with nephropathy (HCC)  Currently managed with glipizide 5 mg and metformin 1000 mg daily. A1c Last at goal less than 8.0. We will recheck A1c today.   -     HEMOGLOBIN A1C WITH EAG; Future  -     METABOLIC PANEL, BASIC; Future    3. Dysuria    -     AMB POC URINALYSIS DIP STICK AUTO W/O MICRO  -     CULTURE, URINE; Future    4. Essential hypertension  Pressure elevated today in clinic patient needs to keep a log and follow-up in 2 weeks. -     METABOLIC PANEL, BASIC; Future         Follow-up and Dispositions    Return in about 2 weeks (around 12/23/2022) for Follow Up HTN. Pt was discussed with Dr She Patel (attending physician). I have reviewed patient medical and social history and medications. I have reviewed pertinent labs results and other data. I have discussed the diagnosis with the patient and the intended plan as seen in the above orders. The patient has received an after-visit summary and questions were answered concerning future plans. I have discussed medication side effects and warnings with the patient as well.     Julio Cesar Somers MD  Resident [facility] [place]

## 2022-12-10 LAB
ANION GAP SERPL CALC-SCNC: 6 MMOL/L (ref 5–15)
BUN SERPL-MCNC: 22 MG/DL (ref 6–20)
BUN/CREAT SERPL: 28 (ref 12–20)
CALCIUM SERPL-MCNC: 9.7 MG/DL (ref 8.5–10.1)
CHLORIDE SERPL-SCNC: 103 MMOL/L (ref 97–108)
CO2 SERPL-SCNC: 29 MMOL/L (ref 21–32)
CREAT SERPL-MCNC: 0.8 MG/DL (ref 0.55–1.02)
EST. AVERAGE GLUCOSE BLD GHB EST-MCNC: 151 MG/DL
GLUCOSE SERPL-MCNC: 148 MG/DL (ref 65–100)
HBA1C MFR BLD: 6.9 % (ref 4–5.6)
POTASSIUM SERPL-SCNC: 4 MMOL/L (ref 3.5–5.1)
SODIUM SERPL-SCNC: 138 MMOL/L (ref 136–145)

## 2022-12-12 LAB
BILIRUB UR QL STRIP: NEGATIVE
GLUCOSE UR-MCNC: NEGATIVE MG/DL
KETONES P FAST UR STRIP-MCNC: NEGATIVE MG/DL
PH UR STRIP: 5.5 [PH] (ref 4.6–8)
PROT UR QL STRIP: NEGATIVE
SP GR UR STRIP: 1.02 (ref 1–1.03)
UA UROBILINOGEN AMB POC: NORMAL (ref 0.2–1)
URINALYSIS CLARITY POC: NORMAL
URINALYSIS COLOR POC: YELLOW
URINE BLOOD POC: NORMAL
URINE LEUKOCYTES POC: NORMAL
URINE NITRITES POC: POSITIVE

## 2022-12-15 ENCOUNTER — TELEPHONE (OUTPATIENT)
Dept: FAMILY MEDICINE CLINIC | Age: 72
End: 2022-12-15

## 2022-12-15 DIAGNOSIS — R30.0 DYSURIA: Primary | ICD-10-CM

## 2022-12-15 LAB
BACTERIA SPEC CULT: ABNORMAL
CC UR VC: ABNORMAL
SERVICE CMNT-IMP: ABNORMAL

## 2022-12-15 RX ORDER — NITROFURANTOIN 25; 75 MG/1; MG/1
100 CAPSULE ORAL 2 TIMES DAILY
Qty: 10 CAPSULE | Refills: 0 | Status: SHIPPED | OUTPATIENT
Start: 2022-12-15 | End: 2022-12-20

## 2022-12-15 NOTE — PROGRESS NOTES
Urine culture showing pan susceptible E. Coli, sent Macrobid treatment. Patient contacted and informed. A1c 6.9, decreased from prior. BMP as expected with elevated glucose.

## 2023-01-20 DIAGNOSIS — E11.21 TYPE 2 DIABETES WITH NEPHROPATHY (HCC): ICD-10-CM

## 2023-01-26 RX ORDER — PEN NEEDLE, DIABETIC 31 GX3/16"
NEEDLE, DISPOSABLE MISCELLANEOUS
Qty: 100 STRIP | Refills: 3 | Status: SHIPPED | OUTPATIENT
Start: 2023-01-26

## 2023-02-08 ENCOUNTER — TELEPHONE (OUTPATIENT)
Dept: FAMILY MEDICINE CLINIC | Age: 73
End: 2023-02-08

## 2023-02-08 NOTE — TELEPHONE ENCOUNTER
Left message for patient to schedule mammogram ordered by Leticia Malcolm MD on 03/10/22. Requested patient to return call to panel manager at [phone] to schedule mammogram or notify that mammogram   has been completed at an outside facility.

## 2023-03-02 ENCOUNTER — OFFICE VISIT (OUTPATIENT)
Dept: FAMILY MEDICINE CLINIC | Age: 73
End: 2023-03-02

## 2023-03-02 VITALS
OXYGEN SATURATION: 95 % | HEIGHT: 63 IN | BODY MASS INDEX: 25.34 KG/M2 | SYSTOLIC BLOOD PRESSURE: 154 MMHG | DIASTOLIC BLOOD PRESSURE: 96 MMHG | TEMPERATURE: 97.3 F | HEART RATE: 81 BPM | RESPIRATION RATE: 17 BRPM | WEIGHT: 143 LBS

## 2023-03-02 DIAGNOSIS — N89.8 VAGINAL DISCHARGE: Primary | ICD-10-CM

## 2023-03-02 DIAGNOSIS — R53.81 DEBILITY: ICD-10-CM

## 2023-03-02 DIAGNOSIS — R30.0 DYSURIA: ICD-10-CM

## 2023-03-02 DIAGNOSIS — N89.8 VAGINAL DISCHARGE: ICD-10-CM

## 2023-03-02 DIAGNOSIS — R53.1 RIGHT SIDED WEAKNESS: ICD-10-CM

## 2023-03-02 DIAGNOSIS — N30.00 ACUTE CYSTITIS WITHOUT HEMATURIA: ICD-10-CM

## 2023-03-02 LAB
BILIRUB UR QL STRIP: NEGATIVE
GLUCOSE UR-MCNC: NEGATIVE MG/DL
KETONES P FAST UR STRIP-MCNC: NORMAL MG/DL
PH UR STRIP: 5 [PH] (ref 4.6–8)
PROT UR QL STRIP: NORMAL
SP GR UR STRIP: 1.01 (ref 1–1.03)
UA UROBILINOGEN AMB POC: NORMAL (ref 0.2–1)
URINALYSIS CLARITY POC: NORMAL
URINALYSIS COLOR POC: YELLOW
URINE BLOOD POC: NORMAL
URINE LEUKOCYTES POC: NORMAL
URINE NITRITES POC: POSITIVE
WET MOUNT POCT, WMPOCT: NORMAL

## 2023-03-02 RX ORDER — SULFAMETHOXAZOLE AND TRIMETHOPRIM 800; 160 MG/1; MG/1
1 TABLET ORAL 2 TIMES DAILY
Qty: 20 TABLET | Refills: 0 | Status: SHIPPED | OUTPATIENT
Start: 2023-03-02 | End: 2023-03-12

## 2023-03-02 NOTE — PROGRESS NOTES
Chief Complaint   Patient presents with    Results     lab    Vaginal Discharge    Gait Problem     Wants PT at home      Shoulder Pain     right     1. Have you been to the ER, urgent care clinic since your last visit? Hospitalized since your last visit? No    2. Have you seen or consulted any other health care providers outside of the [address] [place] [address] since your last visit? Include any pap smears or colon screening.  No

## 2023-03-02 NOTE — PROGRESS NOTES
[facility] [address] [address]   Office [phone], Fax [phone]    Subjective:     Chief Complaint   Patient presents with    Results     lab    Vaginal Discharge    Gait Problem     Wants PT at home      Shoulder Pain     right       HPI:  Ranjith Bernard is a 67 y.o. female with a history of T2DM, HTN, concern for recent stroke that presents for: Vaginal Discharge     Vaginal Discharge:  - Previously seen for dysuria in December, culture showing pan susceptible E. Coli and treated with Macrobid at that time. Since that time patient states symptoms of brownish-green discharge have not changed. Patient with concern for poor hygiene, possibly suffered a stroke in the last year. Is working on a follow-up with neurology. Denies any bleeding, also any fevers or chills. Patient's daughter has tried to help her multiple times with bathroom hygiene however patient has refused. Patient admits to wiping improperly occasionally. Daughter reports patient often refuses to bathe. Right Sided Weakness:   - Has been present for approx 9 months. When patient was seen in December was referred to neurology, however patient has not reached out and is also not heard anything from neurology. Discussed the importance of following up with daughter. Health Maintenance:  Health Maintenance Due   Topic Date Due    Eye Exam Retinal or Dilated  Never done    DTaP/Tdap/Td series (1 - Tdap) Never done    Shingles Vaccine (1 of 2) Never done    Bone Densitometry (Dexa) Screening  Never done    Medicare Yearly Exam  10/01/2020    Breast Cancer Screen Mammogram  10/22/2020    COVID-19 Vaccine (3 - Booster for Pfizer series) 06/22/2021    Diabetic Alb to Cr ratio (uACR) test  04/13/2022    Flu Vaccine (1) Never done    Foot Exam Q1  09/20/2022    Lipid Screen  03/15/2023          Past Medical Hx  I personally reviewed.   Past Medical History:   Diagnosis Date    Diverticulosis 9/23/2015    Hard of hearing Hypertension     Iron deficiency anemia     baseline 9.7 on 2015    PUD (peptic ulcer disease)     hospitalized at [facility], EGD showed a nonbleeding esophageal ulcer, multiple gastric ulcers    Pulmonary nodule 2015    CT chest: 2.8 cm focal airspace disease RUL, pna vs mass, referred to pulm with PET    Type 2 diabetes mellitus without complication, without long-term current use of insulin ([facility] [address] 2015        SocHx   I personally reviewed. Social History     Socioeconomic History    Marital status:      Spouse name: Not on file    Number of children: Not on file    Years of education: Not on file    Highest education level: Not on file   Occupational History    Not on file   Tobacco Use    Smoking status: Former     Packs/day: 0.25     Years: 30.00     Pack years: 7.50     Types: Cigarettes     Quit date: 10/16/2016     Years since quittin.3    Smokeless tobacco: Never   Vaping Use    Vaping Use: Never used   Substance and Sexual Activity    Alcohol use: Yes     Alcohol/week: 0.0 standard drinks     Comment: very rare    Drug use: No    Sexual activity: Not on file   Other Topics Concern    Not on file   Social History Narrative    Not on file     Social Determinants of Health     Financial Resource Strain: Not on file   Food Insecurity: Not on file   Transportation Needs: Not on file   Physical Activity: Not on file   Stress: Not on file   Social Connections: Not on file   Intimate Partner Violence: Not on file   Housing Stability: Not on file        Allergies  I personally reviewed. No Known Allergies     Medications  I personally reviewed.   Current Outpatient Medications on File Prior to Visit   Medication Sig Dispense Refill    metFORMIN (GLUCOPHAGE) 1,000 mg tablet TAKE 1 TABLET BY MOUTH TWICE A DAY WITH MEALS 180 Tablet 4    losartan-hydroCHLOROthiazide (HYZAAR) 100-12.5 mg per tablet TAKE ONE TABLET BY MOUTH DAILY 90 Tablet 1    atorvastatin (LIPITOR) 80 mg tablet TAKE ONE TABLET BY MOUTH DAILY 90 Tablet 1    albuterol (PROVENTIL HFA, VENTOLIN HFA, PROAIR HFA) 90 mcg/actuation inhaler Take 2 Puffs by inhalation every four (4) hours as needed for Wheezing. 2 Each 5    glipiZIDE (GLUCOTROL) 5 mg tablet TAKE ONE TABLET BY MOUTH DAILY 90 Tablet 4    glucose blood VI test strips (HealthPro Test Strips) strip USE ONE STRIP TO TEST DAILY (Patient not taking: Reported on 3/2/2023) 100 Strip 3    alcohol swabs (Alcohol Pads) padm Use to clean fingers before checking blood sugar (Patient not taking: Reported on 3/2/2023) 100 Pad 3    Easy Touch Twist Lancets 30 gauge misc USE TO CHECK BLOOD SUGAR ONCE DAILY AS DIRECTED (Patient not taking: Reported on 3/2/2023) 100 Lancet 3    glucosamine-chondroitin 250-200 mg tab Use as directed (Patient not taking: Reported on 3/2/2023)      lancets misc Use to check blood sugar once daily (Patient not taking: Reported on 3/2/2023) 100 Each 3    Blood-Glucose Meter (True Metrix Air Glucose Meter) monitoring kit Use to check blood glucose daily (Patient not taking: Reported on 3/2/2023) 1 Kit 0    acetaminophen (TYLENOL) 500 mg tablet Take  by mouth every six (6) hours as needed for Pain. (Patient not taking: Reported on 3/2/2023)       No current facility-administered medications on file prior to visit. ROS:    As listed in HPI      Objective:   Vitals  I personally reviewed. Visit Vitals  BP (!) 154/96   Pulse 81   Temp 97.3 °F (36.3 °C) (Temporal)   Resp 17   Ht 5' 3\" (1.6 m)   Wt 143 lb (64.9 kg)   SpO2 95%   BMI 25.33 kg/m²        Physical Exam:   Physical Exam  Constitutional:       Appearance: Normal appearance. Cardiovascular:      Rate and Rhythm: Normal rate and regular rhythm. Pulses: Normal pulses. Heart sounds: Normal heart sounds. Pulmonary:      Effort: Pulmonary effort is normal. No respiratory distress. Breath sounds: Normal breath sounds. No wheezing or rhonchi. Genitourinary:     General: Normal vulva.       Comments: Speculum exam performed for wet prep analysis. Thin green-brown material present from the introitus to the posterior aspect of the vault. No blood noted. No erythema. Neurological:      General: No focal deficit present. Mental Status: She is alert and oriented to person, place, and time. Notable Labs and Imaging:   UA dipstick - yellow and cloudy, 1+ blood, 2+ leuks, pos nitrites,   Wet prep - dark material concerning for stool, bacteria, no yeast, many epi. No RBCs    Assessment/Plan:   Assessment:   70-year-old female with concern for recent stroke. Patient was treated for UTI in DecemberPatient was treated for UTI in December with [facility], however noting recurrent symptoms very soon after. Patient has self-reported very poor hygiene, and daughter also endorses patient will not accept help despite her right-sided weakness, as well as resistance to bathing. Vital stable, speculum exam notable for greenish-brown scant discharge throughout vagina. Labs notable for dirty UA as well as wet prep concerning for presence of stool and bacteria, no yeast or red blood cells. Suspect poor hygiene resulting in fecal inoculation of vaginal canal.  Will refer for home with assistance. Patient also instructed to follow-up with neurology, and will treat current UTI with Bactrim. 1. Vaginal discharge  - AMB POC URINALYSIS DIP STICK AUTO W/O MICRO  - CULTURE, URINE; Future  - AMB POC SMEAR, STAIN & INTERPRET, WET MOUNT    2. Dysuria  - AMB POC URINALYSIS DIP STICK AUTO W/O MICRO  - CULTURE, URINE; Future  - AMB POC SMEAR, STAIN & INTERPRET, WET MOUNT  - trimethoprim-sulfamethoxazole (BACTRIM DS, SEPTRA DS) 160-800 mg per tablet; Take 1 Tablet by mouth two (2) times a day for 10 days. Dispense: 20 Tablet; Refill: 0    3. Debility  - REFERRAL TO HOME HEALTH    4. Acute cystitis without hematuria  - trimethoprim-sulfamethoxazole (BACTRIM DS, SEPTRA DS) 160-800 mg per tablet;  Take 1 Tablet by mouth two (2) times a day for 10 days. Dispense: 20 Tablet; Refill: 0    5. Right sided weakness  - REFERRAL TO NEUROLOGY    Follow-up and Dispositions    Return in about 2 weeks (around 3/16/2023) for Follow Up UTI resolution,Home Health discussion . Pt was discussed with Dr Alana Strickland (attending physician). I have reviewed patient medical and social history and medications. I have reviewed pertinent labs results and other data. I have discussed the diagnosis with the patient and the intended plan as seen in the above orders. The patient has received an after-visit summary and questions were answered concerning future plans. I have discussed medication side effects and warnings with the patient as well.     Naida Rodriguez MD  Resident [place] [address]

## 2023-03-05 LAB
BACTERIA SPEC CULT: ABNORMAL
BACTERIA SPEC CULT: ABNORMAL
CC UR VC: ABNORMAL
SERVICE CMNT-IMP: ABNORMAL

## 2023-03-16 ENCOUNTER — TELEPHONE (OUTPATIENT)
Dept: FAMILY MEDICINE CLINIC | Age: 73
End: 2023-03-16

## 2023-03-16 NOTE — TELEPHONE ENCOUNTER
Pt's daughter stated that the neurologist you referred pt to does not have any appointments until June. She asked if you could refer the pt to someone with sooner appts. Her contact number is [phone]. Thank you.

## 2023-04-07 ENCOUNTER — APPOINTMENT (OUTPATIENT)
Dept: CT IMAGING | Age: 73
End: 2023-04-07
Attending: EMERGENCY MEDICINE
Payer: MEDICARE

## 2023-04-07 ENCOUNTER — HOSPITAL ENCOUNTER (OUTPATIENT)
Age: 73
End: 2023-04-07
Attending: STUDENT IN AN ORGANIZED HEALTH CARE EDUCATION/TRAINING PROGRAM
Payer: MEDICARE

## 2023-04-07 ENCOUNTER — APPOINTMENT (OUTPATIENT)
Dept: CT IMAGING | Age: 73
End: 2023-04-07
Attending: STUDENT IN AN ORGANIZED HEALTH CARE EDUCATION/TRAINING PROGRAM
Payer: MEDICARE

## 2023-04-07 ENCOUNTER — OFFICE VISIT (OUTPATIENT)
Dept: FAMILY MEDICINE CLINIC | Age: 73
End: 2023-04-07

## 2023-07-10 RX ORDER — LOSARTAN POTASSIUM AND HYDROCHLOROTHIAZIDE 12.5; 1 MG/1; MG/1
TABLET ORAL
Qty: 90 TABLET | Refills: 1 | Status: SHIPPED | OUTPATIENT
Start: 2023-07-10

## 2023-07-10 RX ORDER — ATORVASTATIN CALCIUM 80 MG/1
TABLET, FILM COATED ORAL
Qty: 90 TABLET | Refills: 1 | Status: SHIPPED | OUTPATIENT
Start: 2023-07-10

## 2023-07-21 RX ORDER — SULFAMETHOXAZOLE AND TRIMETHOPRIM 800; 160 MG/1; MG/1
TABLET ORAL
Qty: 20 TABLET | OUTPATIENT
Start: 2023-07-21

## 2023-09-29 RX ORDER — AMLODIPINE BESYLATE 10 MG/1
10 TABLET ORAL DAILY
Qty: 90 TABLET | Refills: 0 | Status: SHIPPED | OUTPATIENT
Start: 2023-09-29

## 2023-12-01 NOTE — TELEPHONE ENCOUNTER
Called and talked to patient and daughter and they are aware she has to come in to get more refills.

## 2023-12-01 NOTE — TELEPHONE ENCOUNTER
Please call patient to schedule follow up appointment for chronic conditions with labs, will need this prior to any additional refills. Thanks!   Rell Skinner MD

## 2023-12-29 RX ORDER — AMLODIPINE BESYLATE 10 MG/1
10 TABLET ORAL DAILY
Qty: 90 TABLET | Refills: 3 | Status: SHIPPED | OUTPATIENT
Start: 2023-12-29

## 2023-12-31 LAB
AVERAGE GLUCOSE: NORMAL
HBA1C MFR BLD: 7.3 %

## 2024-01-08 ENCOUNTER — TELEPHONE (OUTPATIENT)
Age: 74
End: 2024-01-08

## 2024-01-08 NOTE — TELEPHONE ENCOUNTER
----- Message from Consuelo Hoskins MA sent at 1/5/2024  9:13 AM EST -----  Subject: Message to Provider    QUESTIONS  Information for Provider? Jose from [facility]. Will Dr Bennett sign   home health orders?  ---------------------------------------------------------------------------  --------------  CALL BACK INFO  [phone]; OK to leave message on voicemail  ---------------------------------------------------------------------------  --------------  SCRIPT ANSWERS  Relationship to Patient? Covered Entity  Covered Entity Type? Home Health Care?  Representative Name? jose [facility]

## 2024-01-09 NOTE — TELEPHONE ENCOUNTER
Patient last seen in April.  Will need face-to-face encounter within 30 days of starting HH services.   Please call to schedule with patient and notify HH that I will follow Ms. Gorman's care with them.

## 2024-04-10 ENCOUNTER — OFFICE VISIT (OUTPATIENT)
Age: 74
End: 2024-04-10

## 2024-04-10 VITALS
BODY MASS INDEX: 24.09 KG/M2 | SYSTOLIC BLOOD PRESSURE: 173 MMHG | RESPIRATION RATE: 14 BRPM | HEART RATE: 79 BPM | DIASTOLIC BLOOD PRESSURE: 73 MMHG | WEIGHT: 144.6 LBS | HEIGHT: 65 IN | OXYGEN SATURATION: 94 %

## 2024-04-10 DIAGNOSIS — I10 ESSENTIAL (PRIMARY) HYPERTENSION: Primary | ICD-10-CM

## 2024-04-10 DIAGNOSIS — E78.5 HYPERLIPIDEMIA, UNSPECIFIED HYPERLIPIDEMIA TYPE: ICD-10-CM

## 2024-04-10 DIAGNOSIS — E11.21 TYPE 2 DIABETES MELLITUS WITH DIABETIC NEPHROPATHY, WITHOUT LONG-TERM CURRENT USE OF INSULIN (HCC): ICD-10-CM

## 2024-04-10 DIAGNOSIS — I49.9 IRREGULAR HEART RATE: ICD-10-CM

## 2024-04-10 LAB
ALBUMIN SERPL-MCNC: 4 G/DL (ref 3.5–5)
ALBUMIN/GLOB SERPL: 1.3 (ref 1.1–2.2)
ALP SERPL-CCNC: 95 U/L (ref 45–117)
ALT SERPL-CCNC: 24 U/L (ref 12–78)
ANION GAP SERPL CALC-SCNC: 7 MMOL/L (ref 5–15)
AST SERPL-CCNC: 13 U/L (ref 15–37)
BILIRUB SERPL-MCNC: 0.5 MG/DL (ref 0.2–1)
BUN SERPL-MCNC: 18 MG/DL (ref 6–20)
BUN/CREAT SERPL: 24 (ref 12–20)
CALCIUM SERPL-MCNC: 10.1 MG/DL (ref 8.5–10.1)
CHLORIDE SERPL-SCNC: 101 MMOL/L (ref 97–108)
CHOLEST SERPL-MCNC: 156 MG/DL
CO2 SERPL-SCNC: 28 MMOL/L (ref 21–32)
CREAT SERPL-MCNC: 0.75 MG/DL (ref 0.55–1.02)
CREAT UR-MCNC: 26.4 MG/DL
ERYTHROCYTE [DISTWIDTH] IN BLOOD BY AUTOMATED COUNT: 13 % (ref 11.5–14.5)
EST. AVERAGE GLUCOSE BLD GHB EST-MCNC: 189 MG/DL
GLOBULIN SER CALC-MCNC: 3 G/DL (ref 2–4)
GLUCOSE SERPL-MCNC: 225 MG/DL (ref 65–100)
HBA1C MFR BLD: 8.2 % (ref 4–5.6)
HCT VFR BLD AUTO: 38.7 % (ref 35–47)
HDLC SERPL-MCNC: 41 MG/DL
HDLC SERPL: 3.8 (ref 0–5)
HGB BLD-MCNC: 12.3 G/DL (ref 11.5–16)
LDLC SERPL CALC-MCNC: 76 MG/DL (ref 0–100)
MCH RBC QN AUTO: 29.8 PG (ref 26–34)
MCHC RBC AUTO-ENTMCNC: 31.8 G/DL (ref 30–36.5)
MCV RBC AUTO: 93.7 FL (ref 80–99)
MICROALBUMIN UR-MCNC: 2.17 MG/DL
MICROALBUMIN/CREAT UR-RTO: 82 MG/G (ref 0–30)
NRBC # BLD: 0 K/UL (ref 0–0.01)
NRBC BLD-RTO: 0 PER 100 WBC
PLATELET # BLD AUTO: 254 K/UL (ref 150–400)
PMV BLD AUTO: 11.2 FL (ref 8.9–12.9)
POTASSIUM SERPL-SCNC: 3.8 MMOL/L (ref 3.5–5.1)
PROT SERPL-MCNC: 7 G/DL (ref 6.4–8.2)
RBC # BLD AUTO: 4.13 M/UL (ref 3.8–5.2)
SODIUM SERPL-SCNC: 136 MMOL/L (ref 136–145)
TRIGL SERPL-MCNC: 195 MG/DL
VLDLC SERPL CALC-MCNC: 39 MG/DL
WBC # BLD AUTO: 7.3 K/UL (ref 3.6–11)

## 2024-04-10 PROCEDURE — 1123F ACP DISCUSS/DSCN MKR DOCD: CPT | Performed by: STUDENT IN AN ORGANIZED HEALTH CARE EDUCATION/TRAINING PROGRAM

## 2024-04-10 PROCEDURE — 3078F DIAST BP <80 MM HG: CPT | Performed by: STUDENT IN AN ORGANIZED HEALTH CARE EDUCATION/TRAINING PROGRAM

## 2024-04-10 PROCEDURE — 3077F SYST BP >= 140 MM HG: CPT | Performed by: STUDENT IN AN ORGANIZED HEALTH CARE EDUCATION/TRAINING PROGRAM

## 2024-04-10 PROCEDURE — 99214 OFFICE O/P EST MOD 30 MIN: CPT | Performed by: STUDENT IN AN ORGANIZED HEALTH CARE EDUCATION/TRAINING PROGRAM

## 2024-04-10 RX ORDER — ALBUTEROL SULFATE 90 UG/1
2 AEROSOL, METERED RESPIRATORY (INHALATION) EVERY 4 HOURS PRN
Qty: 18 G | Refills: 1 | Status: SHIPPED | OUTPATIENT
Start: 2024-04-10

## 2024-04-10 RX ORDER — ASPIRIN 81 MG/1
81 TABLET ORAL DAILY
Qty: 90 TABLET | Refills: 1 | Status: SHIPPED | OUTPATIENT
Start: 2024-04-10

## 2024-04-10 RX ORDER — HYDRALAZINE HYDROCHLORIDE 50 MG/1
50 TABLET, FILM COATED ORAL EVERY 12 HOURS
COMMUNITY
End: 2024-04-10 | Stop reason: SDUPTHER

## 2024-04-10 RX ORDER — ASPIRIN 81 MG/1
81 TABLET ORAL DAILY
COMMUNITY
End: 2024-04-10 | Stop reason: SDUPTHER

## 2024-04-10 RX ORDER — ATORVASTATIN CALCIUM 80 MG/1
80 TABLET, FILM COATED ORAL DAILY
Qty: 90 TABLET | Refills: 1 | Status: SHIPPED | OUTPATIENT
Start: 2024-04-10

## 2024-04-10 RX ORDER — HYDRALAZINE HYDROCHLORIDE 50 MG/1
50 TABLET, FILM COATED ORAL EVERY 12 HOURS
Qty: 90 TABLET | Refills: 1 | Status: SHIPPED | OUTPATIENT
Start: 2024-04-10

## 2024-04-10 SDOH — ECONOMIC STABILITY: FOOD INSECURITY: WITHIN THE PAST 12 MONTHS, THE FOOD YOU BOUGHT JUST DIDN'T LAST AND YOU DIDN'T HAVE MONEY TO GET MORE.: PATIENT DECLINED

## 2024-04-10 SDOH — ECONOMIC STABILITY: FOOD INSECURITY: WITHIN THE PAST 12 MONTHS, YOU WORRIED THAT YOUR FOOD WOULD RUN OUT BEFORE YOU GOT MONEY TO BUY MORE.: PATIENT DECLINED

## 2024-04-10 SDOH — ECONOMIC STABILITY: HOUSING INSECURITY
IN THE LAST 12 MONTHS, WAS THERE A TIME WHEN YOU DID NOT HAVE A STEADY PLACE TO SLEEP OR SLEPT IN A SHELTER (INCLUDING NOW)?: PATIENT DECLINED

## 2024-04-10 SDOH — ECONOMIC STABILITY: INCOME INSECURITY: HOW HARD IS IT FOR YOU TO PAY FOR THE VERY BASICS LIKE FOOD, HOUSING, MEDICAL CARE, AND HEATING?: PATIENT DECLINED

## 2024-04-10 ASSESSMENT — PATIENT HEALTH QUESTIONNAIRE - PHQ9
SUM OF ALL RESPONSES TO PHQ QUESTIONS 1-9: 0
2. FEELING DOWN, DEPRESSED OR HOPELESS: NOT AT ALL
1. LITTLE INTEREST OR PLEASURE IN DOING THINGS: NOT AT ALL
SUM OF ALL RESPONSES TO PHQ QUESTIONS 1-9: 0
SUM OF ALL RESPONSES TO PHQ9 QUESTIONS 1 & 2: 0

## 2024-04-10 NOTE — PROGRESS NOTES
Claudia Gorman is a 73 y.o. female    Chief Complaint   Patient presents with    Medication Refill       \"Have you been to the ER, urgent care clinic since your last visit?  Hospitalized since your last visit?\"    no    “Have you seen or consulted any other health care providers outside of [facility] since your last visit?”    NO       Have you had a mammogram?”   NO    Date of last Mammogram: 10/22/2018            Vitals:    04/10/24 0851   BP: (!) 173/73   Pulse:    Resp:    SpO2:           No data to display              Health Maintenance Due   Topic Date Due    Diabetic retinal exam  Never done    DTaP/Tdap/Td vaccine (1 - Tdap) Never done    Shingles vaccine (1 of 2) Never done    DEXA (modify frequency per FRAX score)  Never done    Respiratory Syncytial Virus (RSV) Pregnant or age 60 yrs+ (1 - 1-dose 60+ series) Never done    Breast cancer screen  10/22/2020    Pneumococcal 65+ years Vaccine (2 of 2 - PCV) 12/15/2020    Diabetic Alb to Cr ratio (uACR) test  04/13/2022    Diabetic foot exam  09/20/2022    Lipids  03/15/2023    COVID-19 Vaccine (3 - 2023-24 season) 09/01/2023    Depression Screen  03/02/2024    GFR test (Diabetes, CKD 3-4, OR last GFR 15-59)  04/07/2024       
I saw and evaluated the patient, performing the key elements of the service.  I discussed the findings, assessment and plan with the resident and agree with the resident's findings and plan as documented in the resident's note.    
unspecified hyperlipidemia type: Last LDL 37.4 on 3/15/2022, which was at goal (less than 55 given history of TIA/CVA).  Provided refill of atorvastatin 80 mg daily as per above.  Recommended rechecking serum transaminase levels and lipid panel.  -     Comprehensive Metabolic Panel; Future  -     Lipid Panel; Future  -     atorvastatin (LIPITOR) 80 MG tablet; Take 1 tablet by mouth daily  -     aspirin 81 MG EC tablet; Take 1 tablet by mouth daily    Irregular heart rate: Occasional premature beat demonstrated on auscultation.  Prior EKG with PVCs.  Patient denies chest pain or palpitations.  Denies history of A-fib.  Suspect PVCs or etiology.  Recommended EKG to rule out atrial fibrillation.  Patient declined, stating \"that is not what I came for.\"  Also discussed option of doing external cardiac monitor for 14 days.  Patient declined.  She understands that she can return at any time to receive EKG or order can be placed for heart monitor.  -     AMB POC EKG ROUTINE    Other orders: Provided refill of albuterol as needed.  No respiratory complaints at this time.  -     albuterol sulfate HFA (PROVENTIL;VENTOLIN;PROAIR) 108 (90 Base) MCG/ACT inhaler; Inhale 2 puffs into the lungs every 4 hours as needed for Wheezing or Shortness of Breath        Return in about 2 weeks (around 4/24/2024).    Claudia Gorman expressed understanding of this plan. An AVS was printed and given to the patient.     Pt discussed with Dr. Fox (Attending Physician),    Manish Childers MD  Family Medicine Resident    Please note that this dictation was completed with [employer], the [employer] voice recognition software.  Quite often unanticipated grammatical, syntax, homophones, and other interpretive errors are inadvertently transcribed by the computer software. Please disregard these errors.  Please excuse any errors that have escaped final proofreading.

## 2024-04-14 ENCOUNTER — TELEPHONE (OUTPATIENT)
Age: 74
End: 2024-04-14

## 2024-04-14 ENCOUNTER — TELEPHONE (OUTPATIENT)
Facility: CLINIC | Age: 74
End: 2024-04-14

## 2024-04-22 ENCOUNTER — OFFICE VISIT (OUTPATIENT)
Age: 74
End: 2024-04-22
Payer: MEDICARE

## 2024-04-22 ENCOUNTER — ANCILLARY PROCEDURE (OUTPATIENT)
Age: 74
End: 2024-04-22
Payer: MEDICARE

## 2024-04-22 ENCOUNTER — TELEPHONE (OUTPATIENT)
Age: 74
End: 2024-04-22

## 2024-04-22 VITALS
OXYGEN SATURATION: 95 % | HEIGHT: 65 IN | WEIGHT: 139.2 LBS | BODY MASS INDEX: 23.19 KG/M2 | DIASTOLIC BLOOD PRESSURE: 81 MMHG | SYSTOLIC BLOOD PRESSURE: 146 MMHG | HEART RATE: 89 BPM | RESPIRATION RATE: 18 BRPM | TEMPERATURE: 97.8 F

## 2024-04-22 DIAGNOSIS — E11.21 TYPE 2 DIABETES MELLITUS WITH DIABETIC NEPHROPATHY, WITHOUT LONG-TERM CURRENT USE OF INSULIN (HCC): ICD-10-CM

## 2024-04-22 DIAGNOSIS — R51.9 DAILY HEADACHE: ICD-10-CM

## 2024-04-22 DIAGNOSIS — G89.29 CHRONIC PAIN OF BOTH SHOULDERS: ICD-10-CM

## 2024-04-22 DIAGNOSIS — M25.511 CHRONIC PAIN OF BOTH SHOULDERS: ICD-10-CM

## 2024-04-22 DIAGNOSIS — S99.911A RIGHT ANKLE INJURY, INITIAL ENCOUNTER: ICD-10-CM

## 2024-04-22 DIAGNOSIS — I10 ESSENTIAL (PRIMARY) HYPERTENSION: ICD-10-CM

## 2024-04-22 DIAGNOSIS — M25.512 CHRONIC PAIN OF BOTH SHOULDERS: ICD-10-CM

## 2024-04-22 DIAGNOSIS — G25.81 RESTLESS LEG: Primary | ICD-10-CM

## 2024-04-22 DIAGNOSIS — K90.89 OTHER INTESTINAL MALABSORPTION: ICD-10-CM

## 2024-04-22 DIAGNOSIS — M75.01 ADHESIVE CAPSULITIS OF RIGHT SHOULDER: ICD-10-CM

## 2024-04-22 PROCEDURE — 3077F SYST BP >= 140 MM HG: CPT | Performed by: FAMILY MEDICINE

## 2024-04-22 PROCEDURE — 1123F ACP DISCUSS/DSCN MKR DOCD: CPT | Performed by: FAMILY MEDICINE

## 2024-04-22 PROCEDURE — 73600 X-RAY EXAM OF ANKLE: CPT

## 2024-04-22 PROCEDURE — 99214 OFFICE O/P EST MOD 30 MIN: CPT | Performed by: FAMILY MEDICINE

## 2024-04-22 PROCEDURE — 3052F HG A1C>EQUAL 8.0%<EQUAL 9.0%: CPT | Performed by: FAMILY MEDICINE

## 2024-04-22 PROCEDURE — G2211 COMPLEX E/M VISIT ADD ON: HCPCS | Performed by: FAMILY MEDICINE

## 2024-04-22 PROCEDURE — 3079F DIAST BP 80-89 MM HG: CPT | Performed by: FAMILY MEDICINE

## 2024-04-22 NOTE — TELEPHONE ENCOUNTER
Attempted to each patient regarding X-ray result.  Left voicemail to call back.    X-ray demonstrates a non-displaced fracture of the right lateral ankle.  I recommend getting placed in a walking boot orthotic for the first 4-5 weeks after your fracture.  You may remove the boot for sleeping, bathing, and sitting, but the boot should be on any time you are standing or walking.  We should see you back in 4-5 weeks to assess if you may begin walking without the boot.     Addendum:   Attempted to reach patient and emergency contact today.  Left messages at both numbers.  Advised to call office.  Will need to get placed in walking boot.

## 2024-04-24 NOTE — TELEPHONE ENCOUNTER
Called and spoke with patient's daughter, Aileen, who confirmed patient name and . Daughter is also listed on ALBINO form from 2021.    Daughter confirmed that they have received Provider's messages regarding Xray results and were planning on bring patient into office today (24), but patient was not feeling well because of the rain. Daughter stated that they are planning on bring patient into the office this week for boot fitting. This nurse advised daughter that it is ok to walk-in for boot fitting, but to have patient minimize putting pressure on ankle until boot fitting.    Daughter agreed and voiced understanding to advice provided.    Follow up appointment scheduled with PCP on 24 at 10:20 AM.

## 2024-04-25 ENCOUNTER — NURSE ONLY (OUTPATIENT)
Age: 74
End: 2024-04-25

## 2024-04-25 DIAGNOSIS — S82.64XD CLOSED NONDISP FX OF RIGHT LATERAL MALLEOLUS WITH ROUTINE HEALING: Primary | ICD-10-CM

## 2024-04-25 NOTE — PROGRESS NOTES
Patient came in to get placed in walking boot.  Unable to walk with boot due to weakness.  Declined using boot.  Discussed alternative w/ cast, however this would likely have same concern for weight.  Patient declined cast.  Discussed bracing with Air-stirrup cast may be alternative, but would be at higher risk of non-union, displacement and need for surgical repair.  Also discussed this was not standard of care.  She notes that she understands and accepts these risks.  Will place referral for home health PT.

## 2024-05-07 NOTE — PROGRESS NOTES
Claudia Gorman  73 y.o. female  1950  [address] [facility] [address]  [id number]   [facility] [place]  Damien Bennett MD       Encounter Date: 4/22/2024    Chief Complaint   Patient presents with    Follow-up Chronic Condition    Migraine     Patient has been waking up with migraines. She has been taking Tylenol. Not helping very much.     Extremity Weakness     Right leg has been tingling.      History of Present Illness   Claudia Gorman is a 73 y.o. female who presents to clinic today for follow-up on multiple conditions.    Recently had lab work done by Dr. Childers.  A1c and urine microalbumin were elevated.  Reviewed Dr. Childers's recommendations.    Patient notes twisting ankle approximately 1 week ago has had edema and ankle pain since that time.  She has been ambulating with a walker.  Remains painful.    Migraine headaches: Has been finding that she wakes up with headaches in the morning (the headaches are not waking her up).  She has been taking Tylenol without significant benefit.    RLS: History of RLS.  Notes the right leg has been more tingly over the past several weeks to months.    Review of Systems   Review of Systems    Vitals/Objective:     Vitals:    04/22/24 1138   BP: (!) 146/81   Site: Right Upper Arm   Position: Sitting   Cuff Size: Medium Adult   Pulse: 89   Resp: 18   Temp: 97.8 °F (36.6 °C)   TempSrc: Temporal   SpO2: 95%   Weight: 63.1 kg (139 lb 3.2 oz)   Height: 1.651 m (5' 5\")     Body mass index is 23.16 kg/m².    Physical Exam  Constitutional:       Appearance: She is not ill-appearing.      Comments: Thin, elderly female ambulating with walker.  Speech slurred at baseline.  No significant change   HENT:      Head: Normocephalic and atraumatic.   Cardiovascular:      Rate and Rhythm: Normal rate and regular rhythm.      Pulses: Normal pulses.   Pulmonary:      Effort: Pulmonary effort is normal.      Breath sounds: Normal breath

## 2024-06-07 ENCOUNTER — TELEPHONE (OUTPATIENT)
Age: 74
End: 2024-06-07

## 2024-06-07 NOTE — TELEPHONE ENCOUNTER
Zackary with [facility] called to inform that the cannot accept the referral  written on 05/12/2024 due to not being in network with the pts insurance

## 2024-06-10 ENCOUNTER — TELEPHONE (OUTPATIENT)
Age: 74
End: 2024-06-10

## 2024-06-10 NOTE — TELEPHONE ENCOUNTER
----- Message from Jenn Sandra sent at 4/22/2024 12:59 PM EDT -----  Regarding: Donald Return in about 3 months (around 7/22/2024), or if symptoms worsen or fail to improve, for Follow-up on Chronic Conditions.  Dr. Bennett Return in about 3 months (around 7/22/2024), or if symptoms worsen or fail to improve, for Follow-up on Chronic Conditions.

## 2024-06-10 NOTE — TELEPHONE ENCOUNTER
Left  for pt to make her follow up [facility] in late July. Hasn't had [facility] in 2 years.

## 2024-06-11 ENCOUNTER — TELEPHONE (OUTPATIENT)
Age: 74
End: 2024-06-11

## 2024-06-11 DIAGNOSIS — S82.64XD CLOSED NONDISP FX OF RIGHT LATERAL MALLEOLUS WITH ROUTINE HEALING: Primary | ICD-10-CM

## 2024-06-11 NOTE — TELEPHONE ENCOUNTER
Darlene from [facility] stated that they have received home health order; however,  pt's insurance is not in network.

## 2024-06-19 ENCOUNTER — TELEPHONE (OUTPATIENT)
Age: 74
End: 2024-06-19

## 2024-06-19 NOTE — TELEPHONE ENCOUNTER
Called patient to inform, the Home Health Agency has been trying to contact her to schedule patient's start of care.  Wanted to began care on yesterday 06/18/2024.     Left a message. Providing the Home Health Agencies contact information as mentioned below:    [facility]  [phone]

## 2024-07-15 DIAGNOSIS — I10 ESSENTIAL (PRIMARY) HYPERTENSION: ICD-10-CM

## 2024-07-16 RX ORDER — HYDRALAZINE HYDROCHLORIDE 50 MG/1
TABLET, FILM COATED ORAL
Qty: 90 TABLET | Refills: 1 | Status: SHIPPED | OUTPATIENT
Start: 2024-07-16

## 2024-07-29 ENCOUNTER — TELEPHONE (OUTPATIENT)
Age: 74
End: 2024-07-29

## 2024-07-31 ENCOUNTER — TELEPHONE (OUTPATIENT)
Age: 74
End: 2024-07-31

## 2024-07-31 NOTE — TELEPHONE ENCOUNTER
Marilynn with [facility] is requesting a returned phone call to confirm that Dr. Bennett will follow care for this pt. Her contact number is [phone].    Thank you

## 2024-08-01 NOTE — TELEPHONE ENCOUNTER
Please the [facility] know that I will follow along for home health needs.     Future Appointments   Date Time Provider Department Center   8/6/2024  2:40 PM [facility]

## 2024-08-12 DIAGNOSIS — E11.21 TYPE 2 DIABETES MELLITUS WITH DIABETIC NEPHROPATHY, WITHOUT LONG-TERM CURRENT USE OF INSULIN (HCC): ICD-10-CM

## 2024-08-13 NOTE — TELEPHONE ENCOUNTER
Dur for diabetes follow-up.  Please call to schedule  
Followed up on refill request for METFORMIN 1000 MG.    Damien Bennett MD sent a new prescription for #200/2 refills to [facility] on 24.  is asking for diabetes follow-up.     Per PreCheck, refill is processed.     Letter sent.    Damien Bennett MD sent a new prescription for #200/2 refills to [facility] on 24.   This medication is filled and ready for you at:   [facility] [id number] [facility] [address] - [address] [phone]  Dr Bennett is requesting a diabetes follow-up appointment.     Please call the office to to be seen as soon as possible:  Damien Bennett MD  [address] [facility] [address] [phone]     Christelle Bettencourt [facility]  Population Health    [facility]   [phone] option 1     For Pharmacy Admin Tracking Only    Program: [facility] [employer]  CPA in place:  No  Recommendation Provided To: Provider: 1 via Note to Provider  Intervention Detail: Adherence Monitorin and New Rx: 1, reason: Improve Adherence  Intervention Accepted By: Provider: 1  Gap Closed?: No   Time Spent (min): 20       
have been identified:   Patient overdue refilling METFORMIN TAB 1000 MG and active on home medication list.   Patient needs current refills for METFORMIN TAB 1000 MG .   Patient eligible for 100 day supply    Outreach:  Provider:  Pending for a request for a current refill for METFORMIN TAB 1000MG and to change to 100 day supply. Send to [facility]    Last Visit: 04.22.24  Next Visit: none    Christelle Bettencourt CPhT  Population Health    [facility]   [phone] option 1

## 2024-08-20 ENCOUNTER — TELEPHONE (OUTPATIENT)
Age: 74
End: 2024-08-20

## 2024-08-20 DIAGNOSIS — M17.0 PRIMARY OSTEOARTHRITIS OF BOTH KNEES: ICD-10-CM

## 2024-08-20 DIAGNOSIS — M16.0 PRIMARY OSTEOARTHRITIS OF BOTH HIPS: ICD-10-CM

## 2024-08-20 DIAGNOSIS — M54.2 CERVICALGIA: ICD-10-CM

## 2024-08-20 DIAGNOSIS — S72.001D CLOSED FRACTURE OF NECK OF RIGHT FEMUR WITH ROUTINE HEALING: Primary | ICD-10-CM

## 2024-08-20 NOTE — PROGRESS NOTES
Reviewed and Signed Home Health Certification/Recertification and Care Plan (see attached)    Claudia Gorman  1950     Agency: [email or web address] Medicare #: [id number]  Medical Record #: [id number]  Provider #: --    SOC Date: 8/6/24  Certification Period: 8/6/24-10/4/24  Last F2F: 4/22/24.  Did not show to 8/6 office visit.  Needs to reschedule.    Services: PT/OT    Damien Bennett MD  8/20/2024 8:26 AM

## 2025-01-19 RX ORDER — AMLODIPINE BESYLATE 10 MG/1
10 TABLET ORAL DAILY
Qty: 90 TABLET | Refills: 3 | Status: SHIPPED | OUTPATIENT
Start: 2025-01-19

## 2025-01-23 DIAGNOSIS — E78.5 HYPERLIPIDEMIA, UNSPECIFIED HYPERLIPIDEMIA TYPE: ICD-10-CM

## 2025-01-23 DIAGNOSIS — G25.81 RESTLESS LEG: ICD-10-CM

## 2025-01-23 DIAGNOSIS — E11.21 TYPE 2 DIABETES WITH NEPHROPATHY (HCC): Primary | ICD-10-CM

## 2025-01-23 DIAGNOSIS — K90.89 OTHER INTESTINAL MALABSORPTION: ICD-10-CM

## 2025-01-23 RX ORDER — ATORVASTATIN CALCIUM 80 MG/1
80 TABLET, FILM COATED ORAL DAILY
Qty: 90 TABLET | Refills: 1 | Status: SHIPPED | OUTPATIENT
Start: 2025-01-23

## 2025-01-23 NOTE — TELEPHONE ENCOUNTER
Last Office Visit: 4/22/2024   Mrs. Claudia Gorman is due for an office visit.  It was recommended she follow-up in July 2024 for diabetes follow-up.  Also due for AWV.  A temporary prescription was provided.  They will need to be seen before additional medication can be provided. Please reach out to the patient to schedule an appointment.      Labs were ordered.  These may be drawn prior to their appointment.  I recommend these be drawn fasting (nothing to eat 8 hours prior to lab drawn, except water or black coffee).